# Patient Record
Sex: FEMALE | Race: BLACK OR AFRICAN AMERICAN | NOT HISPANIC OR LATINO | Employment: OTHER | URBAN - METROPOLITAN AREA
[De-identification: names, ages, dates, MRNs, and addresses within clinical notes are randomized per-mention and may not be internally consistent; named-entity substitution may affect disease eponyms.]

---

## 2017-01-03 ENCOUNTER — HOSPITAL ENCOUNTER (OUTPATIENT)
Dept: RADIOLOGY | Facility: CLINIC | Age: 67
Discharge: HOME | End: 2017-01-03
Attending: INTERNAL MEDICINE
Payer: MEDICARE

## 2017-01-03 DIAGNOSIS — E21.3 HYPERPARATHYROIDISM: ICD-10-CM

## 2017-01-03 PROCEDURE — 77080 DXA BONE DENSITY AXIAL: CPT | Mod: TC

## 2017-01-03 PROCEDURE — 77080 DXA BONE DENSITY AXIAL: CPT | Mod: 26,,, | Performed by: INTERNAL MEDICINE

## 2017-01-04 ENCOUNTER — OFFICE VISIT (OUTPATIENT)
Dept: ENDOCRINOLOGY | Facility: CLINIC | Age: 67
End: 2017-01-04
Payer: MEDICARE

## 2017-01-04 VITALS
WEIGHT: 230.38 LBS | HEIGHT: 67 IN | BODY MASS INDEX: 36.16 KG/M2 | HEART RATE: 79 BPM | SYSTOLIC BLOOD PRESSURE: 120 MMHG | DIASTOLIC BLOOD PRESSURE: 74 MMHG | RESPIRATION RATE: 16 BRPM

## 2017-01-04 DIAGNOSIS — E78.5 HYPERLIPIDEMIA, UNSPECIFIED HYPERLIPIDEMIA TYPE: ICD-10-CM

## 2017-01-04 DIAGNOSIS — E21.3 HYPERPARATHYROIDISM: ICD-10-CM

## 2017-01-04 DIAGNOSIS — E55.9 VITAMIN D DEFICIENCY: ICD-10-CM

## 2017-01-04 DIAGNOSIS — E66.9 OBESITY (BMI 30-39.9): ICD-10-CM

## 2017-01-04 PROCEDURE — 1159F MED LIST DOCD IN RCRD: CPT | Mod: GC,S$GLB,, | Performed by: INTERNAL MEDICINE

## 2017-01-04 PROCEDURE — 1160F RVW MEDS BY RX/DR IN RCRD: CPT | Mod: GC,S$GLB,, | Performed by: INTERNAL MEDICINE

## 2017-01-04 PROCEDURE — 4010F ACE/ARB THERAPY RXD/TAKEN: CPT | Mod: GC,S$GLB,, | Performed by: INTERNAL MEDICINE

## 2017-01-04 PROCEDURE — 3078F DIAST BP <80 MM HG: CPT | Mod: GC,S$GLB,, | Performed by: INTERNAL MEDICINE

## 2017-01-04 PROCEDURE — 3044F HG A1C LEVEL LT 7.0%: CPT | Mod: GC,S$GLB,, | Performed by: INTERNAL MEDICINE

## 2017-01-04 PROCEDURE — 3074F SYST BP LT 130 MM HG: CPT | Mod: GC,S$GLB,, | Performed by: INTERNAL MEDICINE

## 2017-01-04 PROCEDURE — 1157F ADVNC CARE PLAN IN RCRD: CPT | Mod: GC,S$GLB,, | Performed by: INTERNAL MEDICINE

## 2017-01-04 PROCEDURE — 1126F AMNT PAIN NOTED NONE PRSNT: CPT | Mod: GC,S$GLB,, | Performed by: INTERNAL MEDICINE

## 2017-01-04 PROCEDURE — 99214 OFFICE O/P EST MOD 30 MIN: CPT | Mod: GC,S$GLB,, | Performed by: INTERNAL MEDICINE

## 2017-01-04 PROCEDURE — 99999 PR PBB SHADOW E&M-EST. PATIENT-LVL III: CPT | Mod: PBBFAC,GC,, | Performed by: INTERNAL MEDICINE

## 2017-01-04 RX ORDER — METFORMIN HYDROCHLORIDE 500 MG/1
500 TABLET, EXTENDED RELEASE ORAL
Qty: 30 TABLET | Refills: 11 | Status: SHIPPED | OUTPATIENT
Start: 2017-01-04 | End: 2017-06-16 | Stop reason: SDUPTHER

## 2017-01-04 NOTE — MR AVS SNAPSHOT
[facility] - Endo/Diab/Metab  [address]  [place] [address]  Phone: [phone]  Fax: [phone]                  Maral Linn   2017 11:00 AM   Office Visit    Description:  Female : 1950   Provider:  Velasquez Lopez MD   Department:  [facility] - Endo/Diab/Metab           Reason for Visit     Hyperparathyroidism           Diagnoses this Visit        Comments    Type II or unspecified type diabetes mellitus without mention of complication, uncontrolled    -  Primary     Obesity (BMI 30-39.9)         Hyperlipidemia, unspecified hyperlipidemia type         Vitamin D deficiency         Hyperparathyroidism                To Do List           Future Appointments        Provider Department Dept Phone    2017 2:10 PM LAB, APPOINTMENT [place] [facility] [phone]    2017 10:45 AM Rosemary Pearson MD [facility] - Internal Medicine [phone]      Goals (5 Years of Data)     None      Follow-Up and Disposition     Return in about 1 year (around 2018).       These Medications        Disp Refills Start End    metformin (GLUCOPHAGE-XR) 500 MG 24 hr tablet 30 tablet 11 2017 2/3/2017    Take 1 tablet (500 mg total) by mouth daily with breakfast. - Oral    Pharmacy: [facility] [address] [place] [address] [facility] [address] Ph #: [phone]         [facility]     [facility] -  Assistance  Registered nurses in the [facility] provide clinical advisement, health education, appointment booking, and other advisory services.  Call for this free service at [phone].             Medications           Message regarding Medications     Verify the changes and/or additions to your medication regime listed below are the same as discussed with your clinician today.  If any of these changes or additions are incorrect, please notify your healthcare provider.        START taking  "these NEW medications        Refills    metformin (GLUCOPHAGE-XR) 500 MG 24 hr tablet 11    Sig: Take 1 tablet (500 mg total) by mouth daily with breakfast.    Class: Normal    Route: Oral      STOP taking these medications     metformin (GLUCOPHAGE) 500 MG tablet Take 1 tablet (500 mg total) by mouth 2 (two) times daily with meals.           Verify that the below list of medications is an accurate representation of the medications you are currently taking.  If none reported, the list may be blank. If incorrect, please contact your healthcare provider. Carry this list with you in case of emergency.           Current Medications     atenolol (TENORMIN) 25 MG tablet Take 1 tablet (25 mg total) by mouth once daily.    blood sugar diagnostic Strp 1 each by Misc.(Non-Drug; Combo Route) route once daily.    blood-glucose meter kit Use as instructed    doxazosin (CARDURA) 8 MG Tab TAKE 1 TABLET(8 MG) BY MOUTH EVERY EVENING    felodipine (PLENDIL) 10 MG 24 hr tablet TAKE 1 TABLET(10 MG) BY MOUTH EVERY DAY    fluticasone (FLONASE) 50 mcg/actuation nasal spray 2 Spray, Suspension Nasal Every day    lancets 33 gauge Misc 1 lancet by Misc.(Non-Drug; Combo Route) route once daily.    losartan (COZAAR) 100 MG tablet Take 1 tablet (100 mg total) by mouth once daily.    metformin (GLUCOPHAGE-XR) 500 MG 24 hr tablet Take 1 tablet (500 mg total) by mouth daily with breakfast.           Clinical Reference Information           Vital Signs - Last Recorded  Most recent update: 1/4/2017  1:11 PM by Carli Polo MA    BP Pulse Resp Ht Wt BMI    120/74 (BP Location: Left arm, Patient Position: Sitting, BP Method: Manual) 79 16 5' 7" (1.702 m) 104.5 kg (230 lb 6.1 oz) 36.08 kg/m2      Blood Pressure          Most Recent Value    BP  120/74      Allergies as of 1/4/2017     No Known Allergies      Immunizations Administered on Date of Encounter - 1/4/2017     None      Orders Placed During Today's Visit     Future Labs/Procedures " Expected by Expires    Comprehensive metabolic panel  1/4/2017 4/4/2017    PTH, intact  1/4/2017 4/4/2017    Vitamin D  1/4/2017 4/4/2017

## 2017-01-04 NOTE — PROGRESS NOTES
"Subjective:       Patient ID: Maral Linn is a 66 y.o. female.    Chief Complaint: Hyperparathyroidism    HPI     Patient is here for follow up for hypercalcemia     Initially seen by Endocrinology in 2013 for Ca level of 10.6  PTH of 569  2011 Sestamibi scan "the most likely location of a parathyroid adenoma would be the mid posterior level of the   left thyroid lobe."  2014 Head and neck U/S "Left lobe lesion could be a parathyroid adenoma"  2014 Dexa scan: Lumbar bone mineral density t-score of 1.2, Femoral neck BMD t-score is -0.5.     No history of nephrolithiasis  -Ankle fracture 2006        Also had concurrent Vitamin D deficiency of 15  -Prescribed Ergocalciferol and takes 1000 IU daily            New Diagnosis of Type 2 DM 9/2016  +Fm Hx sister, brother  -BMI: 37   -Not on therapy  -Checks daily at home randomly  -AM blood glucose values 120-178  +polyuria, +polydispia    Urine Ca/Cr ratio:  0.013    Results for MARAL LINN (MRN [id number]) as of 1/9/2017 00:18   Ref. Range 10/14/2016 08:53   Calcium, Urine Latest Ref Range: 0.0 - 15.0 mg/dL 14.5   Calcium, 24H Urine Latest Ref Range: 4 - 12 mg/Hr 10   CA Urine (mg/Spec) Latest Units: mg/Spec 236   Creatinine, Timed Urine Latest Ref Range: 40.0 - 75.0 mg/Hr 56.4   Creatinine, Ur (mg/spec) Latest Units: mg/Spec 1352.9         Review of Systems   Constitutional: Negative for unexpected weight change.   Eyes: Negative for visual disturbance.   Respiratory: Negative for shortness of breath.    Cardiovascular: Negative for chest pain.   Gastrointestinal: Negative for abdominal pain.   Musculoskeletal: Negative for arthralgias.   Skin: Negative for wound.   Neurological: Negative for headaches.   Hematological: Does not bruise/bleed easily.   Psychiatric/Behavioral: Negative for sleep disturbance.       Objective:      Physical Exam   Neck: No thyromegaly present.   Cardiovascular: Normal rate.    Pulmonary/Chest: Effort normal.   Abdominal: Soft. "   Musculoskeletal: She exhibits no edema.        Right foot: There is no deformity.        Left foot: There is no deformity.   Feet:   Right Foot:   Skin Integrity: Negative for ulcer.   Left Foot:   Skin Integrity: Negative for ulcer.   Skin:         Feet   Shoes appropriate  sensation intact to vibration and monofilament     Vitals reviewed.      Assessment:       Maral was seen today for hyperparathyroidism.    Diagnoses and all orders for this visit:    Type II or unspecified type diabetes mellitus without mention of complication, uncontrolled    Obesity (BMI 30-39.9)    Hyperlipidemia, unspecified hyperlipidemia type    Vitamin D deficiency    Hyperparathyroidism  -     Vitamin D; Future  -     Comprehensive metabolic panel; Future  -     PTH, intact; Future    Other orders  -     metformin (GLUCOPHAGE-XR) 500 MG 24 hr tablet; Take 1 tablet (500 mg total) by mouth daily with breakfast.      Plan:       Hyperparathyroidism  -Primary hyperparathyroidism with markedly elevated PTH with elevated Ca and urine calcium/cr ratio of 0.013  -Has concurrent Vitamin D deficiency which maybe contributing to higher value of PTH   -But prior imaging localizes what appears to be parathyroid adenoma left lobe  -Patient currently does not meet criteria for surgical resection for asymptomatic primary hyperparathyroidism  -Will follow patient with serial CMP to monitor for hypercalcemia     Thyroid nodule  -sub cm thyroid nodule not meeting criteria for bx  -multiple small cyst  -monitor clinically for growth and symptoms and if change in characteristics will repeat U/S     Type 2 DM  -a1c 6.8 indicates good control of diabetes  -Change to glucophage xr 500 mg daily for patient tolerance   -needs wt loss of 5%-8%        Vitamin D deficiency  -repeat Vit D level  -may require additional seven weeks of ergocalciferol  -Cont. Taking 2000 IU of otc      A. Ghazala Lopez MD  PGY 5  Endocrinology

## 2017-01-05 ENCOUNTER — TELEPHONE (OUTPATIENT)
Dept: ENDOCRINOLOGY | Facility: CLINIC | Age: 67
End: 2017-01-05

## 2017-01-05 DIAGNOSIS — E55.9 VITAMIN D DEFICIENCY: Primary | ICD-10-CM

## 2017-01-05 RX ORDER — ERGOCALCIFEROL 1.25 MG/1
50000 CAPSULE ORAL
Qty: 7 CAPSULE | Refills: 0 | Status: SHIPPED | OUTPATIENT
Start: 2017-01-05 | End: 2017-02-04

## 2017-01-05 NOTE — TELEPHONE ENCOUNTER
Vitamin D insufficiency.  Will order Ergocalciferol 50k weekly.    NEERAJ Lopez MD  PGY5  Endocrinology

## 2017-01-09 NOTE — PROGRESS NOTES
I  Nemesio March have personally taken the history and examined this patient and agree with the fellow's note.

## 2017-03-10 DIAGNOSIS — I10 ESSENTIAL HYPERTENSION: ICD-10-CM

## 2017-03-10 RX ORDER — ATENOLOL 25 MG/1
TABLET ORAL
Qty: 30 TABLET | Refills: 0 | Status: SHIPPED | OUTPATIENT
Start: 2017-03-10 | End: 2017-03-10 | Stop reason: SDUPTHER

## 2017-03-10 RX ORDER — LOSARTAN POTASSIUM 100 MG/1
TABLET ORAL
Qty: 30 TABLET | Refills: 0 | Status: SHIPPED | OUTPATIENT
Start: 2017-03-10 | End: 2017-03-10 | Stop reason: SDUPTHER

## 2017-03-11 ENCOUNTER — NURSE TRIAGE (OUTPATIENT)
Dept: ADMINISTRATIVE | Facility: CLINIC | Age: 67
End: 2017-03-11

## 2017-03-11 NOTE — TELEPHONE ENCOUNTER
Reason for Disposition   Message left on unidentified answering machine.  Answering service notified.    Protocols used: ST NO CONTACT OR DUPLICATE CONTACT CALL-A-AH

## 2017-03-13 RX ORDER — ATENOLOL 25 MG/1
TABLET ORAL
Qty: 90 TABLET | Refills: 0 | Status: SHIPPED | OUTPATIENT
Start: 2017-03-13 | End: 2017-06-16 | Stop reason: SDUPTHER

## 2017-03-13 RX ORDER — LOSARTAN POTASSIUM 100 MG/1
TABLET ORAL
Qty: 90 TABLET | Refills: 0 | Status: SHIPPED | OUTPATIENT
Start: 2017-03-13 | End: 2017-06-16 | Stop reason: SDUPTHER

## 2017-04-06 ENCOUNTER — TELEPHONE (OUTPATIENT)
Dept: INTERNAL MEDICINE | Facility: CLINIC | Age: 67
End: 2017-04-06

## 2017-05-01 DIAGNOSIS — E11.9 TYPE 2 DIABETES MELLITUS WITHOUT COMPLICATION: ICD-10-CM

## 2017-06-15 RX ORDER — FELODIPINE 10 MG/1
TABLET, EXTENDED RELEASE ORAL
Qty: 30 TABLET | Refills: 0 | Status: SHIPPED | OUTPATIENT
Start: 2017-06-15 | End: 2017-06-15 | Stop reason: SDUPTHER

## 2017-06-15 RX ORDER — FELODIPINE 10 MG/1
TABLET, EXTENDED RELEASE ORAL
Qty: 90 TABLET | Refills: 0 | Status: SHIPPED | OUTPATIENT
Start: 2017-06-15 | End: 2017-06-16 | Stop reason: SDUPTHER

## 2017-06-16 ENCOUNTER — OFFICE VISIT (OUTPATIENT)
Dept: INTERNAL MEDICINE | Facility: CLINIC | Age: 67
End: 2017-06-16
Payer: MEDICARE

## 2017-06-16 VITALS
WEIGHT: 233 LBS | TEMPERATURE: 99 F | DIASTOLIC BLOOD PRESSURE: 80 MMHG | OXYGEN SATURATION: 95 % | HEIGHT: 67 IN | SYSTOLIC BLOOD PRESSURE: 128 MMHG | BODY MASS INDEX: 36.57 KG/M2 | HEART RATE: 94 BPM

## 2017-06-16 DIAGNOSIS — E11.9 TYPE 2 DIABETES MELLITUS WITHOUT COMPLICATION, WITHOUT LONG-TERM CURRENT USE OF INSULIN: ICD-10-CM

## 2017-06-16 DIAGNOSIS — E21.3 HYPERPARATHYROIDISM: ICD-10-CM

## 2017-06-16 DIAGNOSIS — Z12.31 ENCOUNTER FOR SCREENING MAMMOGRAM FOR BREAST CANCER: Primary | ICD-10-CM

## 2017-06-16 DIAGNOSIS — I10 ESSENTIAL HYPERTENSION: ICD-10-CM

## 2017-06-16 PROCEDURE — 4010F ACE/ARB THERAPY RXD/TAKEN: CPT | Mod: S$GLB,,, | Performed by: INTERNAL MEDICINE

## 2017-06-16 PROCEDURE — 99499 UNLISTED E&M SERVICE: CPT | Mod: S$GLB,,, | Performed by: INTERNAL MEDICINE

## 2017-06-16 PROCEDURE — 3044F HG A1C LEVEL LT 7.0%: CPT | Mod: S$GLB,,, | Performed by: INTERNAL MEDICINE

## 2017-06-16 PROCEDURE — 1126F AMNT PAIN NOTED NONE PRSNT: CPT | Mod: S$GLB,,, | Performed by: INTERNAL MEDICINE

## 2017-06-16 PROCEDURE — 99999 PR PBB SHADOW E&M-EST. PATIENT-LVL IV: CPT | Mod: PBBFAC,,, | Performed by: INTERNAL MEDICINE

## 2017-06-16 PROCEDURE — 99214 OFFICE O/P EST MOD 30 MIN: CPT | Mod: S$GLB,,, | Performed by: INTERNAL MEDICINE

## 2017-06-16 PROCEDURE — 1159F MED LIST DOCD IN RCRD: CPT | Mod: S$GLB,,, | Performed by: INTERNAL MEDICINE

## 2017-06-16 RX ORDER — METFORMIN HYDROCHLORIDE 500 MG/1
500 TABLET, EXTENDED RELEASE ORAL
Qty: 30 TABLET | Refills: 3 | Status: SHIPPED | OUTPATIENT
Start: 2017-06-16 | End: 2017-07-16

## 2017-06-16 RX ORDER — LOSARTAN POTASSIUM 100 MG/1
TABLET ORAL
Qty: 90 TABLET | Refills: 1 | Status: SHIPPED | OUTPATIENT
Start: 2017-06-16 | End: 2018-01-22 | Stop reason: SDUPTHER

## 2017-06-16 RX ORDER — DOXAZOSIN 8 MG/1
TABLET ORAL
Qty: 30 TABLET | Refills: 3 | Status: SHIPPED | OUTPATIENT
Start: 2017-06-16 | End: 2018-03-21 | Stop reason: SDUPTHER

## 2017-06-16 RX ORDER — FELODIPINE 10 MG/1
10 TABLET, EXTENDED RELEASE ORAL DAILY
Qty: 90 TABLET | Refills: 1 | Status: SHIPPED | OUTPATIENT
Start: 2017-06-16 | End: 2018-03-21 | Stop reason: SDUPTHER

## 2017-06-16 RX ORDER — ATENOLOL 25 MG/1
TABLET ORAL
Qty: 90 TABLET | Refills: 0 | Status: SHIPPED | OUTPATIENT
Start: 2017-06-16 | End: 2017-10-06 | Stop reason: SDUPTHER

## 2017-06-20 NOTE — PROGRESS NOTES
Subjective:       Patient ID: Maral Linn is a 67 y.o. female.    Chief Complaint: Hypertension    HPI: She returns for management of hypertension.  She has had hypertension for over a year.  Current treatment has included medications outlined in medication list.  She denies chest pain or shortness of breath.  No palpitations.  Denies left arm or neck pain.  She has diabetes.  Denies polyuria, polydipsia    Medications: See med card    Social history: Does not smoke, does not drink alcohol      Review of Systems   Constitutional: Negative for chills, fatigue, fever and unexpected weight change.   Respiratory: Negative for chest tightness and shortness of breath.    Cardiovascular: Negative for chest pain and palpitations.   Gastrointestinal: Negative for abdominal pain and blood in stool.   Neurological: Negative for dizziness, syncope, numbness and headaches.       Objective:      Physical Exam   HENT:   Right Ear: External ear normal.   Left Ear: External ear normal.   Nose: Nose normal.   Mouth/Throat: Oropharynx is clear and moist.   Eyes: Pupils are equal, round, and reactive to light.   Neck: Normal range of motion.   Cardiovascular: Normal rate and regular rhythm.    No murmur heard.  Pulmonary/Chest: Breath sounds normal.   Abdominal: She exhibits no distension. There is no hepatosplenomegaly. There is no tenderness.   Lymphadenopathy:     She has no cervical adenopathy.     She has no axillary adenopathy.   Neurological: She has normal strength and normal reflexes. No cranial nerve deficit or sensory deficit.     breast exam: No mass palpated, no nipple discharge expressed  Assessment:     assessment and plan: 1.  Hypertension: Check CMP and lipid panel  2.  Diabetes: Check glycosylated hemoglobin and urine microalbumin  3.  Schedule mammogram.  Check B12 and PTH      Plan:     as above

## 2017-06-23 ENCOUNTER — HOSPITAL ENCOUNTER (OUTPATIENT)
Dept: RADIOLOGY | Facility: HOSPITAL | Age: 67
Discharge: HOME | End: 2017-06-23
Attending: INTERNAL MEDICINE
Payer: MEDICARE

## 2017-06-23 VITALS — HEIGHT: 67 IN | WEIGHT: 233 LBS | BODY MASS INDEX: 36.57 KG/M2

## 2017-06-23 DIAGNOSIS — Z12.31 ENCOUNTER FOR SCREENING MAMMOGRAM FOR BREAST CANCER: ICD-10-CM

## 2017-06-23 PROCEDURE — 77067 SCR MAMMO BI INCL CAD: CPT | Mod: TC

## 2017-06-23 PROCEDURE — 77067 SCR MAMMO BI INCL CAD: CPT | Mod: 26,,, | Performed by: RADIOLOGY

## 2017-07-01 ENCOUNTER — TELEPHONE (OUTPATIENT)
Dept: INTERNAL MEDICINE | Facility: CLINIC | Age: 67
End: 2017-07-01

## 2017-07-01 NOTE — TELEPHONE ENCOUNTER
Have attempted to contact patient by phone numerous times about her lab result- no answer.Will mail letter asking her to contact our ofice

## 2017-08-12 ENCOUNTER — OFFICE VISIT (OUTPATIENT)
Dept: URGENT CARE | Facility: CLINIC | Age: 67
End: 2017-08-12
Payer: MEDICARE

## 2017-08-12 VITALS
OXYGEN SATURATION: 98 % | WEIGHT: 237.19 LBS | HEIGHT: 67 IN | HEART RATE: 85 BPM | SYSTOLIC BLOOD PRESSURE: 166 MMHG | BODY MASS INDEX: 37.23 KG/M2 | TEMPERATURE: 98 F | DIASTOLIC BLOOD PRESSURE: 98 MMHG

## 2017-08-12 DIAGNOSIS — S65.212A LACERATION OF SUPERFICIAL PALMAR ARCH OF LEFT HAND, INITIAL ENCOUNTER: Primary | ICD-10-CM

## 2017-08-12 DIAGNOSIS — T36.95XA ANTIBIOTIC-INDUCED YEAST INFECTION: ICD-10-CM

## 2017-08-12 DIAGNOSIS — B37.9 ANTIBIOTIC-INDUCED YEAST INFECTION: ICD-10-CM

## 2017-08-12 PROCEDURE — 90471 IMMUNIZATION ADMIN: CPT | Mod: S$GLB,,, | Performed by: NURSE PRACTITIONER

## 2017-08-12 PROCEDURE — 3080F DIAST BP >= 90 MM HG: CPT | Mod: S$GLB,,, | Performed by: NURSE PRACTITIONER

## 2017-08-12 PROCEDURE — 90715 TDAP VACCINE 7 YRS/> IM: CPT | Mod: S$GLB,,, | Performed by: NURSE PRACTITIONER

## 2017-08-12 PROCEDURE — 1125F AMNT PAIN NOTED PAIN PRSNT: CPT | Mod: S$GLB,,, | Performed by: NURSE PRACTITIONER

## 2017-08-12 PROCEDURE — 99214 OFFICE O/P EST MOD 30 MIN: CPT | Mod: 25,S$GLB,, | Performed by: NURSE PRACTITIONER

## 2017-08-12 PROCEDURE — 1159F MED LIST DOCD IN RCRD: CPT | Mod: S$GLB,,, | Performed by: NURSE PRACTITIONER

## 2017-08-12 PROCEDURE — 3008F BODY MASS INDEX DOCD: CPT | Mod: S$GLB,,, | Performed by: NURSE PRACTITIONER

## 2017-08-12 PROCEDURE — 3077F SYST BP >= 140 MM HG: CPT | Mod: S$GLB,,, | Performed by: NURSE PRACTITIONER

## 2017-08-12 PROCEDURE — 99999 PR PBB SHADOW E&M-EST. PATIENT-LVL IV: CPT | Mod: PBBFAC,,, | Performed by: NURSE PRACTITIONER

## 2017-08-12 RX ORDER — FLUCONAZOLE 150 MG/1
150 TABLET ORAL ONCE
Qty: 2 TABLET | Refills: 0 | Status: SHIPPED | OUTPATIENT
Start: 2017-08-12 | End: 2017-08-12

## 2017-08-12 RX ORDER — CEPHALEXIN 500 MG/1
500 CAPSULE ORAL EVERY 12 HOURS
Qty: 14 CAPSULE | Refills: 0 | Status: SHIPPED | OUTPATIENT
Start: 2017-08-12 | End: 2017-08-17

## 2017-08-12 RX ORDER — MUPIROCIN 20 MG/G
OINTMENT TOPICAL 3 TIMES DAILY
Qty: 1 TUBE | Refills: 0 | Status: SHIPPED | OUTPATIENT
Start: 2017-08-12 | End: 2017-08-17

## 2017-08-12 NOTE — PROGRESS NOTES
Administered Tdap  IM to pt left deltoid. Pt tolerated well. No distress noted. Advised pt to wait 20 minutes in lobby and to inform  if any adverse reaction occurs. Pt stated understanding.

## 2017-08-12 NOTE — PROGRESS NOTES
"Subjective:       Patient ID: Maral Linn is a 67 y.o. female.    Chief Complaint: Laceration    HPI  Ms. Linn presents after a cut to her left hand last night. She cut the hand with a kitchen knife. She is concerned because she is diabetic and when she woke up this AM she noticed that it was swollen.   BP (!) 166/98 (BP Location: Right arm, Patient Position: Sitting, BP Method: Medium (Automatic))   Pulse 85   Temp 97.5 °F (36.4 °C) (Tympanic)   Ht 5' 7" (1.702 m)   Wt 107.6 kg (237 lb 3.4 oz)   SpO2 98%   BMI 37.15 kg/m²     Review of Systems   Constitutional: Negative for activity change, appetite change, chills, diaphoresis and fever.   HENT: Negative for sore throat.    Respiratory: Negative for cough, chest tightness and shortness of breath.    Cardiovascular: Negative for chest pain.   Gastrointestinal: Negative for abdominal distention, abdominal pain, diarrhea, nausea and vomiting.   Musculoskeletal: Negative for myalgias.   Skin: Positive for wound. Negative for rash.   Allergic/Immunologic: Negative for immunocompromised state.   Hematological: Does not bruise/bleed easily.   Psychiatric/Behavioral: Negative for behavioral problems and confusion.       Objective:      Physical Exam   Constitutional: She is oriented to person, place, and time. She appears well-developed and well-nourished. No distress.   Eyes: Conjunctivae and EOM are normal. Pupils are equal, round, and reactive to light.   Cardiovascular: Normal rate.    Pulmonary/Chest: Effort normal. No respiratory distress.   Musculoskeletal: Normal range of motion. She exhibits no edema or deformity.   Lymphadenopathy:     She has no cervical adenopathy.   Neurological: She is alert and oriented to person, place, and time.   Skin: Skin is warm and dry. No rash noted. She is not diaphoretic.   There is a 1 cm laceration to the lateral aspect of the left palm near the fifth finger. The area is swollen. There is no drainage. There is mild " erythema surrounding the wound. The area is well approximated and appears to be healing   Psychiatric: She has a normal mood and affect. Her behavior is normal.   Vitals reviewed.      Assessment:       1. Laceration of superficial palmar arch of left hand, initial encounter    2. Antibiotic-induced yeast infection        Plan:       Maral was seen today for laceration.    Diagnoses and all orders for this visit:    Laceration of superficial palmar arch of left hand, initial encounter  -     Tdap Vaccine  -     cephALEXin (KEFLEX) 500 MG capsule; Take 1 capsule (500 mg total) by mouth every 12 (twelve) hours.  -     mupirocin (BACTROBAN) 2 % ointment; Apply topically 3 (three) times daily.    Antibiotic-induced yeast infection  -     fluconazole (DIFLUCAN) 150 MG Tab; Take 1 tablet (150 mg total) by mouth once. Repeat in 3 days if symptoms still persist.    If symptoms worsen or fail to improve, follow-up with primary care doctor or nearest ER. After visit summary given and discussed. Patient verbalized understanding and agrees with treatment plan. Patient remained stable and was discharged in no acute distress.

## 2017-10-06 RX ORDER — ATENOLOL 25 MG/1
TABLET ORAL
Qty: 90 TABLET | Refills: 0 | Status: SHIPPED | OUTPATIENT
Start: 2017-10-06 | End: 2018-03-21 | Stop reason: SDUPTHER

## 2018-01-05 ENCOUNTER — OFFICE VISIT (OUTPATIENT)
Dept: ENDOCRINOLOGY | Facility: CLINIC | Age: 68
End: 2018-01-05
Payer: MEDICARE

## 2018-01-05 VITALS
HEIGHT: 67 IN | DIASTOLIC BLOOD PRESSURE: 80 MMHG | HEART RATE: 90 BPM | WEIGHT: 245.56 LBS | SYSTOLIC BLOOD PRESSURE: 128 MMHG | BODY MASS INDEX: 38.54 KG/M2

## 2018-01-05 DIAGNOSIS — E21.3 HYPERPARATHYROIDISM: Primary | ICD-10-CM

## 2018-01-05 DIAGNOSIS — E55.9 VITAMIN D DEFICIENCY: ICD-10-CM

## 2018-01-05 DIAGNOSIS — E11.9 CONTROLLED TYPE 2 DIABETES MELLITUS WITHOUT COMPLICATION, WITHOUT LONG-TERM CURRENT USE OF INSULIN: ICD-10-CM

## 2018-01-05 DIAGNOSIS — E04.1 THYROID NODULE: ICD-10-CM

## 2018-01-05 LAB
CREAT UR-MCNC: 73 MG/DL
MICROALBUMIN UR DL<=1MG/L-MCNC: 9 UG/ML
MICROALBUMIN/CREATININE RATIO: 12.3 UG/MG

## 2018-01-05 PROCEDURE — 82043 UR ALBUMIN QUANTITATIVE: CPT

## 2018-01-05 PROCEDURE — 99214 OFFICE O/P EST MOD 30 MIN: CPT | Mod: S$GLB,,, | Performed by: INTERNAL MEDICINE

## 2018-01-05 PROCEDURE — 99499 UNLISTED E&M SERVICE: CPT | Mod: S$GLB,,, | Performed by: INTERNAL MEDICINE

## 2018-01-05 PROCEDURE — 99999 PR PBB SHADOW E&M-EST. PATIENT-LVL III: CPT | Mod: PBBFAC,,, | Performed by: INTERNAL MEDICINE

## 2018-01-05 NOTE — PROGRESS NOTES
"Subjective:      Patient ID: Maral Linn is a 67 y.o. female.    Chief Complaint:  Diabetes and Hyperparathyroidism      History of Present Illness  Patient is here for follow up primary HPT and diabetes. Last seen by Andrea Salvador in 1/2017.     Initially seen by Endocrinology in 2013 for Ca level of 10.6  PTH as high as 569  2011 Sestamibi scan "the most likely location of a parathyroid adenoma would be the mid posterior level of the   left thyroid lobe."  2014 Head and neck U/S "Left lobe lesion could be a parathyroid adenoma"  2016 sestimibi and neck U/S - no parathyroid adenoma identified  Last bone density 1/2017 was normal.      No history of nephrolithiasis  -Ankle fracture 2006, no history of fragility fracture.    No hx of lithium. Not on thiazide diuretic.     Has sister that had hyperparathyroidism and had parathyroidectomy. Sister has CKD, so unsure if sister had primary or tertiary HPT.     No polyuria. Nocturia x 2. No polydipsia.    No CKD. Urine Ca/Cr ratio: 0.013     Also had concurrent Vitamin D deficiency of 15  -Prescribed Ergocalciferol and takes 1000 IU daily      New Diagnosis of Type 2 DM 9/2016  +Fm Hx sister, brother  -BMI: 38  -Not on therapy  Taking metformin 500 mg PO BID  AM: 100-130     Review of Systems   Constitutional: Negative for chills and fever.   Gastrointestinal: Negative for nausea.   No CP   No SOB    Objective:   Physical Exam   Nursing note and vitals reviewed.  No thyromegaly  DTR's 2 +  No tremor  No edema  Feet no cuts or  scratches  Shoes appropriate  sensation intact to vibration and monofilament    Lab Review:   Results for MARAL LINN (MRN [id number]) as of 1/5/2018 07:38   Ref. Range 6/23/2017 08:55   Sodium Latest Ref Range: 136 - 145 mmol/L 139   Potassium Latest Ref Range: 3.5 - 5.1 mmol/L 3.6   Chloride Latest Ref Range: 95 - 110 mmol/L 106   CO2 Latest Ref Range: 23 - 29 mmol/L 27   Anion Gap Latest Ref Range: 8 - 16 mmol/L 6 (L)   BUN, Bld " Latest Ref Range: 8 - 23 mg/dL 16   Creatinine Latest Ref Range: 0.5 - 1.4 mg/dL 1.0   eGFR if non African American Latest Ref Range: >60 mL/min/1.73 m^2 58.0 (A)   eGFR if African American Latest Ref Range: >60 mL/min/1.73 m^2 >60.0   Glucose Latest Ref Range: 70 - 110 mg/dL 120 (H)   Calcium Latest Ref Range: 8.7 - 10.5 mg/dL 11.0 (H)   Alkaline Phosphatase Latest Ref Range: 55 - 135 U/L 101   Total Protein Latest Ref Range: 6.0 - 8.4 g/dL 7.1   Albumin Latest Ref Range: 3.5 - 5.2 g/dL 3.5   Total Bilirubin Latest Ref Range: 0.1 - 1.0 mg/dL 0.4   AST Latest Ref Range: 10 - 40 U/L 15   ALT Latest Ref Range: 10 - 44 U/L 18   Triglycerides Latest Ref Range: 30 - 150 mg/dL 146   Cholesterol Latest Ref Range: 120 - 199 mg/dL 186   HDL Latest Ref Range: 40 - 75 mg/dL 37 (L)   LDL Cholesterol Latest Ref Range: 63.0 - 159.0 mg/dL 119.8   Total Cholesterol/HDL Ratio Latest Ref Range: 2.0 - 5.0  5.0   Vitamin B-12 Latest Ref Range: 210 - 950 pg/mL 918   Hemoglobin A1C Latest Ref Range: 4.0 - 5.6 % 6.6 (H)   Estimated Avg Glucose Latest Ref Range: 68 - 131 mg/dL 143 (H)   PTH Latest Ref Range: 9.0 - 77.0 pg/mL 374.0 (H)     Results for ALAN VARGAS (MRN [id number]) as of 1/5/2018 07:38   Ref. Range 10/14/2016 08:53   Calcium, Urine Latest Ref Range: 0.0 - 15.0 mg/dL 14.5   Calcium, 24H Urine Latest Ref Range: 4 - 12 mg/Hr 10   CA Urine (mg/Spec) Latest Units: mg/Spec 236   Creatinine, Ur (mg/spec) Latest Units: mg/Spec 1352.9       BONE MINERAL DENSITY RESULTS 1/3/2017:  Lumbar Spine: Lumbar bone mineral density L1-L4 is 1.359g/cm2, which is a t-score of 2.8. The z-score is 4.1.    Total Hip: The total hip bone mineral density is 1.031g/cm2.  The t-score is 0.7, and the z-score is 1.1.  Femoral neck BMD is 0.852g/cm2 and the t-score is 0.0.    Lt Forearm: Lt Forearm bone mineral density is 0.628g/cm2, which is a t-score of 0.9 and the z-score is 2.7.      COMPARISONS:  Date Location BMD T-score  06/05/14 L-spine 1.286  2.2  Total Hip 1.020 0.6    Assessment:     1. Hyperparathyroidism    2. Thyroid nodule    3. Controlled type 2 diabetes mellitus without complication, without long-term current use of insulin    4. Vitamin D deficiency        Plan:     Hyperparathyroidism  -Primary hyperparathyroidism with markedly elevated PTH with elevated Ca and urine calcium/cr ratio of 0.013  -Cautiously replacing Vit D given hypercalcemia  -Decision made previously to monitor as not obvious surgical indications (calcium has been near 11-11.5 mg/dL when corrected for albumin which would be a surgical indication)  -Previously localized in 2011, but recent imaging in 2016 did not localize  -Repeat PTH and calcium today  -Encouraged adequate hydration     Thyroid nodule  -sub cm thyroid nodule not meeting criteria for bx  -Will repeat ultrasound in 1 year     Type 2 DM  -A1c at goal  -Continue metformin 500 mg PO BID  -Due for eye exam and will make appt  -Urine micro today      Vitamin D deficiency  -Will check Vit D today and cautiously replace so as to not worsen hypercalcemia    Lon Reyes M.D. Staff Endocrinology

## 2018-01-05 NOTE — PROGRESS NOTES
Patient, Maral Linn (MRN #[id number]), presented with a recorded BMI of 38.47 kg/m^2 and a documented comorbidity(s):  - Diabetes Mellitus Type 2  to which the severe obesity is a contributing factor. This is consistent with the definition of severe obesity (BMI 35.0-35.9) with comorbidity (ICD-10 E66.01, Z68.35). The patient's severe obesity was monitored, evaluated, addressed and/or treated. This addendum to the medical record is made on 01/05/2018.

## 2018-01-08 ENCOUNTER — PATIENT MESSAGE (OUTPATIENT)
Dept: ENDOCRINOLOGY | Facility: CLINIC | Age: 68
End: 2018-01-08

## 2018-01-08 DIAGNOSIS — E21.3 HYPERPARATHYROIDISM: Primary | ICD-10-CM

## 2018-01-08 DIAGNOSIS — E83.39 HYPOPHOSPHATEMIA: ICD-10-CM

## 2018-01-08 RX ORDER — VIT C/E/ZN/COPPR/LUTEIN/ZEAXAN 250MG-90MG
1000 CAPSULE ORAL DAILY
Refills: 0 | COMMUNITY
Start: 2018-01-08

## 2018-01-19 ENCOUNTER — OFFICE VISIT (OUTPATIENT)
Dept: PODIATRY | Facility: CLINIC | Age: 68
End: 2018-01-19
Payer: MEDICARE

## 2018-01-19 VITALS
DIASTOLIC BLOOD PRESSURE: 97 MMHG | SYSTOLIC BLOOD PRESSURE: 189 MMHG | HEIGHT: 67 IN | BODY MASS INDEX: 36.88 KG/M2 | WEIGHT: 235 LBS | HEART RATE: 73 BPM | RESPIRATION RATE: 18 BRPM

## 2018-01-19 DIAGNOSIS — R20.2 NUMBNESS AND TINGLING OF FOOT: ICD-10-CM

## 2018-01-19 DIAGNOSIS — E11.9 COMPREHENSIVE DIABETIC FOOT EXAMINATION, TYPE 2 DM, ENCOUNTER FOR: Primary | ICD-10-CM

## 2018-01-19 DIAGNOSIS — R20.0 NUMBNESS AND TINGLING OF FOOT: ICD-10-CM

## 2018-01-19 PROCEDURE — 99999 PR PBB SHADOW E&M-EST. PATIENT-LVL III: CPT | Mod: PBBFAC,,, | Performed by: PODIATRIST

## 2018-01-19 PROCEDURE — 99213 OFFICE O/P EST LOW 20 MIN: CPT | Mod: S$GLB,,, | Performed by: PODIATRIST

## 2018-01-19 RX ORDER — PROMETHAZINE HYDROCHLORIDE AND DEXTROMETHORPHAN HYDROBROMIDE 6.25; 15 MG/5ML; MG/5ML
SYRUP ORAL
Refills: 0 | COMMUNITY
Start: 2018-01-12 | End: 2018-06-20 | Stop reason: ALTCHOICE

## 2018-01-19 RX ORDER — AZITHROMYCIN 250 MG/1
TABLET, FILM COATED ORAL
COMMUNITY
Start: 2018-01-12 | End: 2018-01-19 | Stop reason: ALTCHOICE

## 2018-01-19 RX ORDER — METFORMIN HYDROCHLORIDE 500 MG/1
TABLET, EXTENDED RELEASE ORAL
Refills: 2 | COMMUNITY
Start: 2017-10-17 | End: 2018-01-23 | Stop reason: SDUPTHER

## 2018-01-19 NOTE — PROGRESS NOTES
Subjective:      Patient ID: Maral Linn is a 67 y.o. female.    Chief Complaint: PCP (Rosemary Pearson MD 6/16/17 F/U 3/18); Diabetic Foot Exam; Foot Problem (numbness ); Nail Care; and Callouses    Maral is a 67 y.o. female who presents to the clinic upon referral from Dr. Hetal horta. provider found  for evaluation and treatment of diabetic feet. Maral has a past medical history of Cataract; Hyperlipidemia (11/20/2014); Hyperparathyroidism (11/20/2014); Hypertension; Hypertension (11/20/2014); Obesity (BMI 30-39.9) (11/20/2014); Type II or unspecified type diabetes mellitus without mention of complication, uncontrolled (11/20/2014); and Vitamin D deficiency (11/20/2014). Patient relates no major problem with feet. Only complaints today consist of yearly DM foot examination  .    PCP: Rosemary Pearson MD    Date Last Seen by PCP:   Chief Complaint   Patient presents with    PCP     Rosemary Pearson MD 6/16/17 F/U 3/18    Diabetic Foot Exam    Foot Problem     numbness     Nail Care    Callouses         Current shoe gear: Casual shoes    Hemoglobin A1C   Date Value Ref Range Status   01/05/2018 6.3 (H) 4.0 - 5.6 % Final     Comment:     According to ADA guidelines, hemoglobin A1c <7.0% represents  optimal control in non-pregnant diabetic patients. Different  metrics may apply to specific patient populations.   Standards of Medical Care in Diabetes-2016.  For the purpose of screening for the presence of diabetes:  <5.7%     Consistent with the absence of diabetes  5.7-6.4%  Consistent with increasing risk for diabetes   (prediabetes)  >or=6.5%  Consistent with diabetes  Currently, no consensus exists for use of hemoglobin A1c  for diagnosis of diabetes for children.  This Hemoglobin A1c assay has significant interference with fetal   hemoglobin   (HbF). The results are invalid for patients with abnormal amounts of   HbF,   including those with known Hereditary Persistence   of Fetal Hemoglobin. Heterozygous  hemoglobin variants (HbAS, HbAC,   HbAD, HbAE, HbA2) do not significantly interfere with this assay;   however, presence of multiple variants in a sample may impact the %   interference.     06/23/2017 6.6 (H) 4.0 - 5.6 % Final     Comment:     According to ADA guidelines, hemoglobin A1c <7.0% represents  optimal control in non-pregnant diabetic patients. Different  metrics may apply to specific patient populations.   Standards of Medical Care in Diabetes-2016.  For the purpose of screening for the presence of diabetes:  <5.7%     Consistent with the absence of diabetes  5.7-6.4%  Consistent with increasing risk for diabetes   (prediabetes)  >or=6.5%  Consistent with diabetes  Currently, no consensus exists for use of hemoglobin A1c  for diagnosis of diabetes for children.  This Hemoglobin A1c assay has significant interference with fetal   hemoglobin   (HbF). The results are invalid for patients with abnormal amounts of   HbF,   including those with known Hereditary Persistence   of Fetal Hemoglobin. Heterozygous hemoglobin variants (HbAS, HbAC,   HbAD, HbAE, HbA2) do not significantly interfere with this assay;   however, presence of multiple variants in a sample may impact the %   interference.     10/19/2016 6.8 (H) 4.5 - 6.2 % Final     Comment:     According to ADA guidelines, hemoglobin A1C <7.0% represents  optimal control in non-pregnant diabetic patients.  Different  metrics may apply to specific populations.   Standards of Medical Care in Diabetes - 2016.  For the purpose of screening for the presence of diabetes:  <5.7%     Consistent with the absence of diabetes  5.7-6.4%  Consistent with increasing risk for diabetes   (prediabetes)  >or=6.5%  Consistent with diabetes  Currently no consensus exists for use of hemoglobin A1C  for diagnosis of diabetes for children.             Review of Systems   Constitution: Negative for chills, decreased appetite and fever.   Cardiovascular: Negative for leg swelling.  "  Skin: Positive for dry skin.   Musculoskeletal: Negative for arthritis, joint pain, joint swelling and myalgias.   Gastrointestinal: Negative for nausea and vomiting.   Neurological: Negative for loss of balance, numbness and paresthesias.           Objective:       Vitals:    01/19/18 1059   BP: (!) 189/97   Pulse: 73   Resp: 18   Weight: 106.6 kg (235 lb)   Height: 5' 7" (1.702 m)   PainSc: 0-No pain        Physical Exam   Constitutional: She is oriented to person, place, and time. She appears well-developed and well-nourished.   Cardiovascular:   Pulses:       Dorsalis pedis pulses are 2+ on the right side, and 2+ on the left side.        Posterior tibial pulses are 2+ on the right side, and 2+ on the left side.   Musculoskeletal: She exhibits no edema or tenderness.        Right ankle: Normal.        Left ankle: Normal.        Right foot: There is no swelling, no crepitus and no deformity.        Left foot: There is no swelling, no crepitus and no deformity.   Adequate joint range of motion without pain, limitation, nor crepitation Bilateral feet and ankle joints. Muscle strength is 5/5 in all groups bilaterally.         Feet:   Right Foot:   Protective Sensation: 5 sites tested. 5 sites sensed.   Left Foot:   Protective Sensation: 5 sites tested. 5 sites sensed.   Lymphadenopathy:   No palpable lymph nodes   Neurological: She is alert and oriented to person, place, and time. She has normal strength.   Skin: Skin is warm, dry and intact. No rash noted. No erythema. Nails show no clubbing.   Nails 1-5 bilaterally  are normal in length, thickness, coloration and are non dystrophic.      Psychiatric: She has a normal mood and affect. Her behavior is normal.             Assessment:       Encounter Diagnoses   Name Primary?    Comprehensive diabetic foot examination, type 2 DM, encounter for Yes    Numbness and tingling of foot          Plan:       Maral was seen today for pcp, diabetic foot exam, foot problem, " nail care and callouses.    Diagnoses and all orders for this visit:    Comprehensive diabetic foot examination, type 2 DM, encounter for    Numbness and tingling of foot      I counseled the patient on her conditions, their implications and medical management.      - Shoe inspection. Diabetic Foot Education. Patient reminded of the importance of good nutrition and blood sugar control to help prevent podiatric complications of diabetes. Patient instructed on proper foot hygeine. We discussed wearing proper shoe gear, daily foot inspections, never walking without protective shoe gear, caution putting sharp instruments to feet     - Discussed DM foot care:  Wear comfortable, proper fitting shoes. Wash feet daily. Dry well. After drying, apply moisturizer to feet (no lotion to webspaces). Inspect feet daily for skin breaks, blisters, swelling, or redness. Wear cotton socks (preferably white)  Change socks every day. Do NOT walk barefoot. Do NOT use heating pads or warm/hot water soaks     - Discussed importance of daily moisturizer to the feet such as Gold bonds diabetic foot cream    - Patient is low risk for developing lower extremity issues secondary to diabetes    - Advised patient to monitor blood sugar levels. Steroid injections may cause temporary elevations in BS. Patient verbally reports understanding and will closely monitor BS and follow up as needed if any elevated or  uncontrollable BS should occur     - RTC in  1 year for a diabetic foot exam  or sooner if problems

## 2018-01-22 DIAGNOSIS — I10 ESSENTIAL HYPERTENSION: ICD-10-CM

## 2018-01-22 RX ORDER — LOSARTAN POTASSIUM 100 MG/1
TABLET ORAL
Qty: 90 TABLET | Refills: 0 | Status: SHIPPED | OUTPATIENT
Start: 2018-01-22 | End: 2018-03-21 | Stop reason: SDUPTHER

## 2018-01-23 ENCOUNTER — PATIENT MESSAGE (OUTPATIENT)
Dept: ENDOCRINOLOGY | Facility: CLINIC | Age: 68
End: 2018-01-23

## 2018-01-23 RX ORDER — METFORMIN HYDROCHLORIDE 500 MG/1
TABLET, EXTENDED RELEASE ORAL
Qty: 60 TABLET | Refills: 11 | Status: SHIPPED | OUTPATIENT
Start: 2018-01-23 | End: 2019-02-14 | Stop reason: SDUPTHER

## 2018-02-19 ENCOUNTER — OFFICE VISIT (OUTPATIENT)
Dept: OPTOMETRY | Facility: CLINIC | Age: 68
End: 2018-02-19
Payer: MEDICARE

## 2018-02-19 DIAGNOSIS — Z01.00 DIABETIC EYE EXAM: Primary | ICD-10-CM

## 2018-02-19 DIAGNOSIS — H52.12 MYOPIA OF LEFT EYE WITH ASTIGMATISM: ICD-10-CM

## 2018-02-19 DIAGNOSIS — H25.13 NUCLEAR SCLEROSIS OF BOTH EYES: ICD-10-CM

## 2018-02-19 DIAGNOSIS — H52.4 PRESBYOPIA OF BOTH EYES: ICD-10-CM

## 2018-02-19 DIAGNOSIS — H52.202 MYOPIA OF LEFT EYE WITH ASTIGMATISM: ICD-10-CM

## 2018-02-19 DIAGNOSIS — H26.9 CORTICAL CATARACT OF RIGHT EYE: ICD-10-CM

## 2018-02-19 DIAGNOSIS — E11.9 TYPE 2 DIABETES MELLITUS WITHOUT RETINOPATHY: ICD-10-CM

## 2018-02-19 DIAGNOSIS — H52.11 MYOPIA OF RIGHT EYE: ICD-10-CM

## 2018-02-19 DIAGNOSIS — E11.9 DIABETIC EYE EXAM: Primary | ICD-10-CM

## 2018-02-19 PROCEDURE — 92014 COMPRE OPH EXAM EST PT 1/>: CPT | Mod: S$GLB,,, | Performed by: OPTOMETRIST

## 2018-02-19 PROCEDURE — 92015 DETERMINE REFRACTIVE STATE: CPT | Mod: S$GLB,,, | Performed by: OPTOMETRIST

## 2018-02-19 PROCEDURE — 99999 PR PBB SHADOW E&M-EST. PATIENT-LVL II: CPT | Mod: PBBFAC,,, | Performed by: OPTOMETRIST

## 2018-02-19 PROCEDURE — 99499 UNLISTED E&M SERVICE: CPT | Mod: S$GLB,,, | Performed by: OPTOMETRIST

## 2018-02-19 NOTE — PROGRESS NOTES
HPI     Concerns About Ocular Health    Additional comments: Eye exam and refraction.  No longer wearing CLs.   Notes problems with night vision when it is raining.  Occasional eye pain/discomfort - sometimes OD and sometimes OS - never OU.              Comments     Patient's age: 68 y.o. AA female  Occupation: Retired  Approximate date of last eye examination:  11/18/2016  Name of last eye doctor seen: Dr. Valdes   Wears glasses? yes     If yes, wears  Full time  Present glasses are bifocal / S V Distance / S V Reading:  bifocals  Approximate age of present glasses:  1 yr   Got new glasses following last exam, or subsequently?:  yes   Any problem with VA with glasses?  no  Wears CLs?:  Use to wear them  Headaches? no  Eye pain/discomfort?  One eye or the other aches off and on -also, gets   shooting pains, off and on                                                                                        Flashes?  no  Floaters?  Off and on  Diplopia/Double vision?  no  Patient's Ocular History:         Any eye surgeries? no         Any eye injury?  no         Any treatment for eye disease?  H/o iritis  Family history of eye disease?  Late dad-glaucoma  Significant patient medical history:         1. Diabetes?  Yes       If yes, IDDM or NIDDM? NIDDM x 1 wk   2. HBP?  yes              3. Other (describe):  High cholesterol, arthritis   ! OTC eyedrops currently using:  Visine, prn-OU-mostly in AM   ! Prescription eye meds currently using:  no   ! Any history of allergy/adverse reaction to any eye meds used   previously?  no    ! Any history of allergy/adverse reaction to eyedrops used during prior   eye exam(s)? no    ! Any history of allergy/adverse reaction to Novacaine or similar meds?   no   ! Any history of allergy/adverse reaction to Epinephrine or similar meds?   no    ! Patient okay with use of anesthetic eyedrops to check eye pressure?    yes        ! Patient okay with use of eyedrops to dilate pupils today?  " Yes                                                     !  Allergies/Medications/Medical History/Family History reviewed today?    yes        PD =   68/64  Desired reading distance =  16"                                                                      Last edited by Kolton Valdes, OD on 2/19/2018  3:04 PM. (History)            Assessment /Plan     For exam results, see Encounter Report.    1. Diabetic eye exam     2. Type 2 diabetes mellitus without retinopathy     3. Nuclear sclerosis of both eyes     4. Cortical cataract of right eye     5. Myopia of right eye     6. Myopia of left eye with astigmatism     7. Presbyopia of both eyes                Early nuclear sclerosis of lens of both eyes, consistent with age.  Early peripheral cortical cataract in the right eye.'  No need for cataract surgery in either eye.  Otherwise, good ocular health in both eyes.  Myopia in the right eye, and myopia with astigmatism in the left eye.  Good best-corrected VA in each eye.  Presbyopia.  New spectacle lens Rx issued for use as desired.  Recommend full-time wear.  Ms. Linn no longer wearing CLs.    Recheck in one year - or prior if any problems noted in the interim.         "

## 2018-02-19 NOTE — PATIENT INSTRUCTIONS
Early nuclear sclerosis of lens of both eyes, consistent with age.  Early peripheral cortical cataract in the right eye.'  No need for cataract surgery in either eye.  Otherwise, good ocular health in both eyes.  Myopia in the right eye, and myopia with astigmatism in the left eye.  Good best-corrected VA in each eye.  Presbyopia.  New spectacle lens Rx issued for use as desired.  Recommend full-time wear.  Ms. Linn no longer wearing CLs.    Recheck in one year - or prior if any problems noted in the interim.

## 2018-03-21 ENCOUNTER — OFFICE VISIT (OUTPATIENT)
Dept: INTERNAL MEDICINE | Facility: CLINIC | Age: 68
End: 2018-03-21
Payer: MEDICARE

## 2018-03-21 ENCOUNTER — LAB VISIT (OUTPATIENT)
Dept: LAB | Facility: HOSPITAL | Age: 68
End: 2018-03-21
Attending: INTERNAL MEDICINE
Payer: MEDICARE

## 2018-03-21 VITALS
SYSTOLIC BLOOD PRESSURE: 152 MMHG | HEART RATE: 60 BPM | WEIGHT: 235.88 LBS | DIASTOLIC BLOOD PRESSURE: 82 MMHG | HEIGHT: 67 IN | BODY MASS INDEX: 37.02 KG/M2

## 2018-03-21 DIAGNOSIS — I10 ESSENTIAL HYPERTENSION: ICD-10-CM

## 2018-03-21 DIAGNOSIS — L98.9 SKIN LESION: Primary | ICD-10-CM

## 2018-03-21 DIAGNOSIS — E03.9 HYPOTHYROIDISM, UNSPECIFIED TYPE: ICD-10-CM

## 2018-03-21 LAB
ALBUMIN SERPL BCP-MCNC: 3.7 G/DL
ALP SERPL-CCNC: 104 U/L
ALT SERPL W/O P-5'-P-CCNC: 15 U/L
ANION GAP SERPL CALC-SCNC: 9 MMOL/L
AST SERPL-CCNC: 13 U/L
BILIRUB SERPL-MCNC: 0.4 MG/DL
BUN SERPL-MCNC: 18 MG/DL
CALCIUM SERPL-MCNC: 11 MG/DL
CHLORIDE SERPL-SCNC: 105 MMOL/L
CO2 SERPL-SCNC: 27 MMOL/L
CREAT SERPL-MCNC: 0.9 MG/DL
EST. GFR  (AFRICAN AMERICAN): >60 ML/MIN/1.73 M^2
EST. GFR  (NON AFRICAN AMERICAN): >60 ML/MIN/1.73 M^2
GLUCOSE SERPL-MCNC: 131 MG/DL
POTASSIUM SERPL-SCNC: 3.2 MMOL/L
PROT SERPL-MCNC: 7 G/DL
SODIUM SERPL-SCNC: 141 MMOL/L
TSH SERPL DL<=0.005 MIU/L-ACNC: 2.02 UIU/ML

## 2018-03-21 PROCEDURE — 36415 COLL VENOUS BLD VENIPUNCTURE: CPT

## 2018-03-21 PROCEDURE — 84443 ASSAY THYROID STIM HORMONE: CPT

## 2018-03-21 PROCEDURE — 3077F SYST BP >= 140 MM HG: CPT | Mod: CPTII,S$GLB,, | Performed by: INTERNAL MEDICINE

## 2018-03-21 PROCEDURE — 80053 COMPREHEN METABOLIC PANEL: CPT

## 2018-03-21 PROCEDURE — 99214 OFFICE O/P EST MOD 30 MIN: CPT | Mod: S$GLB,,, | Performed by: INTERNAL MEDICINE

## 2018-03-21 PROCEDURE — 99999 PR PBB SHADOW E&M-EST. PATIENT-LVL IV: CPT | Mod: PBBFAC,,, | Performed by: INTERNAL MEDICINE

## 2018-03-21 PROCEDURE — 3079F DIAST BP 80-89 MM HG: CPT | Mod: CPTII,S$GLB,, | Performed by: INTERNAL MEDICINE

## 2018-03-21 RX ORDER — TRIAMTERENE AND HYDROCHLOROTHIAZIDE 37.5; 25 MG/1; MG/1
1 CAPSULE ORAL EVERY MORNING
Qty: 30 CAPSULE | Refills: 1 | Status: SHIPPED | OUTPATIENT
Start: 2018-03-21 | End: 2018-03-24 | Stop reason: ALTCHOICE

## 2018-03-21 RX ORDER — TRIAMTERENE AND HYDROCHLOROTHIAZIDE 37.5; 25 MG/1; MG/1
1 CAPSULE ORAL EVERY MORNING
Qty: 30 CAPSULE | Refills: 1 | Status: SHIPPED | OUTPATIENT
Start: 2018-03-21 | End: 2018-03-21 | Stop reason: SDUPTHER

## 2018-03-21 RX ORDER — DOXAZOSIN 8 MG/1
TABLET ORAL
Qty: 30 TABLET | Refills: 3 | Status: SHIPPED | OUTPATIENT
Start: 2018-03-21 | End: 2018-07-26 | Stop reason: SDUPTHER

## 2018-03-21 RX ORDER — ATENOLOL 25 MG/1
25 TABLET ORAL DAILY
Qty: 90 TABLET | Refills: 0 | Status: SHIPPED | OUTPATIENT
Start: 2018-03-21 | End: 2018-03-24 | Stop reason: ALTCHOICE

## 2018-03-21 RX ORDER — FELODIPINE 10 MG/1
10 TABLET, EXTENDED RELEASE ORAL DAILY
Qty: 90 TABLET | Refills: 1 | Status: SHIPPED | OUTPATIENT
Start: 2018-03-21 | End: 2018-12-21 | Stop reason: SDUPTHER

## 2018-03-21 RX ORDER — LOSARTAN POTASSIUM 100 MG/1
100 TABLET ORAL DAILY
Qty: 90 TABLET | Refills: 0 | Status: SHIPPED | OUTPATIENT
Start: 2018-03-21 | End: 2018-12-21 | Stop reason: SDUPTHER

## 2018-03-23 ENCOUNTER — PATIENT MESSAGE (OUTPATIENT)
Dept: ENDOCRINOLOGY | Facility: CLINIC | Age: 68
End: 2018-03-23

## 2018-03-24 ENCOUNTER — TELEPHONE (OUTPATIENT)
Dept: INTERNAL MEDICINE | Facility: CLINIC | Age: 68
End: 2018-03-24

## 2018-03-24 RX ORDER — METOPROLOL SUCCINATE 50 MG/1
TABLET, EXTENDED RELEASE ORAL
Qty: 30 TABLET | Refills: 1 | Status: SHIPPED | OUTPATIENT
Start: 2018-03-24 | End: 2018-04-27 | Stop reason: SDUPTHER

## 2018-03-24 RX ORDER — METOPROLOL SUCCINATE 50 MG/1
50 TABLET, EXTENDED RELEASE ORAL DAILY
Qty: 30 TABLET | Refills: 1 | Status: SHIPPED | OUTPATIENT
Start: 2018-03-24 | End: 2018-03-24 | Stop reason: SDUPTHER

## 2018-03-24 NOTE — TELEPHONE ENCOUNTER
Contacted patient about her lab result. Will discontinue dyazide and atenolol. Start toprol XL 50mg daily. RTC in 1 month

## 2018-03-25 NOTE — PROGRESS NOTES
Subjective:       Patient ID: Maral Linn is a 68 y.o. female.    Chief Complaint: Hypertension    HPI  She returns for management of hypertension.  She has had hypertension for over a year.  Current treatment has included medications outlined in medication list.  She denies chest pain or shortness of breath.  No palpitations.  Denies left arm or neck pain.    PAST MEDICAL HISTORY: Hypertension, carpal tunnel syndrome, diabetes, thyroid nodule,   hypothyroidism, hyperparathyroidism, status post hysterectomy, bladder repair,   cholecystectomy, metatarsal fracture, and liver hemangioma.     She had a colonoscopy July 2012     MEDICATIONS: Plendil 10 mg daily, Cardura 8 mg at bedtime , Cozaar 100 mg daily, metformin 500 mg twice a day, atenolol 25 mg daily     ALLERGIES: No known drug allergies.         Review of Systems   Constitutional: Negative for chills, fatigue, fever and unexpected weight change.   Respiratory: Negative for chest tightness and shortness of breath.    Cardiovascular: Negative for chest pain and palpitations.   Gastrointestinal: Negative for abdominal pain and blood in stool.   Neurological: Negative for dizziness, syncope, numbness and headaches.       Objective:      Physical Exam   HENT:   Right Ear: External ear normal.   Left Ear: External ear normal.   Nose: Nose normal.   Mouth/Throat: Oropharynx is clear and moist.   Eyes: Pupils are equal, round, and reactive to light.   Neck: Normal range of motion.   Cardiovascular: Normal rate and regular rhythm.    No murmur heard.  Pulmonary/Chest: Breath sounds normal.   Abdominal: She exhibits no distension. There is no hepatosplenomegaly. There is no tenderness.   Lymphadenopathy:     She has no cervical adenopathy.     She has no axillary adenopathy.   Neurological: She has normal strength and normal reflexes. No cranial nerve deficit or sensory deficit.       Assessment/Plan       Assessment and plan: Hypertension: Add Dyazide 1 by mouth  daily.  Return to clinic in 1 month blood pressure check.  Check CMP and TSH.  Discussed Pap smear, pelvic exam.  She declined all

## 2018-03-29 ENCOUNTER — PATIENT MESSAGE (OUTPATIENT)
Dept: ENDOCRINOLOGY | Facility: CLINIC | Age: 68
End: 2018-03-29

## 2018-03-29 RX ORDER — POTASSIUM CHLORIDE 1500 MG/1
20 TABLET, EXTENDED RELEASE ORAL DAILY
Qty: 30 TABLET | Refills: 3 | Status: SHIPPED | OUTPATIENT
Start: 2018-03-29 | End: 2018-06-20 | Stop reason: ALTCHOICE

## 2018-04-27 ENCOUNTER — OFFICE VISIT (OUTPATIENT)
Dept: INTERNAL MEDICINE | Facility: CLINIC | Age: 68
End: 2018-04-27
Payer: MEDICARE

## 2018-04-27 ENCOUNTER — LAB VISIT (OUTPATIENT)
Dept: LAB | Facility: HOSPITAL | Age: 68
End: 2018-04-27
Attending: INTERNAL MEDICINE
Payer: MEDICARE

## 2018-04-27 DIAGNOSIS — E11.9 DIABETES MELLITUS WITHOUT COMPLICATION: Primary | ICD-10-CM

## 2018-04-27 DIAGNOSIS — I10 HYPERTENSION, UNSPECIFIED TYPE: ICD-10-CM

## 2018-04-27 DIAGNOSIS — E11.9 DIABETES MELLITUS WITHOUT COMPLICATION: ICD-10-CM

## 2018-04-27 LAB
ANION GAP SERPL CALC-SCNC: 5 MMOL/L
BUN SERPL-MCNC: 14 MG/DL
CALCIUM SERPL-MCNC: 11.3 MG/DL
CHLORIDE SERPL-SCNC: 108 MMOL/L
CO2 SERPL-SCNC: 30 MMOL/L
CREAT SERPL-MCNC: 1 MG/DL
EST. GFR  (AFRICAN AMERICAN): >60 ML/MIN/1.73 M^2
EST. GFR  (NON AFRICAN AMERICAN): 58 ML/MIN/1.73 M^2
ESTIMATED AVG GLUCOSE: 143 MG/DL
GLUCOSE SERPL-MCNC: 109 MG/DL
HBA1C MFR BLD HPLC: 6.6 %
POTASSIUM SERPL-SCNC: 3.7 MMOL/L
SODIUM SERPL-SCNC: 143 MMOL/L

## 2018-04-27 PROCEDURE — 3075F SYST BP GE 130 - 139MM HG: CPT | Mod: CPTII,S$GLB,, | Performed by: INTERNAL MEDICINE

## 2018-04-27 PROCEDURE — 83036 HEMOGLOBIN GLYCOSYLATED A1C: CPT

## 2018-04-27 PROCEDURE — 36415 COLL VENOUS BLD VENIPUNCTURE: CPT

## 2018-04-27 PROCEDURE — 99999 PR PBB SHADOW E&M-EST. PATIENT-LVL IV: CPT | Mod: PBBFAC,,, | Performed by: INTERNAL MEDICINE

## 2018-04-27 PROCEDURE — 99214 OFFICE O/P EST MOD 30 MIN: CPT | Mod: S$GLB,,, | Performed by: INTERNAL MEDICINE

## 2018-04-27 PROCEDURE — 80048 BASIC METABOLIC PNL TOTAL CA: CPT

## 2018-04-27 PROCEDURE — 3079F DIAST BP 80-89 MM HG: CPT | Mod: CPTII,S$GLB,, | Performed by: INTERNAL MEDICINE

## 2018-04-27 PROCEDURE — 3044F HG A1C LEVEL LT 7.0%: CPT | Mod: CPTII,S$GLB,, | Performed by: INTERNAL MEDICINE

## 2018-04-27 RX ORDER — METOPROLOL SUCCINATE 50 MG/1
TABLET, EXTENDED RELEASE ORAL
Qty: 30 TABLET | Refills: 3 | Status: SHIPPED | OUTPATIENT
Start: 2018-04-27 | End: 2019-02-14 | Stop reason: SDUPTHER

## 2018-04-28 ENCOUNTER — TELEPHONE (OUTPATIENT)
Dept: INTERNAL MEDICINE | Facility: CLINIC | Age: 68
End: 2018-04-28

## 2018-04-28 DIAGNOSIS — E21.3 HYPERPARATHYROIDISM: Primary | ICD-10-CM

## 2018-04-28 NOTE — TELEPHONE ENCOUNTER
Please contact patient and inform her that her calcium level is continuing to increase, Likely due to her hyperparathyroidism. She will need to follow up with endocrinology. Order in,please schedule

## 2018-04-29 VITALS
OXYGEN SATURATION: 97 % | BODY MASS INDEX: 37.35 KG/M2 | HEART RATE: 64 BPM | TEMPERATURE: 98 F | WEIGHT: 238 LBS | DIASTOLIC BLOOD PRESSURE: 80 MMHG | SYSTOLIC BLOOD PRESSURE: 132 MMHG | HEIGHT: 67 IN

## 2018-04-30 NOTE — TELEPHONE ENCOUNTER
Message left for pt to call office to inform her that her calcium level is continuing to increase, Likely due to her hyperparathyroidism. She will need to follow up with endocrinology. Order in,please schedule

## 2018-04-30 NOTE — TELEPHONE ENCOUNTER
Spoke with pt and she stated verbalized understanding  Pt stated she wasn't around her calender to schedule but will call dr shea office to do so

## 2018-04-30 NOTE — PROGRESS NOTES
Subjective:       Patient ID: Maral Linn is a 68 y.o. female.    Chief Complaint: Hypertension    HPI  She returns for management of hypertension.  She has had hypertension for over a year.  Current treatment has included medications outlined in medication list.  She denies chest pain or shortness of breath.  No palpitations.  Denies left arm or neck pain.  She has diabetes.  Denies polyuria, polydipsia    Medications: See med card    Social history: Does not smoke, does not drink alcohol      Review of Systems   Constitutional: Negative for chills, fatigue, fever and unexpected weight change.   Respiratory: Negative for chest tightness and shortness of breath.    Cardiovascular: Negative for chest pain and palpitations.   Gastrointestinal: Negative for abdominal pain and blood in stool.   Neurological: Negative for dizziness, syncope, numbness and headaches.       Objective:      Physical Exam   HENT:   Right Ear: External ear normal.   Left Ear: External ear normal.   Nose: Nose normal.   Mouth/Throat: Oropharynx is clear and moist.   Eyes: Pupils are equal, round, and reactive to light.   Neck: Normal range of motion.   Cardiovascular: Normal rate and regular rhythm.    No murmur heard.  Pulmonary/Chest: Breath sounds normal.   Abdominal: She exhibits no distension. There is no hepatosplenomegaly. There is no tenderness.   Lymphadenopathy:     She has no cervical adenopathy.     She has no axillary adenopathy.   Neurological: She has normal strength and normal reflexes. No cranial nerve deficit or sensory deficit.       Assessment/Plan       Assessment and plan: 1.  Hypertension: Check BMP.  Schedule stress test  2.  Diabetes: Check A1c

## 2018-05-01 ENCOUNTER — HOSPITAL ENCOUNTER (OUTPATIENT)
Dept: CARDIOLOGY | Facility: CLINIC | Age: 68
Discharge: HOME | End: 2018-05-01
Attending: INTERNAL MEDICINE
Payer: MEDICARE

## 2018-05-01 ENCOUNTER — HOSPITAL ENCOUNTER (OUTPATIENT)
Dept: CARDIOLOGY | Facility: CLINIC | Age: 68
Discharge: HOME | End: 2018-05-01
Payer: MEDICARE

## 2018-05-01 DIAGNOSIS — I10 HYPERTENSION, UNSPECIFIED TYPE: ICD-10-CM

## 2018-05-01 LAB
DIASTOLIC DYSFUNCTION: NO
RETIRED EF AND QEF - SEE NOTES: 60 (ref 55–65)

## 2018-05-01 PROCEDURE — 93351 STRESS TTE COMPLETE: CPT | Mod: S$GLB,,, | Performed by: INTERNAL MEDICINE

## 2018-05-01 PROCEDURE — 93321 DOPPLER ECHO F-UP/LMTD STD: CPT | Mod: S$GLB,,, | Performed by: INTERNAL MEDICINE

## 2018-05-01 PROCEDURE — 93000 ELECTROCARDIOGRAM COMPLETE: CPT | Mod: S$GLB,,, | Performed by: INTERNAL MEDICINE

## 2018-05-09 ENCOUNTER — PATIENT MESSAGE (OUTPATIENT)
Dept: ADMINISTRATIVE | Facility: OTHER | Age: 68
End: 2018-05-09

## 2018-05-24 ENCOUNTER — PATIENT MESSAGE (OUTPATIENT)
Dept: ADMINISTRATIVE | Facility: OTHER | Age: 68
End: 2018-05-24

## 2018-05-30 ENCOUNTER — INITIAL CONSULT (OUTPATIENT)
Dept: DERMATOLOGY | Facility: CLINIC | Age: 68
End: 2018-05-30
Payer: MEDICARE

## 2018-05-30 DIAGNOSIS — L57.8 DIFFUSE PHOTODAMAGE OF SKIN: ICD-10-CM

## 2018-05-30 DIAGNOSIS — L81.4 LENTIGO: ICD-10-CM

## 2018-05-30 DIAGNOSIS — L72.0 EPIDERMOID CYST: Primary | ICD-10-CM

## 2018-05-30 PROCEDURE — 99202 OFFICE O/P NEW SF 15 MIN: CPT | Mod: S$GLB,,, | Performed by: DERMATOLOGY

## 2018-05-30 PROCEDURE — 99999 PR PBB SHADOW E&M-EST. PATIENT-LVL II: CPT | Mod: PBBFAC,,, | Performed by: DERMATOLOGY

## 2018-05-30 RX ORDER — PNEUMOCOCCAL VACCINE POLYVALENT 25; 25; 25; 25; 25; 25; 25; 25; 25; 25; 25; 25; 25; 25; 25; 25; 25; 25; 25; 25; 25; 25; 25 UG/.5ML; UG/.5ML; UG/.5ML; UG/.5ML; UG/.5ML; UG/.5ML; UG/.5ML; UG/.5ML; UG/.5ML; UG/.5ML; UG/.5ML; UG/.5ML; UG/.5ML; UG/.5ML; UG/.5ML; UG/.5ML; UG/.5ML; UG/.5ML; UG/.5ML; UG/.5ML; UG/.5ML; UG/.5ML; UG/.5ML
INJECTION, SOLUTION INTRAMUSCULAR; SUBCUTANEOUS
COMMUNITY
Start: 2018-03-21

## 2018-05-30 NOTE — PROGRESS NOTES
Subjective:       Patient ID:  Maral Linn is a 68 y.o. female who presents for   Chief Complaint   Patient presents with    Cyst     back and neck     HPI  69 yo female here to discuss removal of two growths. One on left neck that has been drained in past but did not go away; the other on right upper back.  Not hurting, pain, or drainage. Not itchy but source of irritation given locations.    Hx of keloids in past after surgeries.    Review of Systems   Skin: Positive for tendency to form keloidal scars. Negative for itching.   Hematologic/Lymphatic: Does not bruise/bleed easily.        Objective:    Physical Exam   Constitutional: She appears well-developed and well-nourished. No distress.   Neurological: She is alert and oriented to person, place, and time. She is not disoriented.   Psychiatric: She has a normal mood and affect.   Skin:   Areas Examined (abnormalities noted in diagram):   Head / Face Inspection Performed  Neck Inspection Performed  Chest / Axilla Inspection Performed  Back Inspection Performed  RUE Inspected  LUE Inspection Performed              Diagram Legend     Erythematous scaling macule/papule c/w actinic keratosis       Vascular papule c/w angioma      Pigmented verrucoid papule/plaque c/w seborrheic keratosis      Yellow umbilicated papule c/w sebaceous hyperplasia      Irregularly shaped tan macule c/w lentigo     1-2 mm smooth white papules consistent with Milia      Movable subcutaneous cyst with punctum c/w epidermal inclusion cyst      Subcutaneous movable cyst c/w pilar cyst      Firm pink to brown papule c/w dermatofibroma      Pedunculated fleshy papule(s) c/w skin tag(s)      Evenly pigmented macule c/w junctional nevus     Mildly variegated pigmented, slightly irregular-bordered macule c/w mildly atypical nevus      Flesh colored to evenly pigmented papule c/w intradermal nevus       Pink pearly papule/plaque c/w basal cell carcinoma      Erythematous hyperkeratotic  cursted plaque c/w SCC      Surgical scar with no sign of skin cancer recurrence      Open and closed comedones      Inflammatory papules and pustules      Verrucoid papule consistent consistent with wart     Erythematous eczematous patches and plaques     Dystrophic onycholytic nail with subungual debris c/w onychomycosis     Umbilicated papule    Erythematous-base heme-crusted tan verrucoid plaque consistent with inflamed seborrheic keratosis     Erythematous Silvery Scaling Plaque c/w Psoriasis     See annotation      Assessment / Plan:        Epidermoid cyst  -RV for excision  -given hx of keloids, advise close monitoring post operatively and use of Scar Away gel    Diffuse photodamage of skin    Lentigo  This is a benign hyperpigmented sun induced lesion. Daily sun protection will reduce the number of new lesions. Treatment of these benign lesions are considered cosmetic.             Follow-up in about 4 weeks (around 6/27/2018).

## 2018-05-30 NOTE — LETTER
May 30, 2018      Rosemary Pearson MD  [address]  [place] [address]           [facility]  [address]  [facility] [address]  Phone: [phone]  Fax: [phone]          Patient: Maral Linn   MR Number: [id number]   YOB: 1950   Date of Visit: 5/30/2018       Dear Dr. Rosemary Pearson:    Thank you for referring Maral Linn to me for evaluation. Attached you will find relevant portions of my assessment and plan of care.    If you have questions, please do not hesitate to call me. I look forward to following Maral Linn along with you.    Sincerely,    Swetha Luz MD    Enclosure  CC:  No Recipients    If you would like to receive this communication electronically, please contact [email or web address] or [phone] to request more information on [facility] access.    For providers and/or their staff who would like to refer a patient to [facility], please contact us through our one-stop-shop provider referral line, [facility], at [phone].    If you feel you have received this communication in error or would no longer like to receive these types of communications, please e-mail [email or web address]

## 2018-06-20 ENCOUNTER — OFFICE VISIT (OUTPATIENT)
Dept: URGENT CARE | Facility: CLINIC | Age: 68
End: 2018-06-20
Payer: MEDICARE

## 2018-06-20 VITALS
TEMPERATURE: 98 F | RESPIRATION RATE: 17 BRPM | WEIGHT: 235 LBS | HEART RATE: 66 BPM | OXYGEN SATURATION: 96 % | SYSTOLIC BLOOD PRESSURE: 173 MMHG | BODY MASS INDEX: 36.88 KG/M2 | HEIGHT: 67 IN | DIASTOLIC BLOOD PRESSURE: 86 MMHG

## 2018-06-20 DIAGNOSIS — S59.901A ELBOW INJURY, RIGHT, INITIAL ENCOUNTER: Primary | ICD-10-CM

## 2018-06-20 DIAGNOSIS — S51.011A ELBOW LACERATION, RIGHT, INITIAL ENCOUNTER: ICD-10-CM

## 2018-06-20 PROCEDURE — 99214 OFFICE O/P EST MOD 30 MIN: CPT | Mod: S$GLB,,, | Performed by: NURSE PRACTITIONER

## 2018-06-20 RX ORDER — MUPIROCIN 20 MG/G
OINTMENT TOPICAL
Qty: 22 G | Refills: 0 | Status: SHIPPED | OUTPATIENT
Start: 2018-06-20

## 2018-06-20 RX ORDER — NAPROXEN 500 MG/1
500 TABLET ORAL 2 TIMES DAILY WITH MEALS
Qty: 20 TABLET | Refills: 0 | Status: SHIPPED | OUTPATIENT
Start: 2018-06-20 | End: 2018-06-30

## 2018-06-20 RX ORDER — CLINDAMYCIN HYDROCHLORIDE 300 MG/1
300 CAPSULE ORAL 4 TIMES DAILY
Qty: 28 CAPSULE | Refills: 0 | Status: SHIPPED | OUTPATIENT
Start: 2018-06-20 | End: 2018-06-27

## 2018-06-20 NOTE — PROGRESS NOTES
"Subjective:       Patient ID: Maral Linn is a 68 y.o. female.    Vitals:  height is 5' 7" (1.702 m) and weight is 106.6 kg (235 lb). Her tympanic temperature is 98 °F (36.7 °C). Her blood pressure is 173/86 (abnormal) and her pulse is 66. Her respiration is 17 and oxygen saturation is 96%.     Chief Complaint: Laceration (Pt fell in bathroom on the carpet, 06/19/2018)    The patient was sent here from the [facility] for evaluation of a right elbow laceration and pain. She denies numbness and tingling to her left arm. She denies shoulder or wrist pain. She has full mobility in there right elbow with minimal pain. The injury occurred last night. She has history of GERD and admits to eating a late dinner and vomiting. She slip and fell while trying to go to the restroom. She has a small laceration to the right elbow approx 2 cm. Her main concern an xray to ensure her right elbow is not broken. NVM intact. She denies LOC.       Laceration    The incident occurred 12 to 24 hours ago. The laceration is located on the right arm. The laceration is 1 cm in size. Injury mechanism: Carpet. The pain is at a severity of 10/10. The pain is severe. The pain has been constant since onset. She reports no foreign bodies present. Her tetanus status is unknown.     Review of Systems   Constitution: Negative for weakness and malaise/fatigue.   HENT: Negative for nosebleeds.    Cardiovascular: Negative for chest pain and syncope.   Respiratory: Negative for shortness of breath.    Skin: Positive for color change.   Musculoskeletal: Positive for joint pain (right elbow) and joint swelling. Negative for back pain and neck pain.   Gastrointestinal: Negative for abdominal pain.   Genitourinary: Negative for hematuria.   Neurological: Negative for dizziness and numbness.       Objective:      Physical Exam   Constitutional: She is oriented to person, place, and time. She appears well-developed and well-nourished.   HENT:   Head: " Normocephalic and atraumatic. Head is without abrasion, without contusion and without laceration.   Right Ear: External ear normal.   Left Ear: External ear normal.   Nose: Nose normal.   Mouth/Throat: Oropharynx is clear and moist.   Eyes: Conjunctivae, EOM and lids are normal. Pupils are equal, round, and reactive to light.   Neck: Trachea normal, full passive range of motion without pain and phonation normal. Neck supple.   Cardiovascular: Normal rate, regular rhythm and normal heart sounds.    Pulses:       Radial pulses are 2+ on the right side, and 2+ on the left side.   Pulmonary/Chest: Effort normal and breath sounds normal. No stridor. No respiratory distress.   Musculoskeletal: Normal range of motion.        Right elbow: She exhibits swelling and laceration. She exhibits normal range of motion, no effusion and no deformity.   Neurological: She is alert and oriented to person, place, and time.   Skin: Skin is warm and dry. Capillary refill takes less than 2 seconds. Laceration (small superficial laceration to right elbow, 2 cm. ) noted. No abrasion, no bruising, no burn, no ecchymosis, no lesion and no rash noted. There is erythema (minimal redness to right elbow ).   Small superficial laceration to the right elbow. Minimal redness present. Wound edges approximated approx 1 cm deep. Wound cleaned with betadine and steri-strips applied. Bactroban applied. NVM intact.    Psychiatric: She has a normal mood and affect. Her speech is normal and behavior is normal. Judgment and thought content normal. Cognition and memory are normal.   Nursing note and vitals reviewed.        Reading Physician Reading Date Result Priority   Keegan Santos MD 6/20/2018    Narrative     EXAMINATION:  XR ELBOW COMPLETE 3 VIEW RIGHT    CLINICAL HISTORY:  . Unspecified injury of right elbow, initial encounter    TECHNIQUE:  AP, lateral, and oblique views of the right elbow were performed.    COMPARISON:  None    FINDINGS:  Overall  alignment is within normal limits.  No displaced fracture, dislocation or destructive osseous process.  Joint spaces appear maintained.  No large elbow joint effusion seen.  Skin irregularity noted along the dorsal aspect of the proximal forearm suggesting laceration.  No subcutaneous emphysema or radiodense retained foreign body.      Impression       Dorsal proximal right forearm suspected soft tissue laceration, without acute displaced fracture-dislocation or radiodense retained foreign body identified.     Laceration Repair  Date/Time: 6/20/2018 1:12 PM  Performed by: SKYLAR CHAPARRO  Authorized by: SKYLAR CHAPARRO   Consent Done: Yes  Consent: Verbal consent obtained.  Risks and benefits: risks, benefits and alternatives were discussed  Consent given by: patient  Body area: upper extremity  Location details: right elbow  Laceration length: 2 cm  Foreign bodies: no foreign bodies  Tendon involvement: none  Nerve involvement: none  Vascular damage: no  Irrigation solution: saline  Irrigation method: syringe  Amount of cleaning: standard  Debridement: none  Degree of undermining: none  Skin closure: Steri-Strips (2 steri-strips)  Approximation: loose  Dressing: antibiotic ointment  Patient tolerance: Patient tolerated the procedure well with no immediate complications  Comments: Small laceration to right elbow, wound cleaned and steri-strips applied. Xray negative. Bactroban applied with non-stick dressing and coban.         Assessment:       1. Elbow injury, right, initial encounter    2. Elbow laceration, right, initial encounter        Plan:         Elbow injury, right, initial encounter  -     X-Ray Elbow Complete Right; Future; Expected date: 06/20/2018  -     naproxen (NAPROSYN) 500 MG tablet; Take 1 tablet (500 mg total) by mouth 2 (two) times daily with meals.  Dispense: 20 tablet; Refill: 0    Elbow laceration, right, initial encounter  -     diphth, pertus (ACEL), tet vac (PF) (ADULT); Inject  0.5 mLs into the muscle once.  -     mupirocin (BACTROBAN) 2 % ointment; Apply to affected area 3 times daily  Dispense: 22 g; Refill: 0  -     X-Ray Elbow Complete Right; Future; Expected date: 06/20/2018  -     clindamycin (CLEOCIN) 300 MG capsule; Take 1 capsule (300 mg total) by mouth 4 (four) times daily.  Dispense: 28 capsule; Refill: 0    Other orders  -     LACERATION REPAIR      Patient Instructions     Extremity Laceration: Sutures, Staples, or Tape  A laceration is a cut through the skin. If it is deep, it may require stitches (sutures) or staples to close so it can heal. Minor cuts may be treated with surgical tape closures.   X-rays may be done if something may have entered the skin through the cut. You may also need a tetanus shot if you are not up to date on this vaccination.  Home care  · Follow the health care providers instructions on how to care for the cut.  · Wash your hands with soap and warm water before and after caring for your wound. This is to help prevent infection.  · Keep the wound clean and dry. If a bandage was applied and it becomes wet or dirty, replace it. Otherwise, leave it in place for the first 24 hours, then change it once a day or as directed.  · If sutures or staples were used, clean the wound daily:  · After removing the bandage, wash the area with soap and water. Use a wet cotton swab to loosen and remove any blood or crust that forms.  · After cleaning, keep the wound clean and dry. Talk with your doctor before applying any antibiotic ointment to the wound. Reapply the bandage.  · You may remove the bandage to shower as usual after the first 24 hours, but do not soak the area in water (no swimming) until the stitches or staples are removed.  · If surgical tape closures were used, keep the area clean and dry. If it becomes wet, blot it dry with a towel.  · The doctor may prescribe an antibiotic cream or ointment to prevent infection. Do not stop taking this medication  until you have finished the prescribed course or the doctor tells you to stop. The doctor may also prescribe medications for pain. Follow the doctors instructions for taking these medications.  · Avoid activities that may reopen your wound.  Follow-up care  Follow up with your health care provider. Most skin wounds heal within ten days. However, an infection may sometimes occur despite proper treatment. Therefore, check the wound daily for the signs of infection listed below. Stitches and staples should be removed within 7-14 days. If surgical tape closures were used, you may remove them after 10 days if they have not fallen off by then.   When to seek medical advice  Call your health care provider right away if any of these occur:  · Wound bleeding not controlled by direct pressure  · Signs of infection, including increasing pain in the wound, increasing wound redness or swelling, or pus or bad odor coming from the wound  · Fever of 100.4°F (38ºC) or higher or as directed by your healthcare provider  · Stitches or staples come apart or fall out or surgical tape falls off before 7 days  · Wound edges re-open  · Wound changes colors  · Numbness around the wound   · Decreased movement around the injured area  Date Last Reviewed: 6/14/2015  © 1442-9521 [employer]. [address] [place] [address], [place] [address]. All rights reserved. This information is not intended as a substitute for professional medical care. Always follow your healthcare professional's instructions.        Laceration: All Closures  A laceration is a cut through the skin. This will usually require stitches (sutures) or staples if it is deep. Minor cuts may be treated with a surgical tape closure or skin glue.    Home care  · Your healthcare provider may prescribe an antibiotic. This is to help prevent infection. Follow all instructions for taking this medicine. Take the medicine every day until it is gone or you are told to stop. You should  not have any left over.  · The healthcare provider may prescribe medicines for pain. Follow instructions for taking them.  · Follow the healthcare providers instructions on how to care for the cut.  · Keep the wound clean and dry. Do not get the wound wet until you are told it is okay to do so. If the area gets wet, gently pat it dry with a clean cloth. Replace the wet bandage with a dry one.  · If a bandage was applied and it becomes wet or dirty, replace it. Otherwise, leave it in place for the first 24 hours.  · Caring for sutures or staples: Once you no longer need to keep them dry, clean the wound daily. First, remove the bandage. Then wash the area gently with soap and warm water, or as directed by the health care provider. Use a wet cotton swab to loosen and remove any blood or crust that forms. After cleaning, apply a thin layer of antibiotic ointment if advised. Then put on a new bandage unless you are told not to.  · Caring for skin glue: Dont put apply liquid, ointment, or cream on the wound while the glue is in place. Avoid activities that cause heavy sweating. Protect the wound from sunlight. Do not scratch, rub, or pick at the adhesive film. Do not place tape directly over the film. The glue should peel off within 5 to 10 days.   · Caring for surgical tape: Keep the area dry. If it gets wet, blot it dry with a clean towel. Surgical tape usually falls off within 7 to 10 days. If it has not fallen off after 10 days, you can take it off yourself. Put mineral oil or petroleum jelly on a cotton ball and gently rub the tape until it is removed.  · Once you can get the wound wet, you may shower as usual but do not soak the wound in water (no tub baths or swimming)  · Even with proper treatment, a wound infection may sometimes occur. Check the wound daily for signs of infection listed below.  Scalp wounds  During the first two days, you may carefully rinse your hair in the shower to remove blood, glass or  dirt particles. After two days, you may shower and shampoo your hair normally. Do not soak your scalp in the tub or go swimming until the stitches or staples have been removed. Talk with your healthcare provider before applying any antibiotic ointment to the wound.  Mouth wounds  Eat soft foods to reduce pain. If the cut is inside of your mouth, clean by rinsing after each meal and at bedtime with a mixture of equal parts water and hydrogen peroxide (do not swallow!). Or, you can use a cotton swab to directly apply hydrogen peroxide onto the cut. Mouth wounds can be painful when eating. You may use an over-the-counter local numbing solution for pain relief. If this is not available, you may use any numbing solution intended for teething babies. You may apply this directly to the sores with a cotton-tip swab or with your finger.  Follow-up care  Follow up with your healthcare provider as advised. Ask your healthcare provider how long sutures should be left in place. Be sure to return for suture removal as directed. If dissolving stitches were used in the mouth, these should fall out or dissolve without the need for removal. If tape closures were used, remove them yourself when your provider recommends if they have not fallen off on their own. If skin glue was used, the film will wear off by itself.  When to seek medical advice  Call your healthcare provider right away if any of these occur:  · Wound bleeding not controlled by direct pressure  · Signs of infection, including increasing pain in the wound, increasing wound redness or swelling, or pus or bad odor coming from the wound  · Fever of 100.4°F (38.ºC) or higher or as directed by your healthcare provider  · Stitches or staples come apart or fall out or surgical tape falls off before 7 days  · Wound edges re-open  · Wound changes colors  · Numbness around the wound   · Decreased movement around the injured area  Date Last Reviewed: 6/14/2015  © 1803-6615 [employer]. [address] [place] [address], [place] [address]. All rights reserved. This information is not intended as a substitute for professional medical care. Always follow your healthcare professional's instructions.        Old Laceration: Not Sutured  A laceration is a cut through the skin. This will usually require stitches if it is deep. However, if a laceration remains open for too long, the risk of infection increases. In your case, too much time has passed before coming for treatment. The danger of infection from suturing at this time is too high. That is why your wound was not sutured.  If the wound is spread open, it will heal by filling in from the bottom and sides. A wound that is not sutured may take 1 to 4 weeks to heal, depending on the size of the opening. A visible scar will probably occur. You can discuss revision of the scar with your healthcare provider at a later time.  Home care  The following guidelines will help you care for your laceration at home:  · Keep the wound clean and dry. If a bandage was applied and it becomes wet or dirty, replace it. Otherwise, leave it in place for the first 24 hours, then change it once a day or as directed.  · Clean the wound daily:  ¨ After removing any bandage, wash the area with soap and water. Use a wet cotton swab to loosen and remove any blood or crust that forms.  ¨ Talk with your doctor before applying any antibiotic ointment to the wound. Reapply a fresh bandage.  ¨ You may remove the bandage to shower as usual after the first 24 hours, but do not soak the area in water (no tub baths or swimming) for the next five days. Avoid activities that may reinjure your wound.  · The healthcare provider may prescribe an antibiotic cream or ointment to prevent infection. Do not stop using this medication until you have finished the prescribed course or the provider tells you to stop.  · The healthcare provider may also prescribe medications for pain.  Follow instructions for taking these medications.  · Check the wound daily for signs of infection listed below.  Follow-up care  Follow up with your healthcare provider as advised.  When to seek medical advice  Call your healthcare provider right away if any of these occur:  · Wound bleeding not controlled by direct pressure  · Signs of infection, including increasing pain in the wound, increasing wound redness or swelling, or pus or bad odor coming from the wound  · Fever of 100.4°F (38.ºC) or higher or as directed by your healthcare provider  · Wound edges re-open  · Wound changes colors  · Numbness around the wound   · Decreased movement around the injured area  Date Last Reviewed: 6/10/2015  © 5889-7004 [employer]. [address] [place] [address], [place] [address]. All rights reserved. This information is not intended as a substitute for professional medical care. Always follow your healthcare professional's instructions.      -as we discussed today, your right elbow xray was negative for a fracture.  -You would was cleaned and steri-strips were applied. Allow the steri-strips to fall off in the next 48 hours.  -You have been given a prescription for bactroban (antibiotic ointment) to apply to the site 2 times a day for 3-5 days.  -You were given an updated Tetanus shot today.  -You have been placed on Clindamycin which is an antibiotic for the next 7 days.  -Keep the elbow clean and covered. You may clean with water and regular soap.   -Follow up with your primary care doctor.  -If symptoms worsen go to the ER.

## 2018-06-20 NOTE — PATIENT INSTRUCTIONS
Extremity Laceration: Sutures, Staples, or Tape  A laceration is a cut through the skin. If it is deep, it may require stitches (sutures) or staples to close so it can heal. Minor cuts may be treated with surgical tape closures.   X-rays may be done if something may have entered the skin through the cut. You may also need a tetanus shot if you are not up to date on this vaccination.  Home care  · Follow the health care providers instructions on how to care for the cut.  · Wash your hands with soap and warm water before and after caring for your wound. This is to help prevent infection.  · Keep the wound clean and dry. If a bandage was applied and it becomes wet or dirty, replace it. Otherwise, leave it in place for the first 24 hours, then change it once a day or as directed.  · If sutures or staples were used, clean the wound daily:  · After removing the bandage, wash the area with soap and water. Use a wet cotton swab to loosen and remove any blood or crust that forms.  · After cleaning, keep the wound clean and dry. Talk with your doctor before applying any antibiotic ointment to the wound. Reapply the bandage.  · You may remove the bandage to shower as usual after the first 24 hours, but do not soak the area in water (no swimming) until the stitches or staples are removed.  · If surgical tape closures were used, keep the area clean and dry. If it becomes wet, blot it dry with a towel.  · The doctor may prescribe an antibiotic cream or ointment to prevent infection. Do not stop taking this medication until you have finished the prescribed course or the doctor tells you to stop. The doctor may also prescribe medications for pain. Follow the doctors instructions for taking these medications.  · Avoid activities that may reopen your wound.  Follow-up care  Follow up with your health care provider. Most skin wounds heal within ten days. However, an infection may sometimes occur despite proper treatment.  Therefore, check the wound daily for the signs of infection listed below. Stitches and staples should be removed within 7-14 days. If surgical tape closures were used, you may remove them after 10 days if they have not fallen off by then.   When to seek medical advice  Call your health care provider right away if any of these occur:  · Wound bleeding not controlled by direct pressure  · Signs of infection, including increasing pain in the wound, increasing wound redness or swelling, or pus or bad odor coming from the wound  · Fever of 100.4°F (38ºC) or higher or as directed by your healthcare provider  · Stitches or staples come apart or fall out or surgical tape falls off before 7 days  · Wound edges re-open  · Wound changes colors  · Numbness around the wound   · Decreased movement around the injured area  Date Last Reviewed: 6/14/2015  © 8376-2911 [employer]. [address] [place] [address]. All rights reserved. This information is not intended as a substitute for professional medical care. Always follow your healthcare professional's instructions.        Laceration: All Closures  A laceration is a cut through the skin. This will usually require stitches (sutures) or staples if it is deep. Minor cuts may be treated with a surgical tape closure or skin glue.    Home care  · Your healthcare provider may prescribe an antibiotic. This is to help prevent infection. Follow all instructions for taking this medicine. Take the medicine every day until it is gone or you are told to stop. You should not have any left over.  · The healthcare provider may prescribe medicines for pain. Follow instructions for taking them.  · Follow the healthcare providers instructions on how to care for the cut.  · Keep the wound clean and dry. Do not get the wound wet until you are told it is okay to do so. If the area gets wet, gently pat it dry with a clean cloth. Replace the wet bandage with a dry one.  · If a  bandage was applied and it becomes wet or dirty, replace it. Otherwise, leave it in place for the first 24 hours.  · Caring for sutures or staples: Once you no longer need to keep them dry, clean the wound daily. First, remove the bandage. Then wash the area gently with soap and warm water, or as directed by the health care provider. Use a wet cotton swab to loosen and remove any blood or crust that forms. After cleaning, apply a thin layer of antibiotic ointment if advised. Then put on a new bandage unless you are told not to.  · Caring for skin glue: Dont put apply liquid, ointment, or cream on the wound while the glue is in place. Avoid activities that cause heavy sweating. Protect the wound from sunlight. Do not scratch, rub, or pick at the adhesive film. Do not place tape directly over the film. The glue should peel off within 5 to 10 days.   · Caring for surgical tape: Keep the area dry. If it gets wet, blot it dry with a clean towel. Surgical tape usually falls off within 7 to 10 days. If it has not fallen off after 10 days, you can take it off yourself. Put mineral oil or petroleum jelly on a cotton ball and gently rub the tape until it is removed.  · Once you can get the wound wet, you may shower as usual but do not soak the wound in water (no tub baths or swimming)  · Even with proper treatment, a wound infection may sometimes occur. Check the wound daily for signs of infection listed below.  Scalp wounds  During the first two days, you may carefully rinse your hair in the shower to remove blood, glass or dirt particles. After two days, you may shower and shampoo your hair normally. Do not soak your scalp in the tub or go swimming until the stitches or staples have been removed. Talk with your healthcare provider before applying any antibiotic ointment to the wound.  Mouth wounds  Eat soft foods to reduce pain. If the cut is inside of your mouth, clean by rinsing after each meal and at bedtime with a  mixture of equal parts water and hydrogen peroxide (do not swallow!). Or, you can use a cotton swab to directly apply hydrogen peroxide onto the cut. Mouth wounds can be painful when eating. You may use an over-the-counter local numbing solution for pain relief. If this is not available, you may use any numbing solution intended for teething babies. You may apply this directly to the sores with a cotton-tip swab or with your finger.  Follow-up care  Follow up with your healthcare provider as advised. Ask your healthcare provider how long sutures should be left in place. Be sure to return for suture removal as directed. If dissolving stitches were used in the mouth, these should fall out or dissolve without the need for removal. If tape closures were used, remove them yourself when your provider recommends if they have not fallen off on their own. If skin glue was used, the film will wear off by itself.  When to seek medical advice  Call your healthcare provider right away if any of these occur:  · Wound bleeding not controlled by direct pressure  · Signs of infection, including increasing pain in the wound, increasing wound redness or swelling, or pus or bad odor coming from the wound  · Fever of 100.4°F (38.ºC) or higher or as directed by your healthcare provider  · Stitches or staples come apart or fall out or surgical tape falls off before 7 days  · Wound edges re-open  · Wound changes colors  · Numbness around the wound   · Decreased movement around the injured area  Date Last Reviewed: 6/14/2015  © 3854-0487 [employer]. [address] [place] [address], [place] [address]. All rights reserved. This information is not intended as a substitute for professional medical care. Always follow your healthcare professional's instructions.        Old Laceration: Not Sutured  A laceration is a cut through the skin. This will usually require stitches if it is deep. However, if a laceration remains open for too long,  the risk of infection increases. In your case, too much time has passed before coming for treatment. The danger of infection from suturing at this time is too high. That is why your wound was not sutured.  If the wound is spread open, it will heal by filling in from the bottom and sides. A wound that is not sutured may take 1 to 4 weeks to heal, depending on the size of the opening. A visible scar will probably occur. You can discuss revision of the scar with your healthcare provider at a later time.  Home care  The following guidelines will help you care for your laceration at home:  · Keep the wound clean and dry. If a bandage was applied and it becomes wet or dirty, replace it. Otherwise, leave it in place for the first 24 hours, then change it once a day or as directed.  · Clean the wound daily:  ¨ After removing any bandage, wash the area with soap and water. Use a wet cotton swab to loosen and remove any blood or crust that forms.  ¨ Talk with your doctor before applying any antibiotic ointment to the wound. Reapply a fresh bandage.  ¨ You may remove the bandage to shower as usual after the first 24 hours, but do not soak the area in water (no tub baths or swimming) for the next five days. Avoid activities that may reinjure your wound.  · The healthcare provider may prescribe an antibiotic cream or ointment to prevent infection. Do not stop using this medication until you have finished the prescribed course or the provider tells you to stop.  · The healthcare provider may also prescribe medications for pain. Follow instructions for taking these medications.  · Check the wound daily for signs of infection listed below.  Follow-up care  Follow up with your healthcare provider as advised.  When to seek medical advice  Call your healthcare provider right away if any of these occur:  · Wound bleeding not controlled by direct pressure  · Signs of infection, including increasing pain in the wound, increasing wound  redness or swelling, or pus or bad odor coming from the wound  · Fever of 100.4°F (38.ºC) or higher or as directed by your healthcare provider  · Wound edges re-open  · Wound changes colors  · Numbness around the wound   · Decreased movement around the injured area  Date Last Reviewed: 6/10/2015  © 7778-4030 [employer]. [address] [place] [address]. All rights reserved. This information is not intended as a substitute for professional medical care. Always follow your healthcare professional's instructions.      -as we discussed today, your right elbow xray was negative for a fracture.  -You would was cleaned and steri-strips were applied. Allow the steri-strips to fall off in the next 48 hours.  -You have been given a prescription for bactroban (antibiotic ointment) to apply to the site 2 times a day for 3-5 days.  -You were given an updated Tetanus shot today.  -You have been placed on Clindamycin which is an antibiotic for the next 7 days.  -Keep the elbow clean and covered. You may clean with water and regular soap.   -Follow up with your primary care doctor.  -If symptoms worsen go to the ER.

## 2018-06-22 ENCOUNTER — OFFICE VISIT (OUTPATIENT)
Dept: PODIATRY | Facility: CLINIC | Age: 68
End: 2018-06-22
Payer: MEDICARE

## 2018-06-22 VITALS
SYSTOLIC BLOOD PRESSURE: 168 MMHG | WEIGHT: 244 LBS | DIASTOLIC BLOOD PRESSURE: 80 MMHG | HEIGHT: 68 IN | HEART RATE: 64 BPM | BODY MASS INDEX: 36.98 KG/M2

## 2018-06-22 DIAGNOSIS — E11.9 CONTROLLED TYPE 2 DIABETES MELLITUS WITHOUT COMPLICATION, WITHOUT LONG-TERM CURRENT USE OF INSULIN: Primary | ICD-10-CM

## 2018-06-22 PROCEDURE — 3044F HG A1C LEVEL LT 7.0%: CPT | Mod: CPTII,S$GLB,, | Performed by: PODIATRIST

## 2018-06-22 PROCEDURE — 3079F DIAST BP 80-89 MM HG: CPT | Mod: CPTII,S$GLB,, | Performed by: PODIATRIST

## 2018-06-22 PROCEDURE — 3077F SYST BP >= 140 MM HG: CPT | Mod: CPTII,S$GLB,, | Performed by: PODIATRIST

## 2018-06-22 PROCEDURE — 99999 PR PBB SHADOW E&M-EST. PATIENT-LVL III: CPT | Mod: PBBFAC,,, | Performed by: PODIATRIST

## 2018-06-22 PROCEDURE — 99213 OFFICE O/P EST LOW 20 MIN: CPT | Mod: S$GLB,,, | Performed by: PODIATRIST

## 2018-06-26 NOTE — PROGRESS NOTES
Subjective:      Patient ID: Maral Linn is a 68 y.o. female.    Chief Complaint: Diabetes Mellitus (dr alvarado 04/27/2018) and Diabetic Foot Exam    Maral is a 68 y.o. female who presents to the clinic upon referral from Dr. Hetal horta. provider found  for evaluation and treatment of diabetic feet. Maral has a past medical history of Allergy; Arthritis; Cataract; Hyperlipidemia (11/20/2014); Hyperparathyroidism (11/20/2014); Hypertension; Hypertension (11/20/2014); Joint pain; Keloid cicatrix; Obesity (BMI 30-39.9) (11/20/2014); Thyroid disease; Type II or unspecified type diabetes mellitus without mention of complication, uncontrolled (11/20/2014); and Vitamin D deficiency (11/20/2014). Patient relates no major problem with feet. Only complaints today consist of yearly DM foot examination  .    PCP: Rosemary Alvarado MD    Date Last Seen by PCP:   Chief Complaint   Patient presents with    Diabetes Mellitus     dr alvarado 04/27/2018    Diabetic Foot Exam         Current shoe gear: Casual shoes    Hemoglobin A1C   Date Value Ref Range Status   04/27/2018 6.6 (H) 4.0 - 5.6 % Final     Comment:     According to ADA guidelines, hemoglobin A1c <7.0% represents  optimal control in non-pregnant diabetic patients. Different  metrics may apply to specific patient populations.   Standards of Medical Care in Diabetes-2016.  For the purpose of screening for the presence of diabetes:  <5.7%     Consistent with the absence of diabetes  5.7-6.4%  Consistent with increasing risk for diabetes   (prediabetes)  >or=6.5%  Consistent with diabetes  Currently, no consensus exists for use of hemoglobin A1c  for diagnosis of diabetes for children.  This Hemoglobin A1c assay has significant interference with fetal   hemoglobin   (HbF). The results are invalid for patients with abnormal amounts of   HbF,   including those with known Hereditary Persistence   of Fetal Hemoglobin. Heterozygous hemoglobin variants (HbAS, HbAC,   HbAD,  HbAE, HbA2) do not significantly interfere with this assay;   however, presence of multiple variants in a sample may impact the %   interference.     01/05/2018 6.3 (H) 4.0 - 5.6 % Final     Comment:     According to ADA guidelines, hemoglobin A1c <7.0% represents  optimal control in non-pregnant diabetic patients. Different  metrics may apply to specific patient populations.   Standards of Medical Care in Diabetes-2016.  For the purpose of screening for the presence of diabetes:  <5.7%     Consistent with the absence of diabetes  5.7-6.4%  Consistent with increasing risk for diabetes   (prediabetes)  >or=6.5%  Consistent with diabetes  Currently, no consensus exists for use of hemoglobin A1c  for diagnosis of diabetes for children.  This Hemoglobin A1c assay has significant interference with fetal   hemoglobin   (HbF). The results are invalid for patients with abnormal amounts of   HbF,   including those with known Hereditary Persistence   of Fetal Hemoglobin. Heterozygous hemoglobin variants (HbAS, HbAC,   HbAD, HbAE, HbA2) do not significantly interfere with this assay;   however, presence of multiple variants in a sample may impact the %   interference.     06/23/2017 6.6 (H) 4.0 - 5.6 % Final     Comment:     According to ADA guidelines, hemoglobin A1c <7.0% represents  optimal control in non-pregnant diabetic patients. Different  metrics may apply to specific patient populations.   Standards of Medical Care in Diabetes-2016.  For the purpose of screening for the presence of diabetes:  <5.7%     Consistent with the absence of diabetes  5.7-6.4%  Consistent with increasing risk for diabetes   (prediabetes)  >or=6.5%  Consistent with diabetes  Currently, no consensus exists for use of hemoglobin A1c  for diagnosis of diabetes for children.  This Hemoglobin A1c assay has significant interference with fetal   hemoglobin   (HbF). The results are invalid for patients with abnormal amounts of   HbF,   including those  "with known Hereditary Persistence   of Fetal Hemoglobin. Heterozygous hemoglobin variants (HbAS, HbAC,   HbAD, HbAE, HbA2) do not significantly interfere with this assay;   however, presence of multiple variants in a sample may impact the %   interference.             Review of Systems   Constitution: Negative for chills, decreased appetite and fever.   Cardiovascular: Negative for leg swelling.   Skin: Positive for dry skin.   Musculoskeletal: Negative for arthritis, joint pain, joint swelling and myalgias.   Gastrointestinal: Negative for nausea and vomiting.   Neurological: Negative for loss of balance, numbness and paresthesias.           Objective:       Vitals:    06/22/18 1430   BP: (!) 168/80   Pulse: 64   Weight: 110.7 kg (244 lb)   Height: 5' 8.4" (1.737 m)   PainSc:   5        Physical Exam   Constitutional: She is oriented to person, place, and time. She appears well-developed and well-nourished.   Cardiovascular:   Pulses:       Dorsalis pedis pulses are 2+ on the right side, and 2+ on the left side.        Posterior tibial pulses are 2+ on the right side, and 2+ on the left side.   Musculoskeletal: She exhibits no edema or tenderness.        Right ankle: Normal.        Left ankle: Normal.        Right foot: There is no swelling, no crepitus and no deformity.        Left foot: There is no swelling, no crepitus and no deformity.   Adequate joint range of motion without pain, limitation, nor crepitation Bilateral feet and ankle joints. Muscle strength is 5/5 in all groups bilaterally.         Feet:   Right Foot:   Protective Sensation: 5 sites tested. 5 sites sensed.   Left Foot:   Protective Sensation: 5 sites tested. 5 sites sensed.   Lymphadenopathy:   No palpable lymph nodes   Neurological: She is alert and oriented to person, place, and time. She has normal strength.   Skin: Skin is warm, dry and intact. No rash noted. No erythema. Nails show no clubbing.   Nails 1-5 bilaterally  are normal in " length, thickness, coloration and are non dystrophic.   No open lesions noted to b/L lower extremities.   Webspaces 1-4 b/L clean, dry, and intact.       Psychiatric: She has a normal mood and affect. Her behavior is normal.             Assessment:       Encounter Diagnosis   Name Primary?    Controlled type 2 diabetes mellitus without complication, without long-term current use of insulin Yes         Plan:       Maral was seen today for diabetes mellitus and diabetic foot exam.    Diagnoses and all orders for this visit:    Controlled type 2 diabetes mellitus without complication, without long-term current use of insulin      I counseled the patient on her conditions, their implications and medical management.      - Shoe inspection. Diabetic Foot Education. Patient reminded of the importance of good nutrition and blood sugar control to help prevent podiatric complications of diabetes. Patient instructed on proper foot hygeine. We discussed wearing proper shoe gear, daily foot inspections, never walking without protective shoe gear, caution putting sharp instruments to feet     - Discussed DM foot care:  Wear comfortable, proper fitting shoes. Wash feet daily. Dry well. After drying, apply moisturizer to feet (no lotion to webspaces). Inspect feet daily for skin breaks, blisters, swelling, or redness. Wear cotton socks (preferably white)  Change socks every day. Do NOT walk barefoot. Do NOT use heating pads or warm/hot water soaks     - Discussed importance of daily moisturizer to the feet such as Gold bonds diabetic foot cream    - Patient is low risk for developing lower extremity issues secondary to diabetes    - Advised patient to monitor blood sugar levels. Steroid injections may cause temporary elevations in BS. Patient verbally reports understanding and will closely monitor BS and follow up as needed if any elevated or  uncontrollable BS should occur     - RTC in  1 year for a diabetic foot exam  or sooner  if problems

## 2018-07-10 ENCOUNTER — LAB VISIT (OUTPATIENT)
Dept: LAB | Facility: HOSPITAL | Age: 68
End: 2018-07-10
Attending: INTERNAL MEDICINE
Payer: MEDICARE

## 2018-07-10 DIAGNOSIS — E83.39 HYPOPHOSPHATEMIA: ICD-10-CM

## 2018-07-10 DIAGNOSIS — E21.3 HYPERPARATHYROIDISM: ICD-10-CM

## 2018-07-10 LAB
25(OH)D3+25(OH)D2 SERPL-MCNC: 16 NG/ML
ALBUMIN SERPL BCP-MCNC: 3.5 G/DL
ANION GAP SERPL CALC-SCNC: 7 MMOL/L
BUN SERPL-MCNC: 17 MG/DL
CALCIUM SERPL-MCNC: 10.8 MG/DL
CHLORIDE SERPL-SCNC: 108 MMOL/L
CO2 SERPL-SCNC: 25 MMOL/L
CREAT SERPL-MCNC: 0.9 MG/DL
EST. GFR  (AFRICAN AMERICAN): >60 ML/MIN/1.73 M^2
EST. GFR  (NON AFRICAN AMERICAN): >60 ML/MIN/1.73 M^2
GLUCOSE SERPL-MCNC: 109 MG/DL
PHOSPHATE SERPL-MCNC: 1.5 MG/DL
POTASSIUM SERPL-SCNC: 4 MMOL/L
PTH-INTACT SERPL-MCNC: 692 PG/ML
SODIUM SERPL-SCNC: 140 MMOL/L

## 2018-07-10 PROCEDURE — 36415 COLL VENOUS BLD VENIPUNCTURE: CPT

## 2018-07-10 PROCEDURE — 82306 VITAMIN D 25 HYDROXY: CPT

## 2018-07-10 PROCEDURE — 80069 RENAL FUNCTION PANEL: CPT

## 2018-07-10 PROCEDURE — 83970 ASSAY OF PARATHORMONE: CPT

## 2018-07-16 ENCOUNTER — PATIENT MESSAGE (OUTPATIENT)
Dept: ENDOCRINOLOGY | Facility: CLINIC | Age: 68
End: 2018-07-16

## 2018-07-20 ENCOUNTER — PROCEDURE VISIT (OUTPATIENT)
Dept: DERMATOLOGY | Facility: CLINIC | Age: 68
End: 2018-07-20
Payer: MEDICARE

## 2018-07-20 DIAGNOSIS — L72.0 EPIDERMOID CYST: Primary | ICD-10-CM

## 2018-07-20 PROCEDURE — 11422 EXC H-F-NK-SP B9+MARG 1.1-2: CPT | Mod: S$GLB,,, | Performed by: DERMATOLOGY

## 2018-07-20 PROCEDURE — 11402 EXC TR-EXT B9+MARG 1.1-2 CM: CPT | Mod: 51,S$GLB,, | Performed by: DERMATOLOGY

## 2018-07-20 PROCEDURE — 88304 TISSUE EXAM BY PATHOLOGIST: CPT | Mod: 59 | Performed by: PATHOLOGY

## 2018-07-20 PROCEDURE — 12031 INTMD RPR S/A/T/EXT 2.5 CM/<: CPT | Mod: S$GLB,,, | Performed by: DERMATOLOGY

## 2018-07-20 PROCEDURE — 12041 INTMD RPR N-HF/GENIT 2.5CM/<: CPT | Mod: 59,S$GLB,, | Performed by: DERMATOLOGY

## 2018-07-20 PROCEDURE — 99499 UNLISTED E&M SERVICE: CPT | Mod: S$GLB,,, | Performed by: DERMATOLOGY

## 2018-07-20 NOTE — PROGRESS NOTES
Patient presents for removal of 2 cysts.    Procedure 1    PROCEDURE: Elliptical excision with intermediate layered repair in order to decrease dead space.    ANESTHETIC: 3 cc 1% Xylocaine with Epinephrine 1:100,000, buffered    SURGEON: Swetha Luz M.D.    ASSISTANTS: Acacia Butcher MA    PREOPERATIVE DIAGNOSIS:  cyst    POSTOPERATIVE DIAGNOSIS:  Same as preoperative diagnosis    PATHOLOGIC DIAGNOSIS: Pending    LOCATION: mid upper back    INITIAL LESION SIZE: 0.8 cm    EXCISED DIAMETER: 1.2 cm    PREPARATION: The diagnosis, procedure, alternatives, benefits and risks, including but not limited to: infection, bleeding/bruising, drug reactions, pain, scar or cosmetic defect, local sensation disturbances, wound dehiscence (separation of wound edges after sutures removed) and/or recurrence of present condition were explained to the patient. The patient elected to proceed.  Patient's identity was verified using 2 patient identifiers and the side and site was verified.  Time out period with surgeon, assistant and patient in surgical suite was taken.    PROCEDURE: The location noted above was prepped, draped, and anesthetized in the usual sterile fashion per Edith Loya LPN. Lesional tissue was carefully marked with at least 0 mm margins of clinically normal skin in all directions. A fusiform elliptical excision was done with #15 blade carried down completely through the dermis into the deep subcutaneous tissues to the level of the non-muscle fascia, and dissection was carried out in that plane.  Blood loss was minimal. The wound was then approximated in a layered fashion with subcutaneous and intradermal sutures of 4.0 Vicryl, approximately 2 in number, and the wound was then superficially closed with running sutures of 4.0 Prolene.    The patient tolerated the procedure well.    The area was cleaned and dressed appropriately and the patient was given wound care instructions, as well as an appointment for follow-up  evaluation.    LENGTH OF REPAIR: 1.5 cm      Procedure 2    PROCEDURE: Elliptical excision with intermediate layered repair in order to decrease tension.    ANESTHETIC: 3 cc 1% Xylocaine with Epinephrine 1:100,000, buffered    SURGEON: Swetha Luz M.D.    ASSISTANTS: Acacia Butcher MA    PREOPERATIVE DIAGNOSIS:  cyst    POSTOPERATIVE DIAGNOSIS:  Same as preoperative diagnosis    PATHOLOGIC DIAGNOSIS: Pending    LOCATION: left neck    INITIAL LESION SIZE: 1.3 cm    EXCISED DIAMETER: 1.3 cm    PREPARATION: The diagnosis, procedure, alternatives, benefits and risks, including but not limited to: infection, bleeding/bruising, drug reactions, pain, scar or cosmetic defect, local sensation disturbances, wound dehiscence (separation of wound edges after sutures removed) and/or recurrence of present condition were explained to the patient. The patient elected to proceed.  Patient's identity was verified using 2 patient identifiers and the side and site was verified.  Time out period with surgeon, assistant and patient in surgical suite was taken.    PROCEDURE: The location noted above was prepped, draped, and anesthetized in the usual sterile fashion per Edith Loya LPN. Lesional tissue was carefully marked with at least 0 mm margins of clinically normal skin in all directions. A fusiform elliptical excision was done with #15 blade carried down completely through the dermis into the deep subcutaneous tissues to the level of the non-muscle fascia, and dissection was carried out in that plane. Blood loss was minimal. The wound was then approximated in a layered fashion with subcutaneous and intradermal sutures of 4.0 Vicryl, approximately 2 in number, and the wound was then superficially closed with running sutures of 4.0 Prolene.    The patient tolerated the procedure well.    The area was cleaned and dressed appropriately and the patient was given wound care instructions, as well as an appointment for follow-up  evaluation.    LENGTH OF REPAIR: 1.8 cm      Given history of keloids, advised Scar Away gel to surgical sites after sutures removed.

## 2018-07-26 DIAGNOSIS — I10 ESSENTIAL HYPERTENSION: ICD-10-CM

## 2018-07-26 RX ORDER — POTASSIUM CHLORIDE 1500 MG/1
TABLET, EXTENDED RELEASE ORAL
Qty: 30 TABLET | Refills: 2 | Status: SHIPPED | OUTPATIENT
Start: 2018-07-26 | End: 2018-12-21 | Stop reason: SDUPTHER

## 2018-07-27 RX ORDER — DOXAZOSIN 8 MG/1
TABLET ORAL
Qty: 30 TABLET | Refills: 2 | Status: SHIPPED | OUTPATIENT
Start: 2018-07-27 | End: 2018-12-21 | Stop reason: SDUPTHER

## 2018-08-02 ENCOUNTER — CLINICAL SUPPORT (OUTPATIENT)
Dept: DERMATOLOGY | Facility: CLINIC | Age: 68
End: 2018-08-02
Payer: MEDICARE

## 2018-08-02 DIAGNOSIS — Z48.02 VISIT FOR SUTURE REMOVAL: Primary | ICD-10-CM

## 2018-08-02 PROCEDURE — 99024 POSTOP FOLLOW-UP VISIT: CPT | Mod: S$GLB,,, | Performed by: DERMATOLOGY

## 2018-08-02 PROCEDURE — 99999 PR PBB SHADOW E&M-EST. PATIENT-LVL III: CPT | Mod: PBBFAC,,,

## 2018-08-02 NOTE — PROGRESS NOTES
Suture Removal note:  CC: 68 y.o. female patient is here for suture removal.         HPI: Patient is s/p excision of Rule out cyst x 2 from the left neck and mid upper back on 7/20/2018.  Patient reports no problems.    WOUND PE:  Sutures intact.  Wound healing well.  Good approximation of skin edges.  No signs or symptoms of infection.    IMPRESSION:  FOLLICULAR CYST, INFUNDIBULAR TYPE.    PLAN:  Sutures removed today.  Continue wound care.    RTC: In 1 month if keloid for injections.

## 2018-12-21 DIAGNOSIS — E83.39 HYPOPHOSPHATEMIA: Primary | ICD-10-CM

## 2018-12-21 DIAGNOSIS — I10 ESSENTIAL HYPERTENSION: ICD-10-CM

## 2018-12-21 RX ORDER — POTASSIUM CHLORIDE 1500 MG/1
20 TABLET, EXTENDED RELEASE ORAL DAILY
Qty: 30 TABLET | Refills: 2 | Status: SHIPPED | OUTPATIENT
Start: 2018-12-21 | End: 2019-03-11 | Stop reason: SDUPTHER

## 2018-12-26 DIAGNOSIS — I10 ESSENTIAL HYPERTENSION: ICD-10-CM

## 2018-12-26 RX ORDER — DOXAZOSIN 8 MG/1
TABLET ORAL
Qty: 90 TABLET | Refills: 0 | Status: SHIPPED | OUTPATIENT
Start: 2018-12-26 | End: 2019-01-07 | Stop reason: SDUPTHER

## 2018-12-26 RX ORDER — FELODIPINE 10 MG/1
10 TABLET, EXTENDED RELEASE ORAL DAILY
Qty: 90 TABLET | Refills: 0 | Status: SHIPPED | OUTPATIENT
Start: 2018-12-26 | End: 2019-01-07 | Stop reason: SDUPTHER

## 2018-12-26 RX ORDER — DOXAZOSIN 8 MG/1
8 TABLET ORAL NIGHTLY
Qty: 30 TABLET | Refills: 0 | Status: SHIPPED | OUTPATIENT
Start: 2018-12-26 | End: 2018-12-26 | Stop reason: SDUPTHER

## 2018-12-26 RX ORDER — LOSARTAN POTASSIUM 100 MG/1
100 TABLET ORAL DAILY
Qty: 90 TABLET | Refills: 0 | Status: SHIPPED | OUTPATIENT
Start: 2018-12-26 | End: 2019-01-07 | Stop reason: SDUPTHER

## 2019-01-07 DIAGNOSIS — I10 ESSENTIAL HYPERTENSION: ICD-10-CM

## 2019-01-07 RX ORDER — FELODIPINE 10 MG/1
10 TABLET, EXTENDED RELEASE ORAL DAILY
Qty: 90 TABLET | Refills: 0 | Status: SHIPPED | OUTPATIENT
Start: 2019-01-07 | End: 2019-02-14 | Stop reason: SDUPTHER

## 2019-01-07 RX ORDER — DOXAZOSIN 8 MG/1
8 TABLET ORAL DAILY
Qty: 90 TABLET | Refills: 0 | Status: SHIPPED | OUTPATIENT
Start: 2019-01-07 | End: 2019-02-14 | Stop reason: SDUPTHER

## 2019-01-07 RX ORDER — LOSARTAN POTASSIUM 100 MG/1
100 TABLET ORAL DAILY
Qty: 90 TABLET | Refills: 0 | Status: SHIPPED | OUTPATIENT
Start: 2019-01-07 | End: 2019-02-14 | Stop reason: SDUPTHER

## 2019-01-15 ENCOUNTER — OFFICE VISIT (OUTPATIENT)
Dept: URGENT CARE | Facility: CLINIC | Age: 69
End: 2019-01-15
Payer: MEDICARE

## 2019-01-15 VITALS
OXYGEN SATURATION: 97 % | HEIGHT: 67 IN | WEIGHT: 235 LBS | BODY MASS INDEX: 36.88 KG/M2 | DIASTOLIC BLOOD PRESSURE: 96 MMHG | HEART RATE: 64 BPM | RESPIRATION RATE: 18 BRPM | TEMPERATURE: 98 F | SYSTOLIC BLOOD PRESSURE: 156 MMHG

## 2019-01-15 DIAGNOSIS — J06.9 URI, ACUTE: ICD-10-CM

## 2019-01-15 DIAGNOSIS — R05.9 COUGH IN ADULT: ICD-10-CM

## 2019-01-15 DIAGNOSIS — J30.1 SEASONAL ALLERGIC RHINITIS DUE TO POLLEN: Primary | ICD-10-CM

## 2019-01-15 PROCEDURE — 3080F DIAST BP >= 90 MM HG: CPT | Mod: CPTII,S$GLB,, | Performed by: FAMILY MEDICINE

## 2019-01-15 PROCEDURE — 3080F PR MOST RECENT DIASTOLIC BLOOD PRESSURE >= 90 MM HG: ICD-10-PCS | Mod: CPTII,S$GLB,, | Performed by: FAMILY MEDICINE

## 2019-01-15 PROCEDURE — 3077F SYST BP >= 140 MM HG: CPT | Mod: CPTII,S$GLB,, | Performed by: FAMILY MEDICINE

## 2019-01-15 PROCEDURE — 3288F PR FALLS RISK ASSESSMENT DOCUMENTED: ICD-10-PCS | Mod: CPTII,S$GLB,, | Performed by: FAMILY MEDICINE

## 2019-01-15 PROCEDURE — 1100F PTFALLS ASSESS-DOCD GE2>/YR: CPT | Mod: CPTII,S$GLB,, | Performed by: FAMILY MEDICINE

## 2019-01-15 PROCEDURE — 99213 OFFICE O/P EST LOW 20 MIN: CPT | Mod: S$GLB,,, | Performed by: FAMILY MEDICINE

## 2019-01-15 PROCEDURE — 3288F FALL RISK ASSESSMENT DOCD: CPT | Mod: CPTII,S$GLB,, | Performed by: FAMILY MEDICINE

## 2019-01-15 PROCEDURE — 99213 PR OFFICE/OUTPT VISIT, EST, LEVL III, 20-29 MIN: ICD-10-PCS | Mod: S$GLB,,, | Performed by: FAMILY MEDICINE

## 2019-01-15 PROCEDURE — 1100F PR PT FALLS ASSESS DOC 2+ FALLS/FALL W/INJURY/YR: ICD-10-PCS | Mod: CPTII,S$GLB,, | Performed by: FAMILY MEDICINE

## 2019-01-15 PROCEDURE — 3077F PR MOST RECENT SYSTOLIC BLOOD PRESSURE >= 140 MM HG: ICD-10-PCS | Mod: CPTII,S$GLB,, | Performed by: FAMILY MEDICINE

## 2019-01-15 RX ORDER — AMOXICILLIN 875 MG/1
875 TABLET, FILM COATED ORAL 2 TIMES DAILY
Qty: 20 TABLET | Refills: 0 | Status: SHIPPED | OUTPATIENT
Start: 2019-01-15 | End: 2019-01-25

## 2019-01-15 RX ORDER — PROMETHAZINE HYDROCHLORIDE AND DEXTROMETHORPHAN HYDROBROMIDE 6.25; 15 MG/5ML; MG/5ML
SYRUP ORAL
Qty: 180 ML | Refills: 0 | Status: SHIPPED | OUTPATIENT
Start: 2019-01-15 | End: 2020-10-01

## 2019-01-15 NOTE — PROGRESS NOTES
"Subjective:       Patient ID: Maral Linn is a 68 y.o. female.    Vitals:  height is 5' 7" (1.702 m) and weight is 106.6 kg (235 lb). Her temperature is 98.4 °F (36.9 °C). Her blood pressure is 156/96 (abnormal) and her pulse is 64. Her respiration is 18 and oxygen saturation is 97%.     Chief Complaint: Sinus Problem    C/o sore throat and cough x one week, now with some colored phlegm, no fever      Sinus Problem   This is a new problem. The current episode started 1 to 4 weeks ago (1 Week Ago ). The problem has been gradually worsening since onset. There has been no fever. Her pain is at a severity of 0/10. She is experiencing no pain. Associated symptoms include coughing, sinus pressure, sneezing and a sore throat. Pertinent negatives include no chills, congestion, diaphoresis, ear pain or shortness of breath. Past treatments include nothing. The treatment provided no relief.       Constitution: Negative for chills, sweating, fatigue and fever.   HENT: Positive for postnasal drip, sinus pain, sinus pressure and sore throat. Negative for ear pain, congestion and voice change.    Neck: Negative for painful lymph nodes.   Eyes: Negative for eye redness.   Respiratory: Positive for cough and sputum production. Negative for chest tightness, bloody sputum, COPD, shortness of breath, stridor, wheezing and asthma.    Gastrointestinal: Positive for nausea. Negative for vomiting.   Musculoskeletal: Negative for muscle ache.   Skin: Negative for rash.   Allergic/Immunologic: Positive for sneezing. Negative for seasonal allergies and asthma.   Hematologic/Lymphatic: Negative for swollen lymph nodes.       Objective:      Physical Exam   Constitutional: She is oriented to person, place, and time. She appears well-developed and well-nourished. She is cooperative.  Non-toxic appearance. She does not appear ill. No distress.   HENT:   Head: Normocephalic and atraumatic.   Right Ear: Hearing, tympanic membrane, external ear " and ear canal normal.   Left Ear: Hearing, tympanic membrane, external ear and ear canal normal.   Nose: Nose normal. No mucosal edema, rhinorrhea or nasal deformity. No epistaxis. Right sinus exhibits no maxillary sinus tenderness and no frontal sinus tenderness. Left sinus exhibits no maxillary sinus tenderness and no frontal sinus tenderness.   Mouth/Throat: Uvula is midline, oropharynx is clear and moist and mucous membranes are normal. No trismus in the jaw. Normal dentition. No uvula swelling. No posterior oropharyngeal erythema.   Eyes: Conjunctivae and lids are normal. Right eye exhibits no discharge. Left eye exhibits no discharge. No scleral icterus.   Sclera clear bilat   Neck: Trachea normal, normal range of motion, full passive range of motion without pain and phonation normal. Neck supple. No JVD present. No tracheal deviation present. No thyromegaly present.   Cardiovascular: Normal rate, regular rhythm, normal heart sounds, intact distal pulses and normal pulses. Exam reveals no friction rub.   No murmur heard.  Pulmonary/Chest: Effort normal and breath sounds normal. No stridor. No respiratory distress. She has no wheezes.   Abdominal: Soft. Normal appearance and bowel sounds are normal. She exhibits no distension, no pulsatile midline mass and no mass. There is no tenderness.   Musculoskeletal: Normal range of motion. She exhibits no edema or deformity.   Neurological: She is alert and oriented to person, place, and time. She exhibits normal muscle tone. Coordination normal.   Skin: Skin is warm, dry and intact. She is not diaphoretic. No pallor.   Psychiatric: She has a normal mood and affect. Her speech is normal and behavior is normal. Judgment and thought content normal. Cognition and memory are normal.   Nursing note and vitals reviewed.      Assessment:       1. Seasonal allergic rhinitis due to pollen    2. URI, acute    3. Cough in adult        Plan:         Seasonal allergic rhinitis due  to pollen  -     promethazine-dextromethorphan (PROMETHAZINE-DM) 6.25-15 mg/5 mL Syrp; Take one tsp po q 6 hrs prn cough  Dispense: 180 mL; Refill: 0    URI, acute  -     amoxicillin (AMOXIL) 875 MG tablet; Take 1 tablet (875 mg total) by mouth 2 (two) times daily. for 10 days  Dispense: 20 tablet; Refill: 0    Cough in adult  -     promethazine-dextromethorphan (PROMETHAZINE-DM) 6.25-15 mg/5 mL Syrp; Take one tsp po q 6 hrs prn cough  Dispense: 180 mL; Refill: 0

## 2019-02-14 ENCOUNTER — OFFICE VISIT (OUTPATIENT)
Dept: INTERNAL MEDICINE | Facility: CLINIC | Age: 69
End: 2019-02-14
Payer: MEDICARE

## 2019-02-14 DIAGNOSIS — M54.2 NECK PAIN: ICD-10-CM

## 2019-02-14 DIAGNOSIS — Z12.31 SCREENING MAMMOGRAM, ENCOUNTER FOR: ICD-10-CM

## 2019-02-14 DIAGNOSIS — E11.9 DIABETES MELLITUS WITHOUT COMPLICATION: ICD-10-CM

## 2019-02-14 DIAGNOSIS — E04.1 THYROID NODULE: ICD-10-CM

## 2019-02-14 DIAGNOSIS — I10 ESSENTIAL HYPERTENSION: ICD-10-CM

## 2019-02-14 DIAGNOSIS — E21.3 HYPERPARATHYROIDISM: Primary | ICD-10-CM

## 2019-02-14 PROCEDURE — 99214 PR OFFICE/OUTPT VISIT, EST, LEVL IV, 30-39 MIN: ICD-10-PCS | Mod: S$GLB,,, | Performed by: INTERNAL MEDICINE

## 2019-02-14 PROCEDURE — 99214 OFFICE O/P EST MOD 30 MIN: CPT | Mod: S$GLB,,, | Performed by: INTERNAL MEDICINE

## 2019-02-14 PROCEDURE — 99499 UNLISTED E&M SERVICE: CPT | Mod: S$GLB,,, | Performed by: INTERNAL MEDICINE

## 2019-02-14 PROCEDURE — 3044F PR MOST RECENT HEMOGLOBIN A1C LEVEL <7.0%: ICD-10-PCS | Mod: CPTII,S$GLB,, | Performed by: INTERNAL MEDICINE

## 2019-02-14 PROCEDURE — 3075F SYST BP GE 130 - 139MM HG: CPT | Mod: CPTII,S$GLB,, | Performed by: INTERNAL MEDICINE

## 2019-02-14 PROCEDURE — 99999 PR PBB SHADOW E&M-EST. PATIENT-LVL V: ICD-10-PCS | Mod: PBBFAC,,, | Performed by: INTERNAL MEDICINE

## 2019-02-14 PROCEDURE — 3079F PR MOST RECENT DIASTOLIC BLOOD PRESSURE 80-89 MM HG: ICD-10-PCS | Mod: CPTII,S$GLB,, | Performed by: INTERNAL MEDICINE

## 2019-02-14 PROCEDURE — 1101F PT FALLS ASSESS-DOCD LE1/YR: CPT | Mod: CPTII,S$GLB,, | Performed by: INTERNAL MEDICINE

## 2019-02-14 PROCEDURE — 3044F HG A1C LEVEL LT 7.0%: CPT | Mod: CPTII,S$GLB,, | Performed by: INTERNAL MEDICINE

## 2019-02-14 PROCEDURE — 1101F PR PT FALLS ASSESS DOC 0-1 FALLS W/OUT INJ PAST YR: ICD-10-PCS | Mod: CPTII,S$GLB,, | Performed by: INTERNAL MEDICINE

## 2019-02-14 PROCEDURE — 99999 PR PBB SHADOW E&M-EST. PATIENT-LVL V: CPT | Mod: PBBFAC,,, | Performed by: INTERNAL MEDICINE

## 2019-02-14 PROCEDURE — 3075F PR MOST RECENT SYSTOLIC BLOOD PRESS GE 130-139MM HG: ICD-10-PCS | Mod: CPTII,S$GLB,, | Performed by: INTERNAL MEDICINE

## 2019-02-14 PROCEDURE — 99499 RISK ADDL DX/OHS AUDIT: ICD-10-PCS | Mod: S$GLB,,, | Performed by: INTERNAL MEDICINE

## 2019-02-14 PROCEDURE — 3079F DIAST BP 80-89 MM HG: CPT | Mod: CPTII,S$GLB,, | Performed by: INTERNAL MEDICINE

## 2019-02-14 RX ORDER — METFORMIN HYDROCHLORIDE 500 MG/1
TABLET, EXTENDED RELEASE ORAL
Qty: 180 TABLET | Refills: 1 | Status: SHIPPED | OUTPATIENT
Start: 2019-02-14 | End: 2019-02-22 | Stop reason: SDUPTHER

## 2019-02-14 RX ORDER — DOXAZOSIN 8 MG/1
8 TABLET ORAL DAILY
Qty: 90 TABLET | Refills: 1 | Status: SHIPPED | OUTPATIENT
Start: 2019-02-14 | End: 2019-04-08 | Stop reason: SDUPTHER

## 2019-02-14 RX ORDER — FELODIPINE 10 MG/1
10 TABLET, EXTENDED RELEASE ORAL DAILY
Qty: 90 TABLET | Refills: 1 | Status: SHIPPED | OUTPATIENT
Start: 2019-02-14 | End: 2019-04-08 | Stop reason: SDUPTHER

## 2019-02-14 RX ORDER — LOSARTAN POTASSIUM 100 MG/1
100 TABLET ORAL DAILY
Qty: 90 TABLET | Refills: 1 | Status: SHIPPED | OUTPATIENT
Start: 2019-02-14 | End: 2019-04-08 | Stop reason: SDUPTHER

## 2019-02-14 RX ORDER — METOPROLOL SUCCINATE 50 MG/1
TABLET, EXTENDED RELEASE ORAL
Qty: 90 TABLET | Refills: 1 | Status: SHIPPED | OUTPATIENT
Start: 2019-02-14 | End: 2019-02-22 | Stop reason: SDUPTHER

## 2019-02-17 VITALS
SYSTOLIC BLOOD PRESSURE: 138 MMHG | HEIGHT: 67 IN | OXYGEN SATURATION: 99 % | DIASTOLIC BLOOD PRESSURE: 88 MMHG | HEART RATE: 60 BPM | WEIGHT: 244.69 LBS | BODY MASS INDEX: 38.4 KG/M2

## 2019-02-18 NOTE — PROGRESS NOTES
Subjective:       Patient ID: Maral Linn is a 69 y.o. female.    Chief Complaint: Hypertension    HPI  She returns for management of hypertension.  She has had hypertension for over a year.  Current treatment has included medications outlined in medication list.  She denies chest pain or shortness of breath.  No palpitations.  Denies left arm or neck pain. She has diabetes.  Denies polyuria, polydipsia       PAST MEDICAL HISTORY: Hypertension, carpal tunnel syndrome, diabetes, thyroid nodule,   hypothyroidism, hyperparathyroidism, status post hysterectomy, bladder repair,   cholecystectomy, metatarsal fracture, and liver hemangioma.     She had a colonoscopy July 2012     MEDICATIONS: Plendil 10 mg daily, Cardura 8 mg at bedtime , Cozaar 100 mg daily, metformin 500 mg twice a day, Toprol XL 50 mg daily    ALLERGIES: No known drug allergies.      Review of Systems   Constitutional: Negative for chills, fatigue, fever and unexpected weight change.   Respiratory: Negative for chest tightness and shortness of breath.    Cardiovascular: Negative for chest pain and palpitations.   Gastrointestinal: Negative for abdominal pain and blood in stool.   Neurological: Negative for dizziness, syncope, numbness and headaches.       Objective:      Physical Exam   HENT:   Right Ear: External ear normal.   Left Ear: External ear normal.   Nose: Nose normal.   Mouth/Throat: Oropharynx is clear and moist.   Eyes: Pupils are equal, round, and reactive to light.   Neck: Normal range of motion.   Cardiovascular: Normal rate and regular rhythm.   No murmur heard.  Pulmonary/Chest: Breath sounds normal.   Abdominal: She exhibits no distension. There is no hepatosplenomegaly. There is no tenderness.   Lymphadenopathy:     She has no cervical adenopathy.     She has no axillary adenopathy.   Neurological: She has normal strength and normal reflexes. No cranial nerve deficit or sensory deficit.     breast exam:  No masses palpated, no  nipple discharge expressed  Assessment/Plan       Assessment and plan:  Hypertension:  Check CMP and lipid panel  2.  Diabetes:  Check A1c, TSH, urine microalbumin.  She reports having her eye exam within the past year

## 2019-02-18 NOTE — PROGRESS NOTES
Patient, Maral Linn (MRN #[id number]), presented with a recorded BMI of 38.33 kg/m^2 and a documented comorbidity(s):  - Hypertension  to which the severe obesity is a contributing factor. This is consistent with the definition of severe obesity (BMI 35.0-39.9) with comorbidity (ICD-10 E66.01, Z68.35). The patient's severe obesity was monitored, evaluated, addressed and/or treated. This addendum to the medical record is made on 02/18/2019.

## 2019-02-21 ENCOUNTER — PATIENT MESSAGE (OUTPATIENT)
Dept: ENDOCRINOLOGY | Facility: CLINIC | Age: 69
End: 2019-02-21

## 2019-02-22 ENCOUNTER — HOSPITAL ENCOUNTER (OUTPATIENT)
Dept: RADIOLOGY | Facility: HOSPITAL | Age: 69
Discharge: HOME | End: 2019-02-22
Attending: INTERNAL MEDICINE
Payer: MEDICARE

## 2019-02-22 ENCOUNTER — TELEPHONE (OUTPATIENT)
Dept: ADMINISTRATIVE | Facility: HOSPITAL | Age: 69
End: 2019-02-22

## 2019-02-22 DIAGNOSIS — Z12.31 SCREENING MAMMOGRAM, ENCOUNTER FOR: ICD-10-CM

## 2019-02-22 PROCEDURE — 77067 SCR MAMMO BI INCL CAD: CPT | Mod: 26,,, | Performed by: RADIOLOGY

## 2019-02-22 PROCEDURE — 77067 MAMMO DIGITAL SCREENING BILAT WITH CAD: ICD-10-PCS | Mod: 26,,, | Performed by: RADIOLOGY

## 2019-02-22 PROCEDURE — 77067 SCR MAMMO BI INCL CAD: CPT | Mod: TC

## 2019-02-22 RX ORDER — METFORMIN HYDROCHLORIDE 500 MG/1
TABLET, EXTENDED RELEASE ORAL
Qty: 180 TABLET | Refills: 1 | Status: SHIPPED | OUTPATIENT
Start: 2019-02-22 | End: 2019-02-25 | Stop reason: ALTCHOICE

## 2019-02-22 RX ORDER — METOPROLOL SUCCINATE 50 MG/1
TABLET, EXTENDED RELEASE ORAL
Qty: 90 TABLET | Refills: 1 | Status: SHIPPED | OUTPATIENT
Start: 2019-02-22 | End: 2019-05-28 | Stop reason: SDUPTHER

## 2019-02-22 NOTE — TELEPHONE ENCOUNTER
PER PHN :    Maral Linn MRN [id number]  Patient Metoprolol dose was changed at last office visit [facility] , she is not able to use this pharmacy with [facility] . Please request that her Rx be sent to [facility] [phone]. [facility] remove [facility] from her file. She also needs Metformin to be sent to her [facility] pharmacy       Thanks ,  Tam Phillips RN   [facility] RN Navigator /   [facility]  Phone : [phone]

## 2019-02-23 ENCOUNTER — TELEPHONE (OUTPATIENT)
Dept: INTERNAL MEDICINE | Facility: CLINIC | Age: 69
End: 2019-02-23

## 2019-02-23 NOTE — TELEPHONE ENCOUNTER
Please contact patient and inform her A1c is elevated.  I will need to increase the dose of her metformin.  Please ask her what pharmacy she would like that sent to

## 2019-02-25 RX ORDER — METFORMIN HYDROCHLORIDE 1000 MG/1
1000 TABLET ORAL 2 TIMES DAILY WITH MEALS
Qty: 180 TABLET | Refills: 1 | Status: SHIPPED | OUTPATIENT
Start: 2019-02-25 | End: 2019-05-28 | Stop reason: SDUPTHER

## 2019-02-25 NOTE — TELEPHONE ENCOUNTER
Pt informed A1c is elevated and Dr. Pearson needs to increase her metformin. Pt verbally understood. Please send rx to [facility] in [place].

## 2019-03-11 DIAGNOSIS — E83.39 HYPOPHOSPHATEMIA: ICD-10-CM

## 2019-03-11 RX ORDER — POTASSIUM CHLORIDE 1500 MG/1
20 TABLET, EXTENDED RELEASE ORAL DAILY
Qty: 30 TABLET | Refills: 5 | Status: SHIPPED | OUTPATIENT
Start: 2019-03-11 | End: 2020-03-23 | Stop reason: SDUPTHER

## 2019-03-13 ENCOUNTER — CLINICAL SUPPORT (OUTPATIENT)
Dept: REHABILITATION | Facility: HOSPITAL | Age: 69
End: 2019-03-13
Attending: INTERNAL MEDICINE
Payer: MEDICARE

## 2019-03-13 DIAGNOSIS — M54.2 CHRONIC NECK PAIN: ICD-10-CM

## 2019-03-13 DIAGNOSIS — G89.29 CHRONIC NECK PAIN: ICD-10-CM

## 2019-03-13 PROCEDURE — 97161 PT EVAL LOW COMPLEX 20 MIN: CPT

## 2019-03-13 PROCEDURE — G8978 MOBILITY CURRENT STATUS: HCPCS | Mod: CK

## 2019-03-13 PROCEDURE — G8979 MOBILITY GOAL STATUS: HCPCS | Mod: CK

## 2019-03-13 PROCEDURE — 97110 THERAPEUTIC EXERCISES: CPT

## 2019-03-13 NOTE — PROGRESS NOTES
Physical Therapy Initial Evaluation     Name: Maral LEOS [facility] Number: [id number]    Diagnosis:   Encounter Diagnosis   Name Primary?    Chronic neck pain      Physician: Rosemary Pearson MD  Treatment Orders: PT Eval and Treat  Past Medical History:   Diagnosis Date    Allergy     seasonal    Arthritis     Cataract     Hyperlipidemia 11/20/2014    Hyperparathyroidism 11/20/2014    Hypertension     Hypertension 11/20/2014    Joint pain     Keloid cicatrix     Obesity (BMI 30-39.9) 11/20/2014    Thyroid disease     Type II or unspecified type diabetes mellitus without mention of complication, uncontrolled 11/20/2014    Vitamin D deficiency 11/20/2014     Current Outpatient Medications   Medication Sig    blood sugar diagnostic Strp 1 each by Misc.(Non-Drug; Combo Route) route once daily.    blood-glucose meter kit Use as instructed    cholecalciferol, vitamin D3, 1,000 unit capsule Take 1 capsule (1,000 Units total) by mouth once daily.    doxazosin (CARDURA) 8 MG Tab Take 1 tablet (8 mg total) by mouth once daily.    felodipine (PLENDIL) 10 MG 24 hr tablet Take 1 tablet (10 mg total) by mouth once daily.    fluticasone (FLONASE) 50 mcg/actuation nasal spray 2 Spray, Suspension Nasal Every day    lancets 33 gauge Misc 1 lancet by Misc.(Non-Drug; Combo Route) route once daily.    losartan (COZAAR) 100 MG tablet Take 1 tablet (100 mg total) by mouth once daily.    metFORMIN (GLUCOPHAGE) 1000 MG tablet Take 1 tablet (1,000 mg total) by mouth 2 (two) times daily with meals.    metoprolol succinate (TOPROL-XL) 50 MG 24 hr tablet TAKE 1 TABLET(50 MG) BY MOUTH EVERY DAY    mupirocin (BACTROBAN) 2 % ointment Apply to affected area 3 times daily    PNEUMOVAX 23 25 mcg/0.5 mL     potassium chloride (K-TAB) 20 mEq Take 1 tablet (20 mEq total) by mouth once daily.    potassium phosphate, monobasic, (K-PHOS) 500 mg TbSO Take 1 tablet (500 mg  "total) by mouth once daily.    promethazine-dextromethorphan (PROMETHAZINE-DM) 6.25-15 mg/5 mL Syrp Take one tsp po q 6 hrs prn cough     No current facility-administered medications for this visit.      Review of patient's allergies indicates:  No Known Allergies    Time In: 9:15  Time Out: 10:00    Evaluation Date: 3/13/19  Visit # authorized: 1/1  Authorization period: 2/14/20  Plan of care Expiration: 4/24/19  MD referral: neck pain    Subjective     Patient reports neck pain that began about a year ago. Condition is worsening. Pain described as dull and achey. Pain worsens as day goes on.  Pt sleeps on side with 2 pillows. R side slightly worse than L. Pain is there during household chores but doesn't stop her. Pt reports she does some stretches with some relief. Neck feels like "wood" and is "ridgid."  Doesn't attribute pain to sitting/standing too long.   Radicular symptoms:  denies  Diagnostic Imaging: denies  Pain Scale: Katelynnflaco rates pain on a scale of 0-10 to be 6 currently ; 10 at worst; 2 at best .  Onset: gradual  Aggravating factors:   How pt sleeps, looking down or up, driving.   Easing factors:  Aleve, william arthritis med, TENS unit, heat  Prior Therapy: PT for hip and back with relief and foot.   Functional Deficits Leading to Referral: pain and limitations with functional mobility   Prior functional status: independent  Social: lives alone   Occupation:  Retired clerical office work                     Pts goals:  Get relief from pain     Vertebral artery symptoms: denies     Red flags:  Pt. denies bowel/bladder symptoms. Recent weight loss? denies Constant/Night pain that is unchanging with change of position? denies PMH of CA? Denies       Objective     Posture Alignment: slouched posture, FHP, scapular protraction B    Palpation: TTP UT, LS, cervical paraspinals     Cervical Spine Special Tests:  Vertebral Artery Test: NT  Compression: +  Distraction: +  Spurling's A: + R  Deep Cervical " "Flexors: weak    CERVICAL SPINE AROM (degrees):   Flexion: 30*   Extension: 40   Left Sidebend: 37*L   Right Sidebend: 35 *L   Left Rotation: 45*   Right Rotation: 45       UPPER EXTREMITY STRENGTH:   Left Right   Shoulder Flexion 4/5 4/5   Shoulder Abduction 4/5 4/5     Shoulder Internal Rotation 4+/5 4+/5   Shoulder External Rotation 4+/5 4+/5   Elbow Flexion 5/5 5/5   Elbow Extension 5/5 5/5   Wrist Flexion 5/5 5/5   Wrist Extension 5/5 5/5     Dermatomes: Sensation: Light Touch: Intact    Myotomes: intact    Reflexes: NT    FLEXIBILITY:decreased L UT, LS    SEGMENTAL MOBILITY: tenderness at L 3,4 with sideglides, decreased cervical mobility throughout     Pt/family was provided educational information, including: role of PT, goals for PT, scheduling - pt verbalized understanding. Discussed insurance limitations with pt.     TREATMENT     PT Evaluation Completed? Yes  Discussed Plan of Care with patient: Yes    Maral received 10 minutes of therapeutic exercise including:  Chin tucks   scap squeeze  cervical isometrics 5"  UT stretch  LS stretch   Thoracic extension over chair  Rot snag    Maral received 3 minutes of manual therapy including:  Multifidus isometric    Written Home Exercises Provided: at eval (see handout)   Maral demo good understanding of the education provided. Patient demo good return demo of skill of exercises.    Assessment     Patient is a 69 y.o. female referred to outpatient physical therapy who presents with a PT diagnosis of chronic neck pain demonstrating joint dysfunction, UE weakness, postural impairment, impaired motor control, and functional limitation as described below. Level of complexity is low;  based on patient's past medical history including the below co-morbidities and personal factors; functional limitations, and clinical presentation directly impacting his/her plan of care. Pt demonstrates good rehab potential. Pt will benefit from physcial therapy services in order to " maximize pain free functional mobility. The following goals were discussed with the patient and patient is in agreement with them as to be addressed in the treatment plan. Pt was given a HEP consisting of UT/LS stretch, rot snag, rot isometric, chin tucks. Pt verbally understood the instructions as they were given and demonstrated proper form and technique during therapy. Pt was advised to perform these exercises free of pain, and to stop performing them if pain occurs.         History  Co-morbidities and personal factors that may impact the plan of care Examination  Body Structures and Functions, activity limitations and participation restrictions that may impact the plan of care Clinical Presentation   Decision Making/ Complexity Score   Co-morbidities:   HTN, high BMI, chronic LBP, hyperparathyroidism, DM type II, thyroid nodule      Personal Factors:   age  lifestyle Body Regions: neck    Body Systems: ROM  strength  transitions  motor control      Activity limitations: lifting and carrying objects  driving (bike, car, motorcycle)      Participation Restrictions: ADLs, driving       stable and uncomplicated   low       Medical necessity is demonstrated by the following IMPAIRMENTS/PROBLEMS:  1) Pain limiting function   2) Postural dysfunction   3) Longus colli/suboccipital tightness   4) Upper trapezius/levator scapula tightness   5) Longus colli/scapula stabilizer weakness    6) Decreased cervical/shoulder ROM    7) Decreased cervical and thoracic joint mobility   8) Decreased UT/levator scapula/scalenes soft tissue extensibility   9) Lack of HEP   10) UE paresthesia     Pt's spiritual, cultural and educational needs considered and pt agreeable to plan of care and goals as stated below:     Anticipated Barriers for physical therapy: none    Short Term GOALS: 3 weeks. Pt agrees with goals set.  1. Patient demonstrates independence with HEP.   2. Patient demonstrates independence with Postural Awareness.   3.  Patient demonstrates independence with body mechanics.     Long Term GOALS: 6 weeks. Pt agrees with goals set.  1. Patient demonstrates increased cervical ROM by 10 deg to improve tolerance to functional activities.   2. Patient demonstrates increased strength BUE's by 1 MMT grade or greater to improve tolerance to functional activities.   3. Patient demonstrates improved overall function per FOTO Neck Survey to 44% or less.   4. Pt will be able to look to turn while driving with <2/10 pain.     Functional Limitations Reports - G Codes  Category: Mobility  Tool: FOTO Neck Survey  Score: 59% Limitation   TEST SCORE  Modifier  Impairment Limitation Restriction   0  CH  0 % impaired, limited or restricted   1-9  CI  @ least 1% but less than 20% impaired, limited or restricted   10- 19  CJ  @ least 20%<40% impaired, limited or restricted   20- 29  CK  @ least 40%<60% impaired, limited or restricted   30- 39  CL  @ least 60% <80% impaired, limited or restricted   40- 49  CM  @ least 80%<100% impaired limited or restricted   50/50  CN  100% impaired, limited or restricted     Current/  CK = 59% Limitation  Goal/ : CK= 44% Limitation    PLAN   Pt will be treated by physical therapy 1-3 times a week for 6 weeks for pt. education, HEP, therapeutic exercises, neuromuscular re-education, joint/soft tissue mobilizations, and modalities prn to achieve established goals. Maral may at times be seen by a PTA as part of the Rehab Team.     Therapist: Betty York, PT, DPT  3/13/2019     I certify the need for these services furnished under this plan of treatment and while under my care.  ______________________________ Physician/Referring Practitioner  Date of Signature

## 2019-03-13 NOTE — PLAN OF CARE
Physical Therapy Initial Evaluation     Name: Maral LEOS [facility] Number: [id number]    Diagnosis:   Encounter Diagnosis   Name Primary?    Chronic neck pain      Physician: Rosemary Pearson MD  Treatment Orders: PT Eval and Treat  Past Medical History:   Diagnosis Date    Allergy     seasonal    Arthritis     Cataract     Hyperlipidemia 11/20/2014    Hyperparathyroidism 11/20/2014    Hypertension     Hypertension 11/20/2014    Joint pain     Keloid cicatrix     Obesity (BMI 30-39.9) 11/20/2014    Thyroid disease     Type II or unspecified type diabetes mellitus without mention of complication, uncontrolled 11/20/2014    Vitamin D deficiency 11/20/2014     Current Outpatient Medications   Medication Sig    blood sugar diagnostic Strp 1 each by Misc.(Non-Drug; Combo Route) route once daily.    blood-glucose meter kit Use as instructed    cholecalciferol, vitamin D3, 1,000 unit capsule Take 1 capsule (1,000 Units total) by mouth once daily.    doxazosin (CARDURA) 8 MG Tab Take 1 tablet (8 mg total) by mouth once daily.    felodipine (PLENDIL) 10 MG 24 hr tablet Take 1 tablet (10 mg total) by mouth once daily.    fluticasone (FLONASE) 50 mcg/actuation nasal spray 2 Spray, Suspension Nasal Every day    lancets 33 gauge Misc 1 lancet by Misc.(Non-Drug; Combo Route) route once daily.    losartan (COZAAR) 100 MG tablet Take 1 tablet (100 mg total) by mouth once daily.    metFORMIN (GLUCOPHAGE) 1000 MG tablet Take 1 tablet (1,000 mg total) by mouth 2 (two) times daily with meals.    metoprolol succinate (TOPROL-XL) 50 MG 24 hr tablet TAKE 1 TABLET(50 MG) BY MOUTH EVERY DAY    mupirocin (BACTROBAN) 2 % ointment Apply to affected area 3 times daily    PNEUMOVAX 23 25 mcg/0.5 mL     potassium chloride (K-TAB) 20 mEq Take 1 tablet (20 mEq total) by mouth once daily.    potassium phosphate, monobasic, (K-PHOS) 500 mg TbSO Take 1 tablet (500 mg  "total) by mouth once daily.    promethazine-dextromethorphan (PROMETHAZINE-DM) 6.25-15 mg/5 mL Syrp Take one tsp po q 6 hrs prn cough     No current facility-administered medications for this visit.      Review of patient's allergies indicates:  No Known Allergies    Time In: 9:15  Time Out: 10:00    Evaluation Date: 3/13/19  Visit # authorized: 1/1  Authorization period: 2/14/20  Plan of care Expiration: 4/24/19  MD referral: neck pain    Subjective     Patient reports neck pain that began about a year ago. Condition is worsening. Pain described as dull and achey. Pain worsens as day goes on.  Pt sleeps on side with 2 pillows. R side slightly worse than L. Pain is there during household chores but doesn't stop her. Pt reports she does some stretches with some relief. Neck feels like "wood" and is "ridgid."  Doesn't attribute pain to sitting/standing too long.   Radicular symptoms:  denies  Diagnostic Imaging: denies  Pain Scale: Katelynnflaco rates pain on a scale of 0-10 to be 6 currently ; 10 at worst; 2 at best .  Onset: gradual  Aggravating factors:   How pt sleeps, looking down or up, driving.   Easing factors:  Aleve, william arthritis med, TENS unit, heat  Prior Therapy: PT for hip and back with relief and foot.   Functional Deficits Leading to Referral: pain and limitations with functional mobility   Prior functional status: independent  Social: lives alone   Occupation:  Retired clerical office work                     Pts goals:  Get relief from pain     Vertebral artery symptoms: denies     Red flags:  Pt. denies bowel/bladder symptoms. Recent weight loss? denies Constant/Night pain that is unchanging with change of position? denies PMH of CA? Denies       Objective     Posture Alignment: slouched posture, FHP, scapular protraction B    Palpation: TTP UT, LS, cervical paraspinals     Cervical Spine Special Tests:  Vertebral Artery Test: NT  Compression: +  Distraction: +  Spurling's A: + R  Deep Cervical " "Flexors: weak    CERVICAL SPINE AROM (degrees):   Flexion: 30*   Extension: 40   Left Sidebend: 37*L   Right Sidebend: 35 *L   Left Rotation: 45*   Right Rotation: 45       UPPER EXTREMITY STRENGTH:   Left Right   Shoulder Flexion 4/5 4/5   Shoulder Abduction 4/5 4/5     Shoulder Internal Rotation 4+/5 4+/5   Shoulder External Rotation 4+/5 4+/5   Elbow Flexion 5/5 5/5   Elbow Extension 5/5 5/5   Wrist Flexion 5/5 5/5   Wrist Extension 5/5 5/5     Dermatomes: Sensation: Light Touch: Intact    Myotomes: intact    Reflexes: NT    FLEXIBILITY:decreased L UT, LS    SEGMENTAL MOBILITY: tenderness at L 3,4 with sideglides, decreased cervical mobility throughout     Pt/family was provided educational information, including: role of PT, goals for PT, scheduling - pt verbalized understanding. Discussed insurance limitations with pt.     TREATMENT     PT Evaluation Completed? Yes  Discussed Plan of Care with patient: Yes    Maral received 10 minutes of therapeutic exercise including:  Chin tucks   scap squeeze  cervical isometrics 5"  UT stretch  LS stretch   Thoracic extension over chair  Rot snag    Maral received 3 minutes of manual therapy including:  Multifidus isometric    Written Home Exercises Provided: at eval (see handout)   Maral demo good understanding of the education provided. Patient demo good return demo of skill of exercises.    Assessment     Patient is a 69 y.o. female referred to outpatient physical therapy who presents with a PT diagnosis of chronic neck pain demonstrating joint dysfunction, UE weakness, postural impairment, impaired motor control, and functional limitation as described below. Level of complexity is low;  based on patient's past medical history including the below co-morbidities and personal factors; functional limitations, and clinical presentation directly impacting his/her plan of care. Pt demonstrates good rehab potential. Pt will benefit from physcial therapy services in order to " maximize pain free functional mobility. The following goals were discussed with the patient and patient is in agreement with them as to be addressed in the treatment plan. Pt was given a HEP consisting of UT/LS stretch, rot snag, rot isometric, chin tucks. Pt verbally understood the instructions as they were given and demonstrated proper form and technique during therapy. Pt was advised to perform these exercises free of pain, and to stop performing them if pain occurs.         History  Co-morbidities and personal factors that may impact the plan of care Examination  Body Structures and Functions, activity limitations and participation restrictions that may impact the plan of care Clinical Presentation   Decision Making/ Complexity Score   Co-morbidities:   HTN, high BMI, chronic LBP, hyperparathyroidism, DM type II, thyroid nodule      Personal Factors:   age  lifestyle Body Regions: neck    Body Systems: ROM  strength  transitions  motor control      Activity limitations: lifting and carrying objects  driving (bike, car, motorcycle)      Participation Restrictions: ADLs, driving       stable and uncomplicated   low       Medical necessity is demonstrated by the following IMPAIRMENTS/PROBLEMS:  1) Pain limiting function   2) Postural dysfunction   3) Longus colli/suboccipital tightness   4) Upper trapezius/levator scapula tightness   5) Longus colli/scapula stabilizer weakness    6) Decreased cervical/shoulder ROM    7) Decreased cervical and thoracic joint mobility   8) Decreased UT/levator scapula/scalenes soft tissue extensibility   9) Lack of HEP   10) UE paresthesia     Pt's spiritual, cultural and educational needs considered and pt agreeable to plan of care and goals as stated below:     Anticipated Barriers for physical therapy: none    Short Term GOALS: 3 weeks. Pt agrees with goals set.  1. Patient demonstrates independence with HEP.   2. Patient demonstrates independence with Postural Awareness.   3.  Patient demonstrates independence with body mechanics.     Long Term GOALS: 6 weeks. Pt agrees with goals set.  1. Patient demonstrates increased cervical ROM by 10 deg to improve tolerance to functional activities.   2. Patient demonstrates increased strength BUE's by 1 MMT grade or greater to improve tolerance to functional activities.   3. Patient demonstrates improved overall function per FOTO Neck Survey to 44% or less.   4. Pt will be able to look to turn while driving with <2/10 pain.     Functional Limitations Reports - G Codes  Category: Mobility  Tool: FOTO Neck Survey  Score: 59% Limitation   TEST SCORE  Modifier  Impairment Limitation Restriction   0  CH  0 % impaired, limited or restricted   1-9  CI  @ least 1% but less than 20% impaired, limited or restricted   10- 19  CJ  @ least 20%<40% impaired, limited or restricted   20- 29  CK  @ least 40%<60% impaired, limited or restricted   30- 39  CL  @ least 60% <80% impaired, limited or restricted   40- 49  CM  @ least 80%<100% impaired limited or restricted   50/50  CN  100% impaired, limited or restricted     Current/  CK = 59% Limitation  Goal/ : CK= 44% Limitation    PLAN   Pt will be treated by physical therapy 1-3 times a week for 6 weeks for pt. education, HEP, therapeutic exercises, neuromuscular re-education, joint/soft tissue mobilizations, and modalities prn to achieve established goals. Maral may at times be seen by a PTA as part of the Rehab Team.     Therapist: Betty York, PT, DPT  3/13/2019     I certify the need for these services furnished under this plan of treatment and while under my care.  ______________________________ Physician/Referring Practitioner  Date of Signature

## 2019-04-08 DIAGNOSIS — I10 ESSENTIAL HYPERTENSION: ICD-10-CM

## 2019-04-09 RX ORDER — FELODIPINE 10 MG/1
10 TABLET, EXTENDED RELEASE ORAL DAILY
Qty: 90 TABLET | Refills: 0 | Status: SHIPPED | OUTPATIENT
Start: 2019-04-09 | End: 2019-05-28 | Stop reason: SDUPTHER

## 2019-04-09 RX ORDER — DOXAZOSIN 8 MG/1
8 TABLET ORAL DAILY
Qty: 90 TABLET | Refills: 0 | Status: SHIPPED | OUTPATIENT
Start: 2019-04-09 | End: 2019-05-28 | Stop reason: SDUPTHER

## 2019-04-09 RX ORDER — LOSARTAN POTASSIUM 100 MG/1
100 TABLET ORAL DAILY
Qty: 90 TABLET | Refills: 0 | Status: SHIPPED | OUTPATIENT
Start: 2019-04-09 | End: 2019-05-15 | Stop reason: SDUPTHER

## 2019-04-10 ENCOUNTER — CLINICAL SUPPORT (OUTPATIENT)
Dept: REHABILITATION | Facility: HOSPITAL | Age: 69
End: 2019-04-10
Attending: INTERNAL MEDICINE
Payer: MEDICARE

## 2019-04-10 DIAGNOSIS — G89.29 CHRONIC NECK PAIN: ICD-10-CM

## 2019-04-10 DIAGNOSIS — M54.2 CHRONIC NECK PAIN: ICD-10-CM

## 2019-04-10 PROCEDURE — 97110 THERAPEUTIC EXERCISES: CPT

## 2019-04-10 NOTE — PROGRESS NOTES
"                                                    Physical Therapy Daily Note     Name: Maral LEOS [facility] Number: [id number]  Diagnosis:   Encounter Diagnosis   Name Primary?    Chronic neck pain      Physician: Rosemary Pearson MD  Precautions: standard  Visit #: 2 of 12  PTA Visit #: 1  Time In: 10:42  Time Out: 11:20  Total Treatment Time 1:1: 38    Evaluation Date: 3/13/19  Authorization period: 2/14/20  Plan of care Expiration: 4/24/19  MD referral: neck pain        Subjective     Pt reports: neck feeling better  Pain Scale: Maral rates pain at neck on a scale of 0-10 to be 0 currently.    Objective     Maral received individual therapeutic exercises to develop strength, endurance, ROM, flexibility, posture and core stabilization for 38 minutes including:  UBE 2/2  Rows OTB 2x10  Shoulder ext OTB 2x10  B shoulder ER OTB 2x10  Snag Rot 20x  Seated UT S 3x30"  Seated LS S 3x30"  Seated T/S rot w/ pillow 20x    Chin tucks 20x5" hold  SUpine serratus punch 2x10  Supine scapiton 2x10  Supine HZ abd OTB 2x10    HEP: as per eval    Education provided re: being compliant w/ therapy. LNANY Alicia verbalized good understanding of education provided.   No spiritual or educational barriers to learning provided    Assessment     Patient tolerated sunil rell well w/o adverse reaction. Pt has not been to therapy in 4 wks 2* being busy. ED pt on importance of making consecutive appts. Will sched for next 2 weeks and then POC will need to be updated.   This is a 69 y.o. female referred to outpatient physical therapy and presents with a medical diagnosis of C/S pain and demonstrates limitations as described in the problem list. Pt prognosis is Good. Pt will continue to benefit from skilled outpatient physical therapy to address the deficits listed in the problem list, provide pt/family education and to maximize pt's level of independence in the home and community environment.     Goals as follows:  Short Term GOALS: 3 " weeks. Pt agrees with goals set.  1. Patient demonstrates independence with HEP.   2. Patient demonstrates independence with Postural Awareness.   3. Patient demonstrates independence with body mechanics.      Long Term GOALS: 6 weeks. Pt agrees with goals set.  1. Patient demonstrates increased cervical ROM by 10 deg to improve tolerance to functional activities.   2. Patient demonstrates increased strength BUE's by 1 MMT grade or greater to improve tolerance to functional activities.   3. Patient demonstrates improved overall function per FOTO Neck Survey to 44% or less.   4. Pt will be able to look to turn while driving with <2/10 pain       Plan     Continue with established Plan of Care towards PT goals.    Therapist: Amara Morales, PTA  4/10/2019

## 2019-05-08 ENCOUNTER — CLINICAL SUPPORT (OUTPATIENT)
Dept: REHABILITATION | Facility: HOSPITAL | Age: 69
End: 2019-05-08
Attending: INTERNAL MEDICINE
Payer: MEDICARE

## 2019-05-08 DIAGNOSIS — G89.29 CHRONIC NECK PAIN: Primary | ICD-10-CM

## 2019-05-08 DIAGNOSIS — M54.2 CHRONIC NECK PAIN: Primary | ICD-10-CM

## 2019-05-08 PROCEDURE — 97110 THERAPEUTIC EXERCISES: CPT

## 2019-05-08 PROCEDURE — 97140 MANUAL THERAPY 1/> REGIONS: CPT

## 2019-05-08 NOTE — PROGRESS NOTES
Physical Therapy Daily Note/prgress note         Name: Maral LEOS [facility] Number: [id number]  Diagnosis:   Encounter Diagnosis   Name Primary?    Chronic neck pain Yes     Physician: Rosemary Pearson MD  Treatment Orders: PT Eval and Treat  Past Medical History:   Diagnosis Date    Allergy     seasonal    Arthritis     Cataract     Hyperlipidemia 11/20/2014    Hyperparathyroidism 11/20/2014    Hypertension     Hypertension 11/20/2014    Joint pain     Keloid cicatrix     Obesity (BMI 30-39.9) 11/20/2014    Thyroid disease     Type II or unspecified type diabetes mellitus without mention of complication, uncontrolled 11/20/2014    Vitamin D deficiency 11/20/2014     Current Outpatient Medications   Medication Sig    blood sugar diagnostic Strp 1 each by Misc.(Non-Drug; Combo Route) route once daily.    blood-glucose meter kit Use as instructed    cholecalciferol, vitamin D3, 1,000 unit capsule Take 1 capsule (1,000 Units total) by mouth once daily.    doxazosin (CARDURA) 8 MG Tab Take 1 tablet (8 mg total) by mouth once daily.    felodipine (PLENDIL) 10 MG 24 hr tablet Take 1 tablet (10 mg total) by mouth once daily.    fluticasone (FLONASE) 50 mcg/actuation nasal spray 2 Spray, Suspension Nasal Every day    lancets 33 gauge Misc 1 lancet by Misc.(Non-Drug; Combo Route) route once daily.    losartan (COZAAR) 100 MG tablet Take 1 tablet (100 mg total) by mouth once daily.    metFORMIN (GLUCOPHAGE) 1000 MG tablet Take 1 tablet (1,000 mg total) by mouth 2 (two) times daily with meals.    metoprolol succinate (TOPROL-XL) 50 MG 24 hr tablet TAKE 1 TABLET(50 MG) BY MOUTH EVERY DAY    mupirocin (BACTROBAN) 2 % ointment Apply to affected area 3 times daily    PNEUMOVAX 23 25 mcg/0.5 mL     potassium chloride (K-TAB) 20 mEq Take 1 tablet (20 mEq total) by mouth once daily.    potassium phosphate, monobasic, (K-PHOS) 500 mg TbSO Take 1 tablet  "(500 mg total) by mouth once daily.    promethazine-dextromethorphan (PROMETHAZINE-DM) 6.25-15 mg/5 mL Syrp Take one tsp po q 6 hrs prn cough     No current facility-administered medications for this visit.      Review of patient's allergies indicates:  No Known Allergies    Precautions: standard  Visit #: 3 of 12  PTA Visit #: 1  Time In: 10:15  Time Out: 11:05  Total Treatment Time 1:1: 50     Evaluation Date: 3/13/19  Authorization period: 2/14/20  Plan of care Expiration: 6/12/19  MD referral: neck pain      Subjective     Pt reports her neck is feeling better. She has some other aches and pains from [employer] [place] this weekend. Pt has been   Pain Scale: Maral rates pain at  neck on a scale of 0-10 to be 2 currently.    Objective      Palpation: TTP L C3-4     Cervical Spine Special Tests:  Vertebral Artery Test: NT  Compression: -  Distraction: +  Spurling's A: -  Deep Cervical Flexors: weak     CERVICAL SPINE AROM (degrees):   Flexion: 45   Extension: 45   Left Sidebend: 37*L   Right Sidebend: 40   Left Rotation: 50   Right Rotation: 50         UPPER EXTREMITY STRENGTH:    Left Right   Shoulder Flexion 4+/5 4+/5   Shoulder Abduction 4+/5 4+/5      Shoulder Internal Rotation 5/5 5/5   Shoulder External Rotation 4+/5 4+/5   Elbow Flexion 5/5 5/5   Elbow Extension 5/5 5/5   Wrist Flexion 5/5 5/5   Wrist Extension 5/5 5/5     FLEXIBILITY:decreased L UT, LS     SEGMENTAL MOBILITY: tenderness at L C 3,4 with sideglides, decreased cervical mobility throughout     Maral received individual therapeutic exercises to develop strength, endurance, ROM, flexibility, posture and core stabilization for 42 minutes including:  UBE 6 min  Rows GTB 3x10  Shoulder ext GTB 3x10  B shoulder ER OTB 2x10  Snag Rot 20x  Seated UT S 3x30"  Seated LS S 3x30"  Seated T/S rot w/ pillow 20x  [place] and arrows     Chin tucks 20x5" hold  SUpine serratus punch 2x10  Supine scapiton 2x10  Supine HZ abd OTB 2x10        HEP: as per osmin Alicia " received the following manual therapy techniques: Joint mobilizations were applied for 8 minutes including:  Cervical upglides     Written Home Exercises Provided: per osmin  Pt demo good understanding of the education provided. Maral demonstrated good return demonstration of activities.     Education provided re: being compliant w/ therapy. LANNY Alicia verbalized good understanding of education provided.   No spiritual or educational barriers to learning provided    Assessment     Patient tolerated treatment well without adverse reaction.  Pt feels she is making good progress in PT, however has only been seen for 3 sessions in 6 week period 2/2 schedule availability. Pt has been compliant with exercises. Pt still has pain sleeping on R side at night and maintaining positions for 30-45 min.Pt may be interested in initiating water aerobics as part of therapy or independently in addition to PT. Pt demos good improvements in strength and ROM, continuing to work on posture and cervical mobility. Pt making good progress towards goals. This is a 69 y.o. female referred to outpatient physical therapy and presents with a medical diagnosis of  C/S pain and demonstrates limitations as described in the problem list. Pt prognosis is Good. Pt will continue to benefit from skilled outpatient physical therapy 1-2x/week for 5 weeks to address the deficits listed in the problem list, provide pt/family education and to maximize pt's level of independence in the home and community environment.     Goals as follows:  Short Term GOALS: 3 weeks. Pt agrees with goals set.  1. Patient demonstrates independence with HEP. -met  2. Patient demonstrates independence with Postural Awareness. - progressing  3. Patient demonstrates independence with body mechanics. progressing     Long Term GOALS: 6 weeks. Pt agrees with goals set.  1. Patient demonstrates increased cervical ROM by 10 deg to improve tolerance to functional activities. met  2.  Patient demonstrates increased strength BUE's by 1 MMT grade or greater to improve tolerance to functional activities. Met all but shoulder ER  3. Patient demonstrates improved overall function per FOTO Neck Survey to 44% or less.   4. Pt will be able to look to turn while driving with <2/10 pain-met     Updated goals:   -Cont with STG 2,3,   -Improve shoulder ER by 1 mmt grade  to improve tolerance to functional activities.    Plan     Continue with established Plan of Care towards PT goals 1-2x/week for 5 weeks.    Therapist: Betty York, PT, DPT  5/8/2019

## 2019-05-14 ENCOUNTER — PATIENT MESSAGE (OUTPATIENT)
Dept: INTERNAL MEDICINE | Facility: CLINIC | Age: 69
End: 2019-05-14

## 2019-05-14 DIAGNOSIS — I10 ESSENTIAL HYPERTENSION: ICD-10-CM

## 2019-05-15 RX ORDER — LOSARTAN POTASSIUM 100 MG/1
100 TABLET ORAL DAILY
Qty: 90 TABLET | Refills: 0 | Status: SHIPPED | OUTPATIENT
Start: 2019-05-15 | End: 2019-05-28 | Stop reason: ALTCHOICE

## 2019-05-24 DIAGNOSIS — E11.9 TYPE 2 DIABETES MELLITUS WITHOUT COMPLICATION, UNSPECIFIED WHETHER LONG TERM INSULIN USE: ICD-10-CM

## 2019-05-27 NOTE — PROGRESS NOTES
Physical Therapy Daily Note/prgress note         Name: Maral LEOS [facility] Number: [id number]  Diagnosis:   Encounter Diagnosis   Name Primary?    Chronic neck pain      Physician: Rosemary Pearson MD  Treatment Orders: PT Eval and Treat  Past Medical History:   Diagnosis Date    Allergy     seasonal    Arthritis     Cataract     Hyperlipidemia 11/20/2014    Hyperparathyroidism 11/20/2014    Hypertension     Hypertension 11/20/2014    Joint pain     Keloid cicatrix     Obesity (BMI 30-39.9) 11/20/2014    Thyroid disease     Type II or unspecified type diabetes mellitus without mention of complication, uncontrolled 11/20/2014    Vitamin D deficiency 11/20/2014     Current Outpatient Medications   Medication Sig    blood sugar diagnostic Strp 1 each by Misc.(Non-Drug; Combo Route) route once daily.    blood-glucose meter kit Use as instructed    cholecalciferol, vitamin D3, 1,000 unit capsule Take 1 capsule (1,000 Units total) by mouth once daily.    doxazosin (CARDURA) 8 MG Tab Take 1 tablet (8 mg total) by mouth once daily.    felodipine (PLENDIL) 10 MG 24 hr tablet Take 1 tablet (10 mg total) by mouth once daily.    fluticasone (FLONASE) 50 mcg/actuation nasal spray 2 Spray, Suspension Nasal Every day    lancets 33 gauge Misc 1 lancet by Misc.(Non-Drug; Combo Route) route once daily.    losartan (COZAAR) 100 MG tablet Take 1 tablet (100 mg total) by mouth once daily.    metFORMIN (GLUCOPHAGE) 1000 MG tablet Take 1 tablet (1,000 mg total) by mouth 2 (two) times daily with meals.    metoprolol succinate (TOPROL-XL) 50 MG 24 hr tablet TAKE 1 TABLET(50 MG) BY MOUTH EVERY DAY    mupirocin (BACTROBAN) 2 % ointment Apply to affected area 3 times daily    PNEUMOVAX 23 25 mcg/0.5 mL     potassium chloride (K-TAB) 20 mEq Take 1 tablet (20 mEq total) by mouth once daily.    potassium phosphate, monobasic, (K-PHOS) 500 mg TbSO Take 1 tablet (500  "mg total) by mouth once daily.    promethazine-dextromethorphan (PROMETHAZINE-DM) 6.25-15 mg/5 mL Syrp Take one tsp po q 6 hrs prn cough     No current facility-administered medications for this visit.      Review of patient's allergies indicates:  No Known Allergies    Precautions: standard  Visit #: 4of 12  PTA Visit #: 1  Time In: 1105  Time Out: 1200  Total Treatment Time 1:1: 55     Evaluation Date: 3/13/19  Authorization period: 2/14/20  Plan of care Expiration: 6/12/19  MD referral: neck pain      Subjective     Pt reports h"my neck still aches at time, but it's doing a whole lot better"   Pain Scale: Maral rates pain at  neck on a scale of 0-10 to be 1 currently.    Objective        Maral received individual therapeutic exercises to develop strength, endurance, ROM, flexibility, posture and core stabilization for 45 minutes including:    UBE 6 min  Rows GTB 3x10 NP  Shoulder ext GTB 3x10 NP  B shoulder ER OTB 2x10   Snag Rot 20x NP  Seated UT S 3x30"  Seated LS S 3x30"  Seated T/S rot w/ pillow 20x NP  [place] and arrows OTB 2x10      Chin tucks 20x5" hold  NP  Supine serratus punch 3x10 2#  Supine scaption 2x10  NP  Supine HZ abd OTB 2x10      IR/ER walkouts OTB 2x10 B    Palpation: increased muscle tone noted R UT        HEP: as per eval      Maral received the following manual therapy techniques: Joint mobilizations were applied for 10 minutes including:  Cervical upglides   Soft tissue mobilization & myofascial release /c hawk  R UT     Written Home Exercises Provided: per eval  Pt demo good understanding of the education provided. Maral demonstrated good return demonstration of activities.     Education provided re: compliance w/ therapy. LANNY Alicia verbalized good understanding of education provided.   No spiritual or educational barriers to learning provided    Assessment      pt required verbal & tactile cues for correct technique with therex due to infrequent PT visits. Pt tolerated " progression of exercises well without increased pain. Pt demos good improvements in strength and ROM, continuing to work on posture and cervical mobility. Pt making good progress towards goals.   This is a 69 y.o. female referred to outpatient physical therapy and presents with a medical diagnosis of  C/S pain and demonstrates limitations as described in the problem list. Pt prognosis is Good. Pt will continue to benefit from skilled outpatient physical therapy 1-2x/week for 5 weeks to address the deficits listed in the problem list, provide pt/family education and to maximize pt's level of independence in the home and community environment.     Goals as follows:  Short Term GOALS: 3 weeks. Pt agrees with goals set.  1. Patient demonstrates independence with HEP. -met  2. Patient demonstrates independence with Postural Awareness. - progressing  3. Patient demonstrates independence with body mechanics. progressing     Long Term GOALS: 6 weeks. Pt agrees with goals set.  1. Patient demonstrates increased cervical ROM by 10 deg to improve tolerance to functional activities. met  2. Patient demonstrates increased strength BUE's by 1 MMT grade or greater to improve tolerance to functional activities. Met all but shoulder ER  3. Patient demonstrates improved overall function per FOTO Neck Survey to 44% or less.   4. Pt will be able to look to turn while driving with <2/10 pain-met     Updated goals:   -Cont with STG 2,3,   -Improve shoulder ER by 1 mmt grade  to improve tolerance to functional activities.    Plan     Continue with established Plan of Care towards PT goals     Therapist: Herminia Byrne, PT, DPT  5/28/2019

## 2019-05-28 ENCOUNTER — TELEPHONE (OUTPATIENT)
Dept: INTERNAL MEDICINE | Facility: CLINIC | Age: 69
End: 2019-05-28

## 2019-05-28 ENCOUNTER — OFFICE VISIT (OUTPATIENT)
Dept: INTERNAL MEDICINE | Facility: CLINIC | Age: 69
End: 2019-05-28
Payer: MEDICARE

## 2019-05-28 ENCOUNTER — CLINICAL SUPPORT (OUTPATIENT)
Dept: REHABILITATION | Facility: HOSPITAL | Age: 69
End: 2019-05-28
Attending: INTERNAL MEDICINE
Payer: MEDICARE

## 2019-05-28 DIAGNOSIS — I10 ESSENTIAL HYPERTENSION: ICD-10-CM

## 2019-05-28 DIAGNOSIS — M54.2 CHRONIC NECK PAIN: ICD-10-CM

## 2019-05-28 DIAGNOSIS — G89.29 CHRONIC NECK PAIN: ICD-10-CM

## 2019-05-28 DIAGNOSIS — E11.9 DIABETES MELLITUS WITHOUT COMPLICATION: Primary | ICD-10-CM

## 2019-05-28 DIAGNOSIS — Z01.419 WELL WOMAN EXAM: ICD-10-CM

## 2019-05-28 PROCEDURE — 99999 PR PBB SHADOW E&M-EST. PATIENT-LVL V: ICD-10-PCS | Mod: PBBFAC,,, | Performed by: INTERNAL MEDICINE

## 2019-05-28 PROCEDURE — 99214 PR OFFICE/OUTPT VISIT, EST, LEVL IV, 30-39 MIN: ICD-10-PCS | Mod: S$GLB,,, | Performed by: INTERNAL MEDICINE

## 2019-05-28 PROCEDURE — 99214 OFFICE O/P EST MOD 30 MIN: CPT | Mod: S$GLB,,, | Performed by: INTERNAL MEDICINE

## 2019-05-28 PROCEDURE — 3045F PR MOST RECENT HEMOGLOBIN A1C LEVEL 7.0-9.0%: ICD-10-PCS | Mod: CPTII,S$GLB,, | Performed by: INTERNAL MEDICINE

## 2019-05-28 PROCEDURE — 99499 UNLISTED E&M SERVICE: CPT | Mod: S$GLB,,, | Performed by: INTERNAL MEDICINE

## 2019-05-28 PROCEDURE — 3079F PR MOST RECENT DIASTOLIC BLOOD PRESSURE 80-89 MM HG: ICD-10-PCS | Mod: CPTII,S$GLB,, | Performed by: INTERNAL MEDICINE

## 2019-05-28 PROCEDURE — 3075F PR MOST RECENT SYSTOLIC BLOOD PRESS GE 130-139MM HG: ICD-10-PCS | Mod: CPTII,S$GLB,, | Performed by: INTERNAL MEDICINE

## 2019-05-28 PROCEDURE — 99499 RISK ADDL DX/OHS AUDIT: ICD-10-PCS | Mod: S$GLB,,, | Performed by: INTERNAL MEDICINE

## 2019-05-28 PROCEDURE — 97140 MANUAL THERAPY 1/> REGIONS: CPT

## 2019-05-28 PROCEDURE — 97110 THERAPEUTIC EXERCISES: CPT

## 2019-05-28 PROCEDURE — 1101F PR PT FALLS ASSESS DOC 0-1 FALLS W/OUT INJ PAST YR: ICD-10-PCS | Mod: CPTII,S$GLB,, | Performed by: INTERNAL MEDICINE

## 2019-05-28 PROCEDURE — 3045F PR MOST RECENT HEMOGLOBIN A1C LEVEL 7.0-9.0%: CPT | Mod: CPTII,S$GLB,, | Performed by: INTERNAL MEDICINE

## 2019-05-28 PROCEDURE — 3075F SYST BP GE 130 - 139MM HG: CPT | Mod: CPTII,S$GLB,, | Performed by: INTERNAL MEDICINE

## 2019-05-28 PROCEDURE — 99999 PR PBB SHADOW E&M-EST. PATIENT-LVL V: CPT | Mod: PBBFAC,,, | Performed by: INTERNAL MEDICINE

## 2019-05-28 PROCEDURE — 3079F DIAST BP 80-89 MM HG: CPT | Mod: CPTII,S$GLB,, | Performed by: INTERNAL MEDICINE

## 2019-05-28 PROCEDURE — 1101F PT FALLS ASSESS-DOCD LE1/YR: CPT | Mod: CPTII,S$GLB,, | Performed by: INTERNAL MEDICINE

## 2019-05-28 RX ORDER — METOPROLOL SUCCINATE 50 MG/1
TABLET, EXTENDED RELEASE ORAL
Qty: 90 TABLET | Refills: 1 | Status: SHIPPED | OUTPATIENT
Start: 2019-05-28 | End: 2019-11-20 | Stop reason: SDUPTHER

## 2019-05-28 RX ORDER — CANDESARTAN 16 MG/1
16 TABLET ORAL DAILY
Qty: 90 TABLET | Refills: 1 | Status: SHIPPED | OUTPATIENT
Start: 2019-05-28 | End: 2019-11-20 | Stop reason: SDUPTHER

## 2019-05-28 RX ORDER — DOXAZOSIN 8 MG/1
8 TABLET ORAL DAILY
Qty: 90 TABLET | Refills: 1 | Status: SHIPPED | OUTPATIENT
Start: 2019-05-28 | End: 2019-11-20 | Stop reason: SDUPTHER

## 2019-05-28 RX ORDER — METFORMIN HYDROCHLORIDE 1000 MG/1
1000 TABLET ORAL 2 TIMES DAILY WITH MEALS
Qty: 180 TABLET | Refills: 1 | Status: SHIPPED | OUTPATIENT
Start: 2019-05-28 | End: 2020-06-02

## 2019-05-28 RX ORDER — FELODIPINE 10 MG/1
10 TABLET, EXTENDED RELEASE ORAL DAILY
Qty: 90 TABLET | Refills: 1 | Status: SHIPPED | OUTPATIENT
Start: 2019-05-28 | End: 2019-11-20 | Stop reason: ALTCHOICE

## 2019-05-28 NOTE — TELEPHONE ENCOUNTER
----- Message from Fabiola Elliott sent at 5/28/2019  2:04 PM CDT -----  Patient stated she would call back for all of her appointments. Numbers provided to her for scheduling

## 2019-05-30 ENCOUNTER — CLINICAL SUPPORT (OUTPATIENT)
Dept: REHABILITATION | Facility: HOSPITAL | Age: 69
End: 2019-05-30
Attending: INTERNAL MEDICINE
Payer: MEDICARE

## 2019-05-30 DIAGNOSIS — M54.2 CHRONIC NECK PAIN: ICD-10-CM

## 2019-05-30 DIAGNOSIS — G89.29 CHRONIC NECK PAIN: ICD-10-CM

## 2019-05-30 PROCEDURE — 97140 MANUAL THERAPY 1/> REGIONS: CPT

## 2019-05-30 PROCEDURE — 97110 THERAPEUTIC EXERCISES: CPT

## 2019-05-30 NOTE — PROGRESS NOTES
Physical Therapy Daily Note/prgress note         Name: Maral LEOS [facility] Number: [id number]  Diagnosis:   Encounter Diagnosis   Name Primary?    Chronic neck pain      Physician: Rosemary Pearson MD  Treatment Orders: PT Eval and Treat  Past Medical History:   Diagnosis Date    Allergy     seasonal    Arthritis     Cataract     Hyperlipidemia 11/20/2014    Hyperparathyroidism 11/20/2014    Hypertension     Hypertension 11/20/2014    Joint pain     Keloid cicatrix     Obesity (BMI 30-39.9) 11/20/2014    Thyroid disease     Type II or unspecified type diabetes mellitus without mention of complication, uncontrolled 11/20/2014    Vitamin D deficiency 11/20/2014     Current Outpatient Medications   Medication Sig    blood sugar diagnostic Strp 1 each by Misc.(Non-Drug; Combo Route) route once daily.    blood-glucose meter kit Use as instructed    candesartan (ATACAND) 16 MG tablet Take 1 tablet (16 mg total) by mouth once daily.    cholecalciferol, vitamin D3, 1,000 unit capsule Take 1 capsule (1,000 Units total) by mouth once daily.    doxazosin (CARDURA) 8 MG Tab Take 1 tablet (8 mg total) by mouth once daily.    felodipine (PLENDIL) 10 MG 24 hr tablet Take 1 tablet (10 mg total) by mouth once daily.    fluticasone (FLONASE) 50 mcg/actuation nasal spray 2 Spray, Suspension Nasal Every day    lancets 33 gauge Misc 1 lancet by Misc.(Non-Drug; Combo Route) route once daily.    metFORMIN (GLUCOPHAGE) 1000 MG tablet Take 1 tablet (1,000 mg total) by mouth 2 (two) times daily with meals.    metoprolol succinate (TOPROL-XL) 50 MG 24 hr tablet TAKE 1 TABLET(50 MG) BY MOUTH EVERY DAY    mupirocin (BACTROBAN) 2 % ointment Apply to affected area 3 times daily    PNEUMOVAX 23 25 mcg/0.5 mL     potassium chloride (K-TAB) 20 mEq Take 1 tablet (20 mEq total) by mouth once daily.    potassium phosphate, monobasic, (K-PHOS) 500 mg TbSO Take 1 tablet  "(500 mg total) by mouth once daily.    promethazine-dextromethorphan (PROMETHAZINE-DM) 6.25-15 mg/5 mL Syrp Take one tsp po q 6 hrs prn cough     No current facility-administered medications for this visit.      Review of patient's allergies indicates:  No Known Allergies    Precautions: standard  Visit #: 4of 12  PTA Visit #: 1  Time In: 1105  Time Out: 1200  Total Treatment Time  55     Evaluation Date: 3/13/19  Authorization period: 2/14/20  Plan of care Expiration: 6/12/19  MD referral: neck pain      Subjective     Pt reports my neck still gets achy at times, but it's doing a whole lot better"   Pain Scale: Maral rates pain at  neck on a scale of 0-10 to be 0 currently.    Objective        Maral received individual therapeutic exercises to develop strength, endurance, ROM, flexibility, posture and core stabilization for 45 minutes including:    UBE 6 min  Rows GTB 3x10 NP  Shoulder ext GTB 3x10 NP  B shoulder ER OTB 2x10   Snag Rot 20x NP  Seated UT S 3x30"  Seated LS S 3x30"  Seated T/S rot w/ pillow 20x NP  [place] and arrows OTB 2x10      Chin tucks 20x5" hold  NP  Supine serratus punch 3x10 2#  Supine scaption 2x10  NP  Supine HZ abd OTB 2x10      IR/ER walkouts OTB 2x10 B    Palpation: increased muscle tone noted R UT        HEP: as per eval      Maral received the following manual therapy techniques: Joint mobilizations were applied for 10 minutes including:  Cervical upglides   Soft tissue mobilization & myofascial release /c hawk  R UT     Written Home Exercises Provided: per eval  Pt demo good understanding of the education provided. Maral demonstrated good return demonstration of activities.     Education provided re: compliance w/ therapy. LANNY  Maral verbalized good understanding of education provided.   No spiritual or educational barriers to learning provided    Assessment      pt required occasional verbal & tactile cues for correct technique with therex due. Pt tolerated exercises well " without c/o pain. Pt demos good improvements in strength and ROM, continuing to work on posture and cervical mobility. Pt making good progress towards goals.   This is a 69 y.o. female referred to outpatient physical therapy and presents with a medical diagnosis of  C/S pain and demonstrates limitations as described in the problem list. Pt prognosis is Good. Pt will continue to benefit from skilled outpatient physical therapy 1-2x/week for 5 weeks to address the deficits listed in the problem list, provide pt/family education and to maximize pt's level of independence in the home and community environment.     Goals as follows:  Short Term GOALS: 3 weeks. Pt agrees with goals set.  1. Patient demonstrates independence with HEP. -met  2. Patient demonstrates independence with Postural Awareness. - progressing  3. Patient demonstrates independence with body mechanics. progressing     Long Term GOALS: 6 weeks. Pt agrees with goals set.  1. Patient demonstrates increased cervical ROM by 10 deg to improve tolerance to functional activities. met  2. Patient demonstrates increased strength BUE's by 1 MMT grade or greater to improve tolerance to functional activities. Met all but shoulder ER  3. Patient demonstrates improved overall function per FOTO Neck Survey to 44% or less.   4. Pt will be able to look to turn while driving with <2/10 pain-met     Updated goals:   -Cont with STG 2,3,   -Improve shoulder ER by 1 mmt grade  to improve tolerance to functional activities.    Plan     Continue with established Plan of Care towards PT goals     Therapist: Flavio Garza PTA,   5/30/2019

## 2019-06-03 VITALS
HEART RATE: 69 BPM | HEIGHT: 67 IN | OXYGEN SATURATION: 96 % | WEIGHT: 241.38 LBS | DIASTOLIC BLOOD PRESSURE: 88 MMHG | BODY MASS INDEX: 37.89 KG/M2 | SYSTOLIC BLOOD PRESSURE: 138 MMHG

## 2019-06-04 NOTE — PROGRESS NOTES
Subjective:       Patient ID: Maral Linn is a 69 y.o. female.    Chief Complaint: Hypertension    HPI  She returns for management of hypertension.  She has had hypertension for over a year.  Current treatment has included medications outlined in medication list.  She denies chest pain or shortness of breath.  No palpitations.  Denies left arm or neck pain. She has diabetes.  Denies polyuria, polydipsia    Medications:  See med list    Social history:  Does not smoke, does not drink alcohol      Review of Systems   Constitutional: Negative for chills, fatigue, fever and unexpected weight change.   Respiratory: Negative for chest tightness and shortness of breath.    Cardiovascular: Negative for chest pain and palpitations.   Gastrointestinal: Negative for abdominal pain and blood in stool.   Neurological: Negative for dizziness, syncope, numbness and headaches.       Objective:      Physical Exam   HENT:   Right Ear: External ear normal.   Left Ear: External ear normal.   Nose: Nose normal.   Mouth/Throat: Oropharynx is clear and moist.   Eyes: Pupils are equal, round, and reactive to light.   Neck: Normal range of motion.   Cardiovascular: Normal rate and regular rhythm.   No murmur heard.  Pulmonary/Chest: Breath sounds normal.   Abdominal: She exhibits no distension. There is no hepatosplenomegaly. There is no tenderness.   Lymphadenopathy:     She has no cervical adenopathy.     She has no axillary adenopathy.   Neurological: She has normal strength and normal reflexes. No cranial nerve deficit or sensory deficit.       Assessment/Plan       Assessment and plan:  1.  Hypertension:  Discontinue Cozaar.  Start atacand 16 mg daily.  Return to clinic in 1 month blood pressure check  2.  Diabetes:  Check CMP and A1c

## 2019-06-04 NOTE — PROGRESS NOTES
Physical Therapy Daily Note/prgress note         Name: Maral LEOS [facility] Number: [id number]  Diagnosis:   Encounter Diagnosis   Name Primary?    Chronic neck pain      Physician: Rosemary Pearson MD  Treatment Orders: PT Eval and Treat  Past Medical History:   Diagnosis Date    Allergy     seasonal    Arthritis     Cataract     Hyperlipidemia 11/20/2014    Hyperparathyroidism 11/20/2014    Hypertension     Hypertension 11/20/2014    Joint pain     Keloid cicatrix     Obesity (BMI 30-39.9) 11/20/2014    Thyroid disease     Type II or unspecified type diabetes mellitus without mention of complication, uncontrolled 11/20/2014    Vitamin D deficiency 11/20/2014     Current Outpatient Medications   Medication Sig    blood sugar diagnostic Strp 1 each by Misc.(Non-Drug; Combo Route) route once daily.    blood-glucose meter kit Use as instructed    candesartan (ATACAND) 16 MG tablet Take 1 tablet (16 mg total) by mouth once daily.    cholecalciferol, vitamin D3, 1,000 unit capsule Take 1 capsule (1,000 Units total) by mouth once daily.    doxazosin (CARDURA) 8 MG Tab Take 1 tablet (8 mg total) by mouth once daily.    felodipine (PLENDIL) 10 MG 24 hr tablet Take 1 tablet (10 mg total) by mouth once daily.    fluticasone (FLONASE) 50 mcg/actuation nasal spray 2 Spray, Suspension Nasal Every day    lancets 33 gauge Misc 1 lancet by Misc.(Non-Drug; Combo Route) route once daily.    metFORMIN (GLUCOPHAGE) 1000 MG tablet Take 1 tablet (1,000 mg total) by mouth 2 (two) times daily with meals.    metoprolol succinate (TOPROL-XL) 50 MG 24 hr tablet TAKE 1 TABLET(50 MG) BY MOUTH EVERY DAY    mupirocin (BACTROBAN) 2 % ointment Apply to affected area 3 times daily    PNEUMOVAX 23 25 mcg/0.5 mL     potassium chloride (K-TAB) 20 mEq Take 1 tablet (20 mEq total) by mouth once daily.    potassium phosphate, monobasic, (K-PHOS) 500 mg TbSO Take 1 tablet  "(500 mg total) by mouth once daily.    promethazine-dextromethorphan (PROMETHAZINE-DM) 6.25-15 mg/5 mL Syrp Take one tsp po q 6 hrs prn cough     No current facility-administered medications for this visit.      Review of patient's allergies indicates:  No Known Allergies    Precautions: standard  Visit #: 5 of 12  PTA Visit #: 1  Time In: 1100  Time Out: 1245  Total Treatment Time  45     Evaluation Date: 3/13/19  Authorization period: 2/14/20  Plan of care Expiration: 6/12/19  MD referral: neck pain      Subjective     Pt reports pt started water aerobics class yesterday, states it is helping her a lot.   Pain Scale: Maral rates pain at  neck on a scale of 0-10 to be 0 currently.    Objective        Maral received individual education & therapeutic exercises to develop strength, endurance, ROM, flexibility, posture and core stabilization for 45 minutes including:    UBE 6 min  Rows GTB 3x10 NP  Shoulder ext GTB 3x10 NP  B shoulder ER OTB 2x10 NP  Snag Rot 20x NP  Seated UT S 3x30" NP  Seated LS S 3x30" NP  Seated T/S rot w/ pillow 20x NP  [place] and arrows OTB 2x10 NP     Chin tucks 20x5" hold  NP  Supine serratus punch 2x10 3#  Supine scaption 2x10  1#  Supine HZ abd GTB 3x10   sidelying shoulder ER 1# 2x10 B    Prone rows 2# 2x10  Prone shoulder Ext 2# 2x10 B     Body blade shoulder in 90 deg flex B 2x30"  Body blade elbow in 90 deg flex (shoulder 0 deg) B 2x30"    IR/ER walkouts OTB 2x10 B NP    Palpation: increased muscle tone noted R UT        HEP: as per eval      Maral received the following manual therapy techniques: Joint mobilizations were applied for 00 minutes including:    Cervical upglides  NP  Soft tissue mobilization & myofascial release /c hawk  R UT     Written Home Exercises Provided: per pt instructions on 6/6/19  Pt demo good understanding of the education provided. Maral demonstrated good return demonstration of activities.     Education provided re: safe exercise progression with HEP, " reviewed entire HEP with updated handout provided   Renaldomegaan verbalized good understanding of education provided.   No spiritual or educational barriers to learning provided    Assessment      pt progressed functional B shoulder strengthening & stabilization without adverse effects. Pt plans to continue PT 1 more week in order to be (I) with HEP. pt required occasional verbal & tactile cues for correct technique with therex.  Pt making good progress towards goals.   This is a 69 y.o. female referred to outpatient physical therapy and presents with a medical diagnosis of  C/S pain and demonstrates limitations as described in the problem list. Pt prognosis is Good. Pt will continue to benefit from skilled outpatient physical therapy 1-2x/week for 5 weeks to address the deficits listed in the problem list, provide pt/family education and to maximize pt's level of independence in the home and community environment.     Goals as follows:  Short Term GOALS: 3 weeks. Pt agrees with goals set.  1. Patient demonstrates independence with HEP. -met  2. Patient demonstrates independence with Postural Awareness. -met 6/6/19  3. Patient demonstrates independence with body mechanics. progressing     Long Term GOALS: 6 weeks. Pt agrees with goals set.  1. Patient demonstrates increased cervical ROM by 10 deg to improve tolerance to functional activities. met  2. Patient demonstrates increased strength BUE's by 1 MMT grade or greater to improve tolerance to functional activities. Met all but shoulder ER  3. Patient demonstrates improved overall function per FOTO Neck Survey to 44% or less.   4. Pt will be able to look to turn while driving with <2/10 pain-met     Updated goals:   -Cont with STG 2,3,   -Improve shoulder ER by 1 mmt grade  to improve tolerance to functional activities.    Plan     Continue with established Plan of Care towards PT goals     Therapist: Herminia Byrne, PT,   6/6/2019

## 2019-06-06 ENCOUNTER — CLINICAL SUPPORT (OUTPATIENT)
Dept: REHABILITATION | Facility: HOSPITAL | Age: 69
End: 2019-06-06
Attending: INTERNAL MEDICINE
Payer: MEDICARE

## 2019-06-06 DIAGNOSIS — G89.29 CHRONIC NECK PAIN: ICD-10-CM

## 2019-06-06 DIAGNOSIS — M54.2 CHRONIC NECK PAIN: ICD-10-CM

## 2019-06-06 PROCEDURE — 97110 THERAPEUTIC EXERCISES: CPT

## 2019-06-13 ENCOUNTER — CLINICAL SUPPORT (OUTPATIENT)
Dept: REHABILITATION | Facility: HOSPITAL | Age: 69
End: 2019-06-13
Attending: INTERNAL MEDICINE
Payer: MEDICARE

## 2019-06-13 DIAGNOSIS — M54.2 CHRONIC NECK PAIN: ICD-10-CM

## 2019-06-13 DIAGNOSIS — G89.29 CHRONIC NECK PAIN: ICD-10-CM

## 2019-06-13 PROCEDURE — 97110 THERAPEUTIC EXERCISES: CPT

## 2019-06-13 PROCEDURE — G8979 MOBILITY GOAL STATUS: HCPCS | Mod: CK

## 2019-06-13 PROCEDURE — G8980 MOBILITY D/C STATUS: HCPCS | Mod: CI

## 2019-06-13 NOTE — PROGRESS NOTES
Physical Therapy Daily/ Discharge Note         Name: Maral ELOS [facility] Number: [id number]  Diagnosis:   Encounter Diagnosis   Name Primary?    Chronic neck pain      Physician: Rosemary Pearson MD  Treatment Orders: PT Eval and Treat  Past Medical History:   Diagnosis Date    Allergy     seasonal    Arthritis     Cataract     Hyperlipidemia 11/20/2014    Hyperparathyroidism 11/20/2014    Hypertension     Hypertension 11/20/2014    Joint pain     Keloid cicatrix     Obesity (BMI 30-39.9) 11/20/2014    Thyroid disease     Type II or unspecified type diabetes mellitus without mention of complication, uncontrolled 11/20/2014    Vitamin D deficiency 11/20/2014     Current Outpatient Medications   Medication Sig    blood sugar diagnostic Strp 1 each by Misc.(Non-Drug; Combo Route) route once daily.    blood-glucose meter kit Use as instructed    candesartan (ATACAND) 16 MG tablet Take 1 tablet (16 mg total) by mouth once daily.    cholecalciferol, vitamin D3, 1,000 unit capsule Take 1 capsule (1,000 Units total) by mouth once daily.    doxazosin (CARDURA) 8 MG Tab Take 1 tablet (8 mg total) by mouth once daily.    felodipine (PLENDIL) 10 MG 24 hr tablet Take 1 tablet (10 mg total) by mouth once daily.    fluticasone (FLONASE) 50 mcg/actuation nasal spray 2 Spray, Suspension Nasal Every day    lancets 33 gauge Misc 1 lancet by Misc.(Non-Drug; Combo Route) route once daily.    metFORMIN (GLUCOPHAGE) 1000 MG tablet Take 1 tablet (1,000 mg total) by mouth 2 (two) times daily with meals.    metoprolol succinate (TOPROL-XL) 50 MG 24 hr tablet TAKE 1 TABLET(50 MG) BY MOUTH EVERY DAY    mupirocin (BACTROBAN) 2 % ointment Apply to affected area 3 times daily    PNEUMOVAX 23 25 mcg/0.5 mL     potassium chloride (K-TAB) 20 mEq Take 1 tablet (20 mEq total) by mouth once daily.    potassium phosphate, monobasic, (K-PHOS) 500 mg TbSO Take 1 tablet (500  "mg total) by mouth once daily.    promethazine-dextromethorphan (PROMETHAZINE-DM) 6.25-15 mg/5 mL Syrp Take one tsp po q 6 hrs prn cough     No current facility-administered medications for this visit.      Review of patient's allergies indicates:  No Known Allergies    Precautions: standard  Visit #: 6 of 12  PTA Visit #: 0  Time In: 11:00  Time Out: 11:30  Total Treatment Time  30     Evaluation Date: 3/13/19  Authorization period: 2/14/20  Plan of care Expiration: 6/12/19  MD referral: neck pain      Subjective     Pt reports that she is feeling much much better compared to initial evaluation.  She is ready to be discharged.  Pain Scale: Maral rates pain at  neck on a scale of 0-10 to be 0 currently.    Objective        Maral received individual education & therapeutic exercises to develop strength, endurance, ROM, flexibility, posture and core stabilization for 30 minutes including:    UBE 6 min  Rows GTB 3x10   Shoulder ext GTB 3x10   Seated UT S 3x30"   Seated LS S 3x30"   Seated T/S rot w/ pillow 20x   [place] and arrows OTB 2x10      Chin tucks 20x5" hold    Supine serratus punch 2x10 3#  Supine scaption 2x10  1#  Supine HZ abd GTB 3x10   sidelying shoulder ER 1# 2x10 B    Prone rows 2# 2x10  Prone shoulder Ext 2# 2x10 B     Body blade shoulder in 90 deg flex B 2x30"  Body blade elbow in 90 deg flex (shoulder 0 deg) B 2x30"      Written Home Exercises Provided: per pt instructions on 6/6/19  Pt demo good understanding of the education provided. Maral demonstrated good return demonstration of activities.     Education provided re: safe exercise progression with HEP, reviewed entire HEP with updated handout provided   Maral verbalized good understanding of education provided.   No spiritual or educational barriers to learning provided    Assessment     Pt has met all goals established by PT and is now discharged from outpatient PT. PT provided pt c/ green theraband to use c/ HEP. PT went over entire HEP and " pt demo'd full understanding of all exercises.  PT encouraged pt to contact PT c/ any questions or concerns.    Goals as follows:  Short Term GOALS: 3 weeks. Pt agrees with goals set.  1. Patient demonstrates independence with HEP. -met  2. Patient demonstrates independence with Postural Awareness. -met 6/6/19  3. Patient demonstrates independence with body mechanics. (MET 6/13/19)       Long Term GOALS: 6 weeks. Pt agrees with goals set.  1. Patient demonstrates increased cervical ROM by 10 deg to improve tolerance to functional activities. met  2. Patient demonstrates increased strength BUE's by 1 MMT grade or greater to improve tolerance to functional activities. (MET 6/13/19)  3. Patient demonstrates improved overall function per FOTO Neck Survey to 44% or less. (MET 6/13/19)  4. Pt will be able to look to turn while driving with <2/10 pain-met     Updated goals:   -Cont with STG 2,3, (MET 6/13/19)    -Improve shoulder ER by 1 mmt grade  to improve tolerance to functional activities.(MET 6/13/19)      FOTO score on 6/13/19: 18% limitation    Plan     Discharged from outpatient PT    Therapist: Kushal Suarez, PT, DPT  6/13/2019

## 2019-07-24 ENCOUNTER — OFFICE VISIT (OUTPATIENT)
Dept: PODIATRY | Facility: CLINIC | Age: 69
End: 2019-07-24
Payer: MEDICARE

## 2019-07-24 VITALS
SYSTOLIC BLOOD PRESSURE: 187 MMHG | HEART RATE: 73 BPM | HEIGHT: 67 IN | BODY MASS INDEX: 37.98 KG/M2 | DIASTOLIC BLOOD PRESSURE: 88 MMHG | WEIGHT: 242 LBS

## 2019-07-24 DIAGNOSIS — E11.9 CONTROLLED TYPE 2 DIABETES MELLITUS WITHOUT COMPLICATION, WITHOUT LONG-TERM CURRENT USE OF INSULIN: Primary | ICD-10-CM

## 2019-07-24 PROCEDURE — 99213 OFFICE O/P EST LOW 20 MIN: CPT | Mod: S$GLB,,, | Performed by: PODIATRIST

## 2019-07-24 PROCEDURE — 3077F SYST BP >= 140 MM HG: CPT | Mod: CPTII,S$GLB,, | Performed by: PODIATRIST

## 2019-07-24 PROCEDURE — 3045F PR MOST RECENT HEMOGLOBIN A1C LEVEL 7.0-9.0%: CPT | Mod: CPTII,S$GLB,, | Performed by: PODIATRIST

## 2019-07-24 PROCEDURE — 99213 PR OFFICE/OUTPT VISIT, EST, LEVL III, 20-29 MIN: ICD-10-PCS | Mod: S$GLB,,, | Performed by: PODIATRIST

## 2019-07-24 PROCEDURE — 99999 PR PBB SHADOW E&M-EST. PATIENT-LVL III: ICD-10-PCS | Mod: PBBFAC,,, | Performed by: PODIATRIST

## 2019-07-24 PROCEDURE — 1101F PR PT FALLS ASSESS DOC 0-1 FALLS W/OUT INJ PAST YR: ICD-10-PCS | Mod: CPTII,S$GLB,, | Performed by: PODIATRIST

## 2019-07-24 PROCEDURE — 99999 PR PBB SHADOW E&M-EST. PATIENT-LVL III: CPT | Mod: PBBFAC,,, | Performed by: PODIATRIST

## 2019-07-24 PROCEDURE — 3079F DIAST BP 80-89 MM HG: CPT | Mod: CPTII,S$GLB,, | Performed by: PODIATRIST

## 2019-07-24 PROCEDURE — 1101F PT FALLS ASSESS-DOCD LE1/YR: CPT | Mod: CPTII,S$GLB,, | Performed by: PODIATRIST

## 2019-07-24 PROCEDURE — 3045F PR MOST RECENT HEMOGLOBIN A1C LEVEL 7.0-9.0%: ICD-10-PCS | Mod: CPTII,S$GLB,, | Performed by: PODIATRIST

## 2019-07-24 PROCEDURE — 3079F PR MOST RECENT DIASTOLIC BLOOD PRESSURE 80-89 MM HG: ICD-10-PCS | Mod: CPTII,S$GLB,, | Performed by: PODIATRIST

## 2019-07-24 PROCEDURE — 3077F PR MOST RECENT SYSTOLIC BLOOD PRESSURE >= 140 MM HG: ICD-10-PCS | Mod: CPTII,S$GLB,, | Performed by: PODIATRIST

## 2019-07-24 NOTE — PROGRESS NOTES
Subjective:      Patient ID: Maral Linn is a 69 y.o. female.    Chief Complaint: Comprehensive diabetic foot examination, type 2 DM, encounte (bilateral pcp Dr Pearson 5/28/19) and Toe Pain (sometimes)    Maral is a 69 y.o. female who presents to the clinic upon referral from Dr. Fong ref. provider found  for evaluation and treatment of diabetic feet. Maral has a past medical history of Allergy, Arthritis, Cataract, Hyperlipidemia (11/20/2014), Hyperparathyroidism (11/20/2014), Hypertension, Hypertension (11/20/2014), Joint pain, Keloid cicatrix, Obesity (BMI 30-39.9) (11/20/2014), Thyroid disease, Type II or unspecified type diabetes mellitus without mention of complication, uncontrolled (11/20/2014), and Vitamin D deficiency (11/20/2014). Patient relates no major problem with feet. Only complaints today consist of yearly comprehensive diabetic  foot examination  .    PCP: Rosemary Pearson MD    Date Last Seen by PCP:   Chief Complaint   Patient presents with    Comprehensive diabetic foot examination, type 2 DM, encounte     bilateral pcp Dr Pearson 5/28/19    Toe Pain     sometimes         Current shoe gear: Casual shoes    Hemoglobin A1C   Date Value Ref Range Status   02/22/2019 8.0 (H) 4.0 - 5.6 % Final     Comment:     ADA Screening Guidelines:  5.7-6.4%  Consistent with prediabetes  >or=6.5%  Consistent with diabetes  High levels of fetal hemoglobin interfere with the HbA1C  assay. Heterozygous hemoglobin variants (HbS, HgC, etc)do  not significantly interfere with this assay.   However, presence of multiple variants may affect accuracy.     04/27/2018 6.6 (H) 4.0 - 5.6 % Final     Comment:     According to ADA guidelines, hemoglobin A1c <7.0% represents  optimal control in non-pregnant diabetic patients. Different  metrics may apply to specific patient populations.   Standards of Medical Care in Diabetes-2016.  For the purpose of screening for the presence of diabetes:  <5.7%     Consistent with  the absence of diabetes  5.7-6.4%  Consistent with increasing risk for diabetes   (prediabetes)  >or=6.5%  Consistent with diabetes  Currently, no consensus exists for use of hemoglobin A1c  for diagnosis of diabetes for children.  This Hemoglobin A1c assay has significant interference with fetal   hemoglobin   (HbF). The results are invalid for patients with abnormal amounts of   HbF,   including those with known Hereditary Persistence   of Fetal Hemoglobin. Heterozygous hemoglobin variants (HbAS, HbAC,   HbAD, HbAE, HbA2) do not significantly interfere with this assay;   however, presence of multiple variants in a sample may impact the %   interference.     01/05/2018 6.3 (H) 4.0 - 5.6 % Final     Comment:     According to ADA guidelines, hemoglobin A1c <7.0% represents  optimal control in non-pregnant diabetic patients. Different  metrics may apply to specific patient populations.   Standards of Medical Care in Diabetes-2016.  For the purpose of screening for the presence of diabetes:  <5.7%     Consistent with the absence of diabetes  5.7-6.4%  Consistent with increasing risk for diabetes   (prediabetes)  >or=6.5%  Consistent with diabetes  Currently, no consensus exists for use of hemoglobin A1c  for diagnosis of diabetes for children.  This Hemoglobin A1c assay has significant interference with fetal   hemoglobin   (HbF). The results are invalid for patients with abnormal amounts of   HbF,   including those with known Hereditary Persistence   of Fetal Hemoglobin. Heterozygous hemoglobin variants (HbAS, HbAC,   HbAD, HbAE, HbA2) do not significantly interfere with this assay;   however, presence of multiple variants in a sample may impact the %   interference.             Review of Systems   Constitution: Negative for chills, decreased appetite and fever.   Cardiovascular: Negative for leg swelling.   Musculoskeletal: Negative for arthritis, joint pain, joint swelling and myalgias.   Gastrointestinal: Negative  for nausea and vomiting.   Neurological: Negative for loss of balance, numbness and paresthesias.           Objective:      Physical Exam   Constitutional: She is oriented to person, place, and time. She appears well-developed and well-nourished.  Non-toxic appearance. She does not have a sickly appearance. No distress.   alert and oriented x 3.    Cardiovascular:   Pulses:       Dorsalis pedis pulses are 2+ on the right side, and 2+ on the left side.        Posterior tibial pulses are 2+ on the right side, and 2+ on the left side.    Capillary refill time is within normal limits. Digital hair present.    Pulmonary/Chest: No respiratory distress.   Musculoskeletal: She exhibits no edema, tenderness or deformity.        Right ankle: Normal. No tenderness. No lateral malleolus, no medial malleolus, no AITFL, no CF ligament and no posterior TFL tenderness found. Achilles tendon exhibits no pain, no defect and normal Hernandez's test results.        Left ankle: Normal. No tenderness. No lateral malleolus, no medial malleolus, no AITFL, no CF ligament and no posterior TFL tenderness found. Achilles tendon exhibits no pain, no defect and normal Hernandez's test results.        Right foot: There is no tenderness, no bony tenderness, no swelling, no crepitus and no deformity.        Left foot: There is no tenderness, no bony tenderness, no swelling, no crepitus and no deformity.   Adequate joint range of motion without pain, limitation, nor crepitation Bilateral feet and ankle joints. Muscle strength is 5/5 in all groups bilaterally.         Feet:   Right Foot:   Protective Sensation: 5 sites tested. 5 sites sensed.   Left Foot:   Protective Sensation: 5 sites tested. 5 sites sensed.   Lymphadenopathy:   No palpable lymph nodes   Neurological: She is alert and oriented to person, place, and time. She has normal strength. She displays no atrophy. No sensory deficit.   Light touch present     Skin: Skin is warm, dry and intact.  No rash noted. She is not diaphoretic. No cyanosis or erythema. No pallor. Nails show no clubbing.   Skin is of normal turgor.   Normal temperature gradient.  Examination of the skin reveals no evidence of significant rashes, open lesions, suspicious appearing nevi or other concerning lesions.          Toenails 1-5 bilaterally are neatly trimmed; of normal color and thickness   Psychiatric: She has a normal mood and affect. Her behavior is normal. Her mood appears not anxious. Her affect is not inappropriate. Her speech is not slurred. She is not combative. She is communicative. She is attentive.   Nursing note and vitals reviewed.            Assessment:       Encounter Diagnosis   Name Primary?    Controlled type 2 diabetes mellitus without complication, without long-term current use of insulin Yes         Plan:       Maral was seen today for comprehensive diabetic foot examination, type 2 dm, encounte and toe pain.    Diagnoses and all orders for this visit:    Controlled type 2 diabetes mellitus without complication, without long-term current use of insulin      I counseled the patient on her conditions, their implications and medical management.      - Shoe inspection. Diabetic Foot Education. Patient reminded of the importance of good nutrition and blood sugar control to help prevent podiatric complications of diabetes. Patient instructed on proper foot hygeine. We discussed wearing proper shoe gear, daily foot inspections, never walking without protective shoe gear, caution putting sharp instruments to feet     - Discussed DM foot care:  Wear comfortable, proper fitting shoes. Wash feet daily. Dry well. After drying, apply moisturizer to feet (no lotion to webspaces). Inspect feet daily for skin breaks, blisters, swelling, or redness. Wear cotton socks (preferably white)  Change socks every day. Do NOT walk barefoot. Do NOT use heating pads or warm/hot water soaks     - Discussed importance of daily  moisturizer to the feet such as Gold bonds diabetic foot cream    - Patient is low risk for developing lower extremity issues secondary to diabetes. I recommend continued yearly diabetic foot examinations.     - Patients PCP can perform yearly foot checks . Currently  patient has no pedal manifestations of DM    - Patients with out pedal manifestations of DM, do not qualify for nail/callus trimming     - RTC in  PRN

## 2019-08-08 DIAGNOSIS — I10 ESSENTIAL HYPERTENSION: ICD-10-CM

## 2019-08-08 RX ORDER — LOSARTAN POTASSIUM 100 MG/1
TABLET ORAL
Qty: 90 TABLET | Refills: 0 | OUTPATIENT
Start: 2019-08-08

## 2019-09-14 DIAGNOSIS — E83.39 HYPOPHOSPHATEMIA: ICD-10-CM

## 2019-09-16 RX ORDER — POTASSIUM CHLORIDE 1500 MG/1
20 TABLET, EXTENDED RELEASE ORAL DAILY
Qty: 30 TABLET | Refills: 5 | OUTPATIENT
Start: 2019-09-16

## 2019-11-15 ENCOUNTER — LAB VISIT (OUTPATIENT)
Dept: LAB | Facility: HOSPITAL | Age: 69
End: 2019-11-15
Attending: INTERNAL MEDICINE
Payer: MEDICARE

## 2019-11-15 DIAGNOSIS — E11.9 DIABETES MELLITUS WITHOUT COMPLICATION: ICD-10-CM

## 2019-11-15 PROCEDURE — 36415 COLL VENOUS BLD VENIPUNCTURE: CPT

## 2019-11-15 PROCEDURE — 83036 HEMOGLOBIN GLYCOSYLATED A1C: CPT

## 2019-11-16 LAB
ESTIMATED AVG GLUCOSE: 146 MG/DL (ref 68–131)
HBA1C MFR BLD HPLC: 6.7 % (ref 4–5.6)

## 2019-11-18 ENCOUNTER — TELEPHONE (OUTPATIENT)
Dept: INTERNAL MEDICINE | Facility: CLINIC | Age: 69
End: 2019-11-18

## 2019-11-20 ENCOUNTER — OFFICE VISIT (OUTPATIENT)
Dept: INTERNAL MEDICINE | Facility: CLINIC | Age: 69
End: 2019-11-20
Payer: MEDICARE

## 2019-11-20 ENCOUNTER — LAB VISIT (OUTPATIENT)
Dept: LAB | Facility: HOSPITAL | Age: 69
End: 2019-11-20
Attending: INTERNAL MEDICINE
Payer: MEDICARE

## 2019-11-20 VITALS
BODY MASS INDEX: 38.4 KG/M2 | DIASTOLIC BLOOD PRESSURE: 76 MMHG | OXYGEN SATURATION: 99 % | HEIGHT: 67 IN | WEIGHT: 244.69 LBS | SYSTOLIC BLOOD PRESSURE: 152 MMHG | HEART RATE: 68 BPM

## 2019-11-20 DIAGNOSIS — E04.1 THYROID NODULE: ICD-10-CM

## 2019-11-20 DIAGNOSIS — I10 ESSENTIAL HYPERTENSION: ICD-10-CM

## 2019-11-20 DIAGNOSIS — E21.3 HYPERPARATHYROIDISM: Primary | ICD-10-CM

## 2019-11-20 LAB
ALBUMIN SERPL BCP-MCNC: 3.8 G/DL (ref 3.5–5.2)
ALP SERPL-CCNC: 89 U/L (ref 55–135)
ALT SERPL W/O P-5'-P-CCNC: 15 U/L (ref 10–44)
ANION GAP SERPL CALC-SCNC: 6 MMOL/L (ref 8–16)
AST SERPL-CCNC: 14 U/L (ref 10–40)
BILIRUB SERPL-MCNC: 0.3 MG/DL (ref 0.1–1)
BUN SERPL-MCNC: 19 MG/DL (ref 8–23)
CALCIUM SERPL-MCNC: 11.3 MG/DL (ref 8.7–10.5)
CHLORIDE SERPL-SCNC: 107 MMOL/L (ref 95–110)
CO2 SERPL-SCNC: 27 MMOL/L (ref 23–29)
CREAT SERPL-MCNC: 1.1 MG/DL (ref 0.5–1.4)
EST. GFR  (AFRICAN AMERICAN): 59 ML/MIN/1.73 M^2
EST. GFR  (NON AFRICAN AMERICAN): 51 ML/MIN/1.73 M^2
GLUCOSE SERPL-MCNC: 96 MG/DL (ref 70–110)
POTASSIUM SERPL-SCNC: 4 MMOL/L (ref 3.5–5.1)
PROT SERPL-MCNC: 7.2 G/DL (ref 6–8.4)
SODIUM SERPL-SCNC: 140 MMOL/L (ref 136–145)

## 2019-11-20 PROCEDURE — 1126F PR PAIN SEVERITY QUANTIFIED, NO PAIN PRESENT: ICD-10-PCS | Mod: S$GLB,,, | Performed by: INTERNAL MEDICINE

## 2019-11-20 PROCEDURE — 3078F DIAST BP <80 MM HG: CPT | Mod: CPTII,S$GLB,, | Performed by: INTERNAL MEDICINE

## 2019-11-20 PROCEDURE — 1159F MED LIST DOCD IN RCRD: CPT | Mod: S$GLB,,, | Performed by: INTERNAL MEDICINE

## 2019-11-20 PROCEDURE — 1159F PR MEDICATION LIST DOCUMENTED IN MEDICAL RECORD: ICD-10-PCS | Mod: S$GLB,,, | Performed by: INTERNAL MEDICINE

## 2019-11-20 PROCEDURE — 3077F PR MOST RECENT SYSTOLIC BLOOD PRESSURE >= 140 MM HG: ICD-10-PCS | Mod: CPTII,S$GLB,, | Performed by: INTERNAL MEDICINE

## 2019-11-20 PROCEDURE — 1126F AMNT PAIN NOTED NONE PRSNT: CPT | Mod: S$GLB,,, | Performed by: INTERNAL MEDICINE

## 2019-11-20 PROCEDURE — 80053 COMPREHEN METABOLIC PANEL: CPT

## 2019-11-20 PROCEDURE — 1101F PT FALLS ASSESS-DOCD LE1/YR: CPT | Mod: CPTII,S$GLB,, | Performed by: INTERNAL MEDICINE

## 2019-11-20 PROCEDURE — 99214 PR OFFICE/OUTPT VISIT, EST, LEVL IV, 30-39 MIN: ICD-10-PCS | Mod: S$GLB,,, | Performed by: INTERNAL MEDICINE

## 2019-11-20 PROCEDURE — 3077F SYST BP >= 140 MM HG: CPT | Mod: CPTII,S$GLB,, | Performed by: INTERNAL MEDICINE

## 2019-11-20 PROCEDURE — 99999 PR PBB SHADOW E&M-EST. PATIENT-LVL V: CPT | Mod: PBBFAC,,, | Performed by: INTERNAL MEDICINE

## 2019-11-20 PROCEDURE — 99999 PR PBB SHADOW E&M-EST. PATIENT-LVL V: ICD-10-PCS | Mod: PBBFAC,,, | Performed by: INTERNAL MEDICINE

## 2019-11-20 PROCEDURE — 3078F PR MOST RECENT DIASTOLIC BLOOD PRESSURE < 80 MM HG: ICD-10-PCS | Mod: CPTII,S$GLB,, | Performed by: INTERNAL MEDICINE

## 2019-11-20 PROCEDURE — 36415 COLL VENOUS BLD VENIPUNCTURE: CPT

## 2019-11-20 PROCEDURE — 99214 OFFICE O/P EST MOD 30 MIN: CPT | Mod: S$GLB,,, | Performed by: INTERNAL MEDICINE

## 2019-11-20 PROCEDURE — 1101F PR PT FALLS ASSESS DOC 0-1 FALLS W/OUT INJ PAST YR: ICD-10-PCS | Mod: CPTII,S$GLB,, | Performed by: INTERNAL MEDICINE

## 2019-11-20 RX ORDER — METOPROLOL SUCCINATE 50 MG/1
TABLET, EXTENDED RELEASE ORAL
Qty: 90 TABLET | Refills: 1 | Status: SHIPPED | OUTPATIENT
Start: 2019-11-20 | End: 2020-08-05 | Stop reason: SDUPTHER

## 2019-11-20 RX ORDER — DOXAZOSIN 8 MG/1
8 TABLET ORAL DAILY
Qty: 90 TABLET | Refills: 1 | Status: SHIPPED | OUTPATIENT
Start: 2019-11-20 | End: 2020-04-06 | Stop reason: SDUPTHER

## 2019-11-20 RX ORDER — TRIAMTERENE AND HYDROCHLOROTHIAZIDE 37.5; 25 MG/1; MG/1
1 CAPSULE ORAL EVERY MORNING
Qty: 30 CAPSULE | Refills: 1 | Status: SHIPPED | OUTPATIENT
Start: 2019-11-20 | End: 2019-11-25 | Stop reason: ALTCHOICE

## 2019-11-20 RX ORDER — CANDESARTAN 16 MG/1
16 TABLET ORAL DAILY
Qty: 90 TABLET | Refills: 1 | Status: SHIPPED | OUTPATIENT
Start: 2019-11-20 | End: 2020-06-10 | Stop reason: SDUPTHER

## 2019-11-20 RX ORDER — GLIPIZIDE 5 MG/1
5 TABLET ORAL
Qty: 30 TABLET | Refills: 4 | Status: SHIPPED | OUTPATIENT
Start: 2019-11-20 | End: 2020-06-02 | Stop reason: SDUPTHER

## 2019-11-21 ENCOUNTER — OFFICE VISIT (OUTPATIENT)
Dept: ENDOCRINOLOGY | Facility: CLINIC | Age: 69
End: 2019-11-21
Payer: MEDICARE

## 2019-11-21 ENCOUNTER — PATIENT MESSAGE (OUTPATIENT)
Dept: ENDOCRINOLOGY | Facility: CLINIC | Age: 69
End: 2019-11-21

## 2019-11-21 ENCOUNTER — LAB VISIT (OUTPATIENT)
Dept: LAB | Facility: HOSPITAL | Age: 69
End: 2019-11-21
Payer: MEDICARE

## 2019-11-21 VITALS
HEIGHT: 67 IN | WEIGHT: 240 LBS | SYSTOLIC BLOOD PRESSURE: 130 MMHG | DIASTOLIC BLOOD PRESSURE: 79 MMHG | BODY MASS INDEX: 37.67 KG/M2

## 2019-11-21 DIAGNOSIS — E55.9 VITAMIN D DEFICIENCY: ICD-10-CM

## 2019-11-21 DIAGNOSIS — E21.3 HYPERPARATHYROIDISM: ICD-10-CM

## 2019-11-21 DIAGNOSIS — E11.9 CONTROLLED TYPE 2 DIABETES MELLITUS WITHOUT COMPLICATION, WITHOUT LONG-TERM CURRENT USE OF INSULIN: Primary | ICD-10-CM

## 2019-11-21 DIAGNOSIS — Z78.0 POSTMENOPAUSAL: ICD-10-CM

## 2019-11-21 DIAGNOSIS — E83.52 HYPERCALCEMIA: Primary | ICD-10-CM

## 2019-11-21 DIAGNOSIS — E78.5 HYPERLIPIDEMIA, UNSPECIFIED HYPERLIPIDEMIA TYPE: ICD-10-CM

## 2019-11-21 DIAGNOSIS — E11.9 CONTROLLED TYPE 2 DIABETES MELLITUS WITHOUT COMPLICATION, WITHOUT LONG-TERM CURRENT USE OF INSULIN: ICD-10-CM

## 2019-11-21 DIAGNOSIS — E04.1 THYROID NODULE: ICD-10-CM

## 2019-11-21 DIAGNOSIS — I10 ESSENTIAL HYPERTENSION: ICD-10-CM

## 2019-11-21 LAB — 25(OH)D3+25(OH)D2 SERPL-MCNC: 17 NG/ML (ref 30–96)

## 2019-11-21 PROCEDURE — 3044F PR MOST RECENT HEMOGLOBIN A1C LEVEL <7.0%: ICD-10-PCS | Mod: CPTII,S$GLB,, | Performed by: NURSE PRACTITIONER

## 2019-11-21 PROCEDURE — 3078F DIAST BP <80 MM HG: CPT | Mod: CPTII,S$GLB,, | Performed by: NURSE PRACTITIONER

## 2019-11-21 PROCEDURE — 99999 PR PBB SHADOW E&M-EST. PATIENT-LVL V: ICD-10-PCS | Mod: PBBFAC,,, | Performed by: NURSE PRACTITIONER

## 2019-11-21 PROCEDURE — 99499 RISK ADDL DX/OHS AUDIT: ICD-10-PCS | Mod: S$GLB,,, | Performed by: NURSE PRACTITIONER

## 2019-11-21 PROCEDURE — 1159F PR MEDICATION LIST DOCUMENTED IN MEDICAL RECORD: ICD-10-PCS | Mod: S$GLB,,, | Performed by: NURSE PRACTITIONER

## 2019-11-21 PROCEDURE — 3044F HG A1C LEVEL LT 7.0%: CPT | Mod: CPTII,S$GLB,, | Performed by: NURSE PRACTITIONER

## 2019-11-21 PROCEDURE — 99214 OFFICE O/P EST MOD 30 MIN: CPT | Mod: S$GLB,,, | Performed by: NURSE PRACTITIONER

## 2019-11-21 PROCEDURE — 82306 VITAMIN D 25 HYDROXY: CPT

## 2019-11-21 PROCEDURE — 99499 UNLISTED E&M SERVICE: CPT | Mod: S$GLB,,, | Performed by: NURSE PRACTITIONER

## 2019-11-21 PROCEDURE — 1126F PR PAIN SEVERITY QUANTIFIED, NO PAIN PRESENT: ICD-10-PCS | Mod: S$GLB,,, | Performed by: NURSE PRACTITIONER

## 2019-11-21 PROCEDURE — 36415 COLL VENOUS BLD VENIPUNCTURE: CPT

## 2019-11-21 PROCEDURE — 99214 PR OFFICE/OUTPT VISIT, EST, LEVL IV, 30-39 MIN: ICD-10-PCS | Mod: S$GLB,,, | Performed by: NURSE PRACTITIONER

## 2019-11-21 PROCEDURE — 99999 PR PBB SHADOW E&M-EST. PATIENT-LVL V: CPT | Mod: PBBFAC,,, | Performed by: NURSE PRACTITIONER

## 2019-11-21 PROCEDURE — 1126F AMNT PAIN NOTED NONE PRSNT: CPT | Mod: S$GLB,,, | Performed by: NURSE PRACTITIONER

## 2019-11-21 PROCEDURE — 1101F PR PT FALLS ASSESS DOC 0-1 FALLS W/OUT INJ PAST YR: ICD-10-PCS | Mod: CPTII,S$GLB,, | Performed by: NURSE PRACTITIONER

## 2019-11-21 PROCEDURE — 3078F PR MOST RECENT DIASTOLIC BLOOD PRESSURE < 80 MM HG: ICD-10-PCS | Mod: CPTII,S$GLB,, | Performed by: NURSE PRACTITIONER

## 2019-11-21 PROCEDURE — 3075F PR MOST RECENT SYSTOLIC BLOOD PRESS GE 130-139MM HG: ICD-10-PCS | Mod: CPTII,S$GLB,, | Performed by: NURSE PRACTITIONER

## 2019-11-21 PROCEDURE — 3075F SYST BP GE 130 - 139MM HG: CPT | Mod: CPTII,S$GLB,, | Performed by: NURSE PRACTITIONER

## 2019-11-21 PROCEDURE — 1101F PT FALLS ASSESS-DOCD LE1/YR: CPT | Mod: CPTII,S$GLB,, | Performed by: NURSE PRACTITIONER

## 2019-11-21 PROCEDURE — 1159F MED LIST DOCD IN RCRD: CPT | Mod: S$GLB,,, | Performed by: NURSE PRACTITIONER

## 2019-11-21 NOTE — PATIENT INSTRUCTIONS
Snacks can be an important part of a balanced, healthy meal plan. They allow you to eat more frequently, feeling full and satisfied throughout the day. Also, they allow you to spread carbohydrates evenly, which may stabilize blood sugars.  Plus, snacks are enjoyable!     The amount of carbohydrate needed at snacks varies. Generally, about 15-30 grams of carbohydrate per snack is recommended.  Below you will find some tasty treats.       0-5 gm carb   Crystal Light   Vitamin Water Zero   Herbal tea, unsweetened   2 tsp peanut butter on celery   1./2 cup sugar-free jell-o   1 sugar-free popsicle   ¼ cup blueberries   8oz Blue Kia unsweetened almond milk   5 baby carrots & celery sticks, cucumbers, bell peppers dipped in ¼ cup salsa, 2Tbsp light ranch dressing or 2Tbsp plain Greek yogurt   10 Goldfish crackers   ½ oz low-fat cheese or string cheese   1 closed handful of nuts, unsalted   1 Tbsp of sunflower seeds, unsalted   1 cup Smart Pop popcorn   1 whole grain brown rice cake        15 gm carb   1 small piece of fruit or ½ banana or 1/2 cup lite canned fruit   3 debbie cracker squares   3 cups Smart Pop popcorn, top spray butter, Marcial lite salt or cinnamon and Truvia   5 Vanilla Wafers   ½ cup low fat, no added sugar ice cream or frozen yogurt (Blue bell, Blue Bunny, Weight Watchers, Skinny Cow)   ½ turkey, ham, or chicken sandwich   ½ c fruit with ½ c Cottage cheese   4-6 unsalted wheat crackers with 1 oz low fat cheese or 1 tbsp peanut butter    30-45 goldfish crackers (depending on flavor)    7-8 Hoahaoism mini brown rice cakes (caramel, apple cinnamon, chocolate)    12 Hoahaoism mini brown rice cakes (cheddar, bbq, ranch)    1/3 cup hummus dip with raw veg   1/2 whole wheat kostas, 1Tbsp hummus   Mini Pizza (1/2 whole wheat English muffin, low-fat  cheese, tomato sauce)   100 calorie snack pack (Oreo, Chips Ahoy, Ritz Mix, Baked Cheetos)   4-6 oz. light or Greek Style yogurt  (Zulma, Divya, Kavita, [facility])   ½ cup sugar-free pudding     6 in. wheat tortilla or kostas oven toasted chips (topped with spray butter flavoring, cinnamon, Truvia OR spray butter, garlic powder, chili powder)    18 BBQ Popchips (available at [employer], [employer], Fresh Market)     Diabetic drinks we recommend:   Water -BEST  Crystal light sugar free packets  Gatorade zero   Powerade zero  Tea without sweetener    Diabetic drinks we do not recommend:  Coke or any sugary regular soft drink  Coffee with sugar  Milk  Juice  Beer  Liquor    Sweeteners we recommend:  Stevia   Trhemant Thomas    Note: Caffeine can increase your blood sugar

## 2019-11-21 NOTE — LETTER
November 21, 2019      Rosemary Pearson MD  [address]  [facility] [address]           [facility] [place]  [address]  [facility] [address]  Phone: [phone]  Fax: [phone]          Patient: Maral Linn   MR Number: [id number]   YOB: 1950   Date of Visit: 11/21/2019       Dear Dr. Rosemary Pearson:    Thank you for referring Maral Linn to me for evaluation. Attached you will find relevant portions of my assessment and plan of care.    If you have questions, please do not hesitate to call me. I look forward to following Maral Linn along with you.    Sincerely,    Magdalena Pineda, NP    Enclosure  CC:  No Recipients    If you would like to receive this communication electronically, please contact [email or web address] or [phone] to request more information on [employer] access.    For providers and/or their staff who would like to refer a patient to [facility], please contact us through our one-stop-shop provider referral line, [facility], at [phone].    If you feel you have received this communication in error or would no longer like to receive these types of communications, please e-mail [email or web address]

## 2019-11-21 NOTE — ASSESSMENT & PLAN NOTE
-Primary hyperparathyroidism with markedly elevated PTH with elevated Ca and urine calcium/cr ratio of 0.013  -Cautiously replacing Vit D given hypercalcemia  -Decision made previously to monitor as not obvious surgical indications (calcium has been near 11-11.5 mg/dL when corrected for albumin which would be a surgical indication)  -Previously localized in 2011, but recent imaging in 2016 did not localize  -Repeat iPTH and CMP on RTC  -Encouraged adequate hydration  -Check Bone Density and consider surgery if needed

## 2019-11-21 NOTE — ASSESSMENT & PLAN NOTE
-A1c at goal  -Continue metformin 500 mg PO BID  -Do not start glipizide  -Continue DM diet and resume exercise.   -Refer to Diabetes Education  -Given information on low carb snacks and DM drinks

## 2019-11-21 NOTE — PROGRESS NOTES
"Subjective:      Patient ID: Maral Linn is a 69 y.o. female.    Chief Complaint:  Follow up for primary HPT, DM, and thyroid nodule.    History of Present Illness  Patient is here for follow up primary HPT, diabetes, and thyroid nodule. Last seen by Dr. Reyes in 1/2018.     With regards to hyperparathyroidism,     Initially seen by Endocrinology in 2013 for Ca level of 10.6; PTH as high as 569    2011 Sestamibi scan "the most likely location of a parathyroid adenoma would be the mid posterior level of the left thyroid lobe."  2014 Head and neck U/S "Left lobe lesion could be a parathyroid adenoma"  2016 sestimibi and neck U/S - no parathyroid adenoma identified  Last bone density 1/2017 was normal.      No history of nephrolithiasis  -Ankle fracture 2006, no history of fragility fracture.    No hx of lithium. Not on thiazide diuretic.     Has sister that had hyperparathyroidism and had parathyroidectomy. Sister has CKD, so unsure if sister had primary or tertiary HPT.      No polyuria. Nocturia x 2. No polydipsia.    No CKD. Urine Ca/Cr ratio: 0.013  Reports she is eating cheese daily.     Also had concurrent Vitamin D deficiency   -Prescribed Ergocalciferol and takes 2000 IU daily  Hysterectomy at age 44; was on estrogen in beginning but none for years  She would not be opposed to surgery if needed.     Lab Results   Component Value Date    .0 (H) 07/10/2018    CALCIUM 11.3 (H) 11/20/2019    CAION 1.30 11/09/2011    PHOS 1.5 (L) 07/10/2018     Lab Results   Component Value Date    ALBUMIN 3.8 11/20/2019 Nov 2019 corrected calcium 11.5     Ref. Range 1/5/2018 11:35 7/10/2018 14:47   Vit D, 25-Hydroxy Latest Ref Range: 30 - 96 ng/mL 18 (L) 16 (L)   Taking Vit D 2000 IU daily    With regards to the diabetes:    Diagnosed: 9/2016  Family History of Type 2 DM in brother and sister  Known complications:   DKA/HHS-none  RN-none  PN-reports tolerable, takes tylenol   Nephropathy-none  CAD-none    Current " regimen: Metformin 1000 mg PO BID  Prescribed glipizide by her PCP yesterday but has not started.     Missed doses none    Other medications tried:  none    1 # times a day testing  Log reviewed: none Oral recall 125-167  Hypoglycemic event? None   Yes, did not need assistance   Knows how to correct with 15 grams of carbs- juice, coke, or a peppermint.     Dietary recall:  Eats 2-3 meals a day.   B: eggs with toast or velazquez  L: chicken salad  Snacks: sometimes on things she shouldn't have; others fruit         Drinks: water, diet cranberry juice    Exercise - tried to stay active; was exercising but slacked off. She was doing water aerobics but needs to  her grandson    Education - last visit: yes; would like to go again     Has a Medic alert tag? No; would like    Diabetes Management Status  Statin: Not taking; controlled  ACE/ARB: Taking    Lab Results   Component Value Date    HGBA1C 6.7 (H) 11/15/2019    HGBA1C 8.0 (H) 02/22/2019    HGBA1C 6.6 (H) 04/27/2018     Screening or Prevention Patient's value Goal Complete/Controlled?   HgA1C Testing and Control   Lab Results   Component Value Date    HGBA1C 6.7 (H) 11/15/2019      Annually/Less than 8% Yes   Lipid profile : 02/22/2019 Annually Yes   LDL control Lab Results   Component Value Date    LDLCALC 99.2 02/22/2019    Annually/Less than 100 mg/dl  Yes   Nephropathy screening Lab Results   Component Value Date    LABMICR 14.0 02/22/2019     Lab Results   Component Value Date    PROTEINUA Negative 10/02/2012    Annually Yes   Blood pressure BP Readings from Last 1 Encounters:   11/21/19 130/79    Less than 140/90 No   Dilated retinal exam : 02/13/2019 Annually Yes   Foot exam   : 07/24/2019 Annually Yes      With regards to thyroid nodule,     Thyroid US Oct 2016  THYROID GLAND has homogenous echotexture.  Vascularity is normal.  RIGHT LOBE of the thyroid measures: 4.66 x 1.30 x 1.52 cm.  0.96 x 0.71 x 0.71 cm heterogeneous, predominately hypoechoic nodule  is seen in the right inferior pole. This nodule has > 50% microcystic change consistent with a spongiform nodule. Margins are well defined. No microcalcifications are seen. Vascularity is grade 1.   ISTHMUS measures  0.28  cm in AP diameter.  LEFT LOBE measures: 4.30 x 1.27 x 1.42 cm  Three subcentimeter cysts are again seen in the left lobe of the thyroid.  No distinct nodules are seen.   LYMPH NODES: No abnormal lymph nodes seen.  COMPARISON: neck ultrasound dated 02/10/2015. The right lobe thyroid nodule has not changed significantly in size or features compared to the prior study.    No difficulty breathing swallowing   No voice changes  No FH of thyroid cancer  No personal history of radiation treatment or exposure     No signs or symptoms of hyper or hypothyroidism    No weight changes  No bowel changes  No heat or cold intolerance   No hair nail or skin changes  No cp, palpations or sob    Lab Results   Component Value Date    TSH 2.463 02/22/2019     Review of Systems   Constitutional: Negative for chills, fatigue and fever.   Eyes: Negative for visual disturbance.   Respiratory: Negative for shortness of breath.    Cardiovascular: Negative for chest pain.   Gastrointestinal: Negative for abdominal pain and nausea.   Musculoskeletal: Negative for arthralgias.   Skin: Negative for wound.   Neurological: Negative for headaches.   Hematological: Does not bruise/bleed easily.   Psychiatric/Behavioral: Negative for sleep disturbance.       Objective:   Physical Exam   Constitutional: She appears well-developed.   Neck: No thyromegaly present.   Cardiovascular: Normal rate.   Pulmonary/Chest: Effort normal.   Abdominal: Soft.   Nursing note and vitals reviewed.  Diabetes Foot Exam:   Feet no cuts or  scratches  Shoes appropriate  Foot exam done in July 2019    Lab Review:   Results for ALAN VARGAS (MRN [id number]) as of 11/21/2019 07:05   Ref. Range 11/20/2019 14:57   Sodium Latest Ref Range: 136 - 145 mmol/L 140    Potassium Latest Ref Range: 3.5 - 5.1 mmol/L 4.0   Chloride Latest Ref Range: 95 - 110 mmol/L 107   CO2 Latest Ref Range: 23 - 29 mmol/L 27   Anion Gap Latest Ref Range: 8 - 16 mmol/L 6 (L)   BUN, Bld Latest Ref Range: 8 - 23 mg/dL 19   Creatinine Latest Ref Range: 0.5 - 1.4 mg/dL 1.1   eGFR if non African American Latest Ref Range: >60 mL/min/1.73 m^2 51 (A)   eGFR if African American Latest Ref Range: >60 mL/min/1.73 m^2 59 (A)   Glucose Latest Ref Range: 70 - 110 mg/dL 96   Calcium Latest Ref Range: 8.7 - 10.5 mg/dL 11.3 (H)   Alkaline Phosphatase Latest Ref Range: 55 - 135 U/L 89   PROTEIN TOTAL Latest Ref Range: 6.0 - 8.4 g/dL 7.2   Albumin Latest Ref Range: 3.5 - 5.2 g/dL 3.8   BILIRUBIN TOTAL Latest Ref Range: 0.1 - 1.0 mg/dL 0.3   AST Latest Ref Range: 10 - 40 U/L 14   ALT Latest Ref Range: 10 - 44 U/L 15     Results for ALAN VARGAS (MRN [id number]) as of 1/5/2018 07:38   Ref. Range 10/14/2016 08:53   Calcium, Urine Latest Ref Range: 0.0 - 15.0 mg/dL 14.5   Calcium, 24H Urine Latest Ref Range: 4 - 12 mg/Hr 10   CA Urine (mg/Spec) Latest Units: mg/Spec 236   Creatinine, Ur (mg/spec) Latest Units: mg/Spec 1352.9       BONE MINERAL DENSITY RESULTS 1/3/2017:  Lumbar Spine: Lumbar bone mineral density L1-L4 is 1.359g/cm2, which is a t-score of 2.8. The z-score is 4.1.    Total Hip: The total hip bone mineral density is 1.031g/cm2.  The t-score is 0.7, and the z-score is 1.1.  Femoral neck BMD is 0.852g/cm2 and the t-score is 0.0.    Lt Forearm: Lt Forearm bone mineral density is 0.628g/cm2, which is a t-score of 0.9 and the z-score is 2.7.    COMPARISONS:  Date Location BMD T-score  06/05/14 L-spine 1.286 2.2  Total Hip 1.020 0.6    Assessment:     1. Hypercalcemia    2. Hyperparathyroidism    3. Thyroid nodule    4. Controlled type 2 diabetes mellitus without complication, without long-term current use of insulin    5. Vitamin D deficiency    6. Essential hypertension    7. Hyperlipidemia, unspecified  hyperlipidemia type    8. Postmenopausal        Plan:     Hyperparathyroidism  -Primary hyperparathyroidism with markedly elevated PTH with elevated Ca and urine calcium/cr ratio of 0.013  -Cautiously replacing Vit D given hypercalcemia  -Decision made previously to monitor as not obvious surgical indications (calcium has been near 11-11.5 mg/dL when corrected for albumin which would be a surgical indication)  -Previously localized in 2011, but recent imaging in 2016 did not localize  -Repeat iPTH and CMP on RTC  -Encouraged adequate hydration  -Check Bone Density and consider surgery if needed    Thyroid nodule  -sub cm thyroid nodule not meeting criteria for bx  -Check thyroid US       Controlled type 2 diabetes mellitus without complication, without long-term current use of insulin  -A1c at goal  -Continue metformin 500 mg PO BID  -Do not start glipizide  -Continue DM diet and resume exercise.   -Refer to Diabetes Education  -Given information on low carb snacks and DM drinks        Vitamin D deficiency  -Will check Vit D today and cautiously replace so as to not worsen hypercalcemia    Hypertension  -- on ARB  -- Controlled  -- Blood pressure goals discussed with patient      Hyperlipidemia  Controlled   Not on statin per ADA recommendations; defer to PCP        Magdalena Pineda NP

## 2019-11-23 ENCOUNTER — TELEPHONE (OUTPATIENT)
Dept: INTERNAL MEDICINE | Facility: CLINIC | Age: 69
End: 2019-11-23

## 2019-11-23 NOTE — TELEPHONE ENCOUNTER
Please contact patient and inform her that her calcium is elevated.  I will need to have her discontinue the triamterene hydrochlorothiazide, because this may aggravate that condition.  Would like to replace triamterene hydrochlorothiazide with furosemide.  Please ask her what pharmacy she would like that sent to

## 2019-11-24 NOTE — PROGRESS NOTES
Subjective:       Patient ID: Maral Linn is a 69 y.o. female.    Chief Complaint: Hypertension    HPI  She returns for management of hypertension.  She has had hypertension for over a year.  Current treatment has included medications outlined in medication list.  She denies chest pain or shortness of breath.  No palpitations.  Denies left arm or neck pain.    Medications:  See med list    Social history:  Does not smoke, does not drink alcohol      Review of Systems   Constitutional: Negative for chills, fatigue, fever and unexpected weight change.   Respiratory: Negative for chest tightness and shortness of breath.    Cardiovascular: Negative for chest pain and palpitations.   Gastrointestinal: Negative for abdominal pain and blood in stool.   Neurological: Negative for dizziness, syncope, numbness and headaches.       Objective:      Physical Exam   HENT:   Right Ear: External ear normal.   Left Ear: External ear normal.   Nose: Nose normal.   Mouth/Throat: Oropharynx is clear and moist.   Eyes: Pupils are equal, round, and reactive to light.   Neck: Normal range of motion.   Cardiovascular: Normal rate and regular rhythm.   No murmur heard.  Pulmonary/Chest: Breath sounds normal.   Abdominal: She exhibits no distension. There is no hepatosplenomegaly. There is no tenderness.   Lymphadenopathy:     She has no cervical adenopathy.     She has no axillary adenopathy.   Neurological: She has normal strength and normal reflexes. No cranial nerve deficit or sensory deficit.       Assessment/Plan     assessment and plan:  1.  Hypertension:  Discontinue Plendil.  Start Dyazide 1 p.o. daily.  Return to clinic in 1 month blood pressure check  2.  Diabetes:  She complains that metformin causes diarrhea.  Discontinue metformin.  Start Glucotrol 5 mg daily.  Check CMP  3.  Discussed Pap smear, pelvic exam.  She declined all

## 2019-11-25 RX ORDER — FELODIPINE 10 MG/1
TABLET, EXTENDED RELEASE ORAL
Qty: 90 TABLET | Refills: 1 | OUTPATIENT
Start: 2019-11-25

## 2019-11-25 RX ORDER — FUROSEMIDE 40 MG/1
40 TABLET ORAL DAILY
Qty: 30 TABLET | Refills: 1 | Status: SHIPPED | OUTPATIENT
Start: 2019-11-25 | End: 2020-01-22

## 2019-11-25 NOTE — TELEPHONE ENCOUNTER
Pt informed that her cholesterol is elevated and to stop the triameterene hydrochlorothiazide. Also informed pt it will be replaced with furosemide. Pt verbally understood. Please send rx to [facility] in [place].

## 2019-12-04 ENCOUNTER — HOSPITAL ENCOUNTER (OUTPATIENT)
Dept: RADIOLOGY | Facility: CLINIC | Age: 69
Discharge: HOME | End: 2019-12-04
Attending: NURSE PRACTITIONER
Payer: MEDICARE

## 2019-12-04 DIAGNOSIS — Z78.0 POSTMENOPAUSAL: ICD-10-CM

## 2019-12-04 DIAGNOSIS — E21.3 HYPERPARATHYROIDISM: ICD-10-CM

## 2019-12-04 PROCEDURE — 77080 DXA BONE DENSITY AXIAL: CPT | Mod: TC

## 2019-12-04 PROCEDURE — 77080 DXA BONE DENSITY AXIAL: CPT | Mod: 26,,, | Performed by: INTERNAL MEDICINE

## 2019-12-04 PROCEDURE — 77080 DEXA BONE DENSITY SPINE HIP: ICD-10-PCS | Mod: 26,,, | Performed by: INTERNAL MEDICINE

## 2019-12-12 ENCOUNTER — PATIENT MESSAGE (OUTPATIENT)
Dept: ENDOCRINOLOGY | Facility: CLINIC | Age: 69
End: 2019-12-12

## 2019-12-13 ENCOUNTER — HOSPITAL ENCOUNTER (OUTPATIENT)
Dept: ENDOCRINOLOGY | Facility: CLINIC | Age: 69
Discharge: HOME | End: 2019-12-13
Attending: NURSE PRACTITIONER
Payer: MEDICARE

## 2019-12-13 DIAGNOSIS — E04.1 THYROID NODULE: ICD-10-CM

## 2019-12-13 PROCEDURE — 76536 US SOFT TISSUE HEAD NECK THYROID: ICD-10-PCS | Mod: S$GLB,,, | Performed by: INTERNAL MEDICINE

## 2019-12-13 PROCEDURE — 76536 US EXAM OF HEAD AND NECK: CPT | Mod: S$GLB,,, | Performed by: INTERNAL MEDICINE

## 2019-12-16 ENCOUNTER — PATIENT MESSAGE (OUTPATIENT)
Dept: ENDOCRINOLOGY | Facility: CLINIC | Age: 69
End: 2019-12-16

## 2019-12-18 ENCOUNTER — CLINICAL SUPPORT (OUTPATIENT)
Dept: DIABETES | Facility: CLINIC | Age: 69
End: 2019-12-18
Payer: MEDICARE

## 2019-12-18 DIAGNOSIS — E11.9 CONTROLLED TYPE 2 DIABETES MELLITUS WITHOUT COMPLICATION, WITHOUT LONG-TERM CURRENT USE OF INSULIN: ICD-10-CM

## 2019-12-18 PROCEDURE — G0108 PR DIAB MANAGE TRN  PER INDIV: ICD-10-PCS | Mod: S$GLB,,, | Performed by: DIETITIAN, REGISTERED

## 2019-12-18 PROCEDURE — 99999 PR PBB SHADOW E&M-EST. PATIENT-LVL I: CPT | Mod: PBBFAC,,, | Performed by: DIETITIAN, REGISTERED

## 2019-12-18 PROCEDURE — 99999 PR PBB SHADOW E&M-EST. PATIENT-LVL I: ICD-10-PCS | Mod: PBBFAC,,, | Performed by: DIETITIAN, REGISTERED

## 2019-12-18 PROCEDURE — G0108 DIAB MANAGE TRN  PER INDIV: HCPCS | Mod: S$GLB,,, | Performed by: DIETITIAN, REGISTERED

## 2019-12-19 NOTE — PROGRESS NOTES
Diabetes Education  Author: Edith Sierra RD, CDE  Date: 12/18/2019    Diabetes Care Management Summary  Diabetes Education Record Assessment: Initial    Current Diabetes Risk Level: Low     Diabetes Type : Type II  Diabetes Diagnosis: 3-5 years (dx 2016)      Current Treatment: Oral Medication  taking 1000 mg metformin nightly, and 5 mg glipizide each morning            Reviewed Problem List with Patient: Yes    Health Maintenance was reviewed today with patient. Discussed with patient importance of routine eye exams, foot exams/foot care, blood work (i.e.: A1c, microalbumin, and lipid), dental visits, yearly flu vaccine, and pneumonia vaccine as indicated by PCP. Patient verbalized understanding.     Health Maintenance Topics with due status: Not Due       Topic Last Completion Date    Colonoscopy 07/10/2012    TETANUS VACCINE 06/20/2018    Eye Exam 02/13/2019    Mammogram 02/22/2019    Lipid Panel 02/22/2019    Foot Exam 07/24/2019    Hemoglobin A1c 11/15/2019    DEXA SCAN 12/04/2019     Health Maintenance Due   Topic Date Due    Low Dose Statin  01/26/1971       Nutrition  Meal Planning: 3 meals per day  What type of beverages do you drink?: diet soda/tea, water    Monitoring   Self Monitoring : (SMBG several times weekly; -146)  Blood Glucose Logs: No  Do you use a personal continuous glucose monitor?: No  In the last month, how often have you had a low blood sugar reaction?: never    Exercise   Exercise Type: none (used to do water aerobics 2-3 times weekly but hasn't been in a while)    Social History  Preferred Learning Method: Face to Face, Demonstration, Hands On, Reading Materials  Primary Support: Self  Smoking Status: Ex Smoker  Barriers to Change: None  Learning Challenges : None  Readiness to Learn : Acceptance  Cultural Influences: No        Diabetes Education Assessment/Progress  -Diabetes Disease Process (diabetes disease process and treatment options): Discussion, Instructed, Individual  Session, Written Materials Provided, Comprehends Key Points  Reviewed disease process and general progression. Discussed pt's most likely cause of recently elevated Hgb A1c. Reviewed current treatment plan and discussed all treatment options available, especially dietary and lifestyle modifications, as well as medication additions and/or changes.    -Nutrition (Incorporating nutritional management into one's lifestyle): Discussion, Instructed, Individual Session, Written Materials Provided, Comprehends Key Points  Reviewed all carb vs non-carb containing foods and discussed the appropriate amounts of carb to have at meals vs snacks. Recommended 30-45 gm carb at meals and <15 gm carb at snacks. Reviewed label reading and how to determine appropriate serving sizes of specific carb containing foods. Reviewed need to limit total/saturated fats despite lesser effect on BG increase. Encouraged carb sources primarily from whole grains, fresh/frozen fruits, and low-fat milk and yogurt. Discussed meal plans and snack ideas amenable to pt.     -Physical Activity (incorporating physical activity into one's lifestyle): Discussion, Instructed, Individual Session, Written Materials Provided, Comprehends Key Points  Discussed goals and benefits of regular PA. Reviewed difference between active lifestyle and structured physical activity. Encouraged PA with increased heart rate for sustained duration as tolerated. Reviewed PA goals of >150 minutes weekly.     -Medications (states correct name, dose, onset, peak, duration, side effects & timing of meds): Discussion, Instructed, Individual Session, Written Materials Provided, Comprehends Key Points  Discussed timing and MOA of metformin and glipizide. Discussed possible side effects and how to avoid these.  Reviewed that pt is not supposed to be taking glipizide (per last endo note). Discussed current meds with [facility] Pineda - per her: instructed pt to take 1000 mg metformin at night  only - stop glipizide.     -Monitoring (monitoring blood glucose/other parameters & using results): Discussion, Instructed, Individual Session, Written Materials Provided, Comprehends Key Points  Discussed goal BGs for different times of day and in relation to meals. Instructed pt to test BG daily: fasting vs 2-hours after any meal. Reviewed need for updated BG logs for all endo, PCP, and education appts.    -Acute Complications (preventing, detecting, and treating acute complications): Discussion, Instructed, Individual Session, Written Materials Provided, Comprehends Key Points  Discussed hypoglycemia vs hyperglycemia symptoms and discussed appropriate treatments for each. Reviewed that pt is at some risk of hypo with current medication regimen - to be changed today. Discussed general vs severe hyperglycemia and risk of DKA.    -Chronic Complications (preventing, detecting, and treating chronic complications): Discussion, Instructed, Individual Session, Written Materials Provided, Comprehends Key Points  Reviewed annual diabetes care schedule and patient priorities.    -Clinical (diabetes, other pertinent medical history, and relevant comorbidities reviewed during visit): Discussion    -Cognitive (knowledge of self-management skills, functional health literacy): Individual Session  Arrives with general health management knowledge - with some specific knowledge of diabetes management. Leaves with increased knowledge base - will benefit from f/u annually.    -Psychosocial (emotional response to diabetes): Individual Session  No negative feelings toward diabetes dx or disease management noted.    -Diabetes Distress and Support Systems: Individual Session  No distress noted.     -Behavioral (readiness for change, lifestyle practices, self-care behaviors): Individual Session  Appears motivated to continue practicing positive self-care behaviors.          Goals  Patient has selected/evaluated goals during today's  session: Yes, selected    Medications: Set (Take meds as instructed!)  Start Date: 12/18/19  Target Date: 12/18/20         Diabetes Care Plan/Intervention  Education Plan/Intervention: Individual Follow-Up DSMT      Diabetes Meal Plan  Restrictions: Restricted Carbohydrate        Today's Self-Management Care Plan was developed with the patient's input and is based on barriers identified during today's assessment.    The long and short-term goals in the care plan were written with the patient/caregiver's input. The patient has agreed to work toward these goals to improve her overall diabetes control.      The patient received a copy of today's self-management plan and verbalized understanding of the care plan, goals, and all of today's instructions.      The patient was encouraged to communicate with her physician and care team regarding her condition(s) and treatment.  I provided the patient with my contact information today and encouraged her to contact me via phone or patient portal as needed.         Education Units of Time   Time Spent: 60 min

## 2020-01-06 ENCOUNTER — OFFICE VISIT (OUTPATIENT)
Dept: URGENT CARE | Facility: CLINIC | Age: 70
End: 2020-01-06
Payer: MEDICARE

## 2020-01-06 ENCOUNTER — HOSPITAL ENCOUNTER (OUTPATIENT)
Dept: RADIOLOGY | Facility: CLINIC | Age: 70
Discharge: HOME | End: 2020-01-06
Attending: PHYSICIAN ASSISTANT
Payer: MEDICARE

## 2020-01-06 VITALS
OXYGEN SATURATION: 98 % | TEMPERATURE: 98 F | BODY MASS INDEX: 37.43 KG/M2 | SYSTOLIC BLOOD PRESSURE: 130 MMHG | HEART RATE: 56 BPM | DIASTOLIC BLOOD PRESSURE: 80 MMHG | WEIGHT: 239 LBS

## 2020-01-06 DIAGNOSIS — M79.675 TOE PAIN, LEFT: Primary | ICD-10-CM

## 2020-01-06 DIAGNOSIS — R52 PAIN: ICD-10-CM

## 2020-01-06 DIAGNOSIS — S90.122A CONTUSION OF LESSER TOE OF LEFT FOOT WITHOUT DAMAGE TO NAIL, INITIAL ENCOUNTER: ICD-10-CM

## 2020-01-06 PROCEDURE — 73630 XR FOOT COMPLETE 3 VIEW LEFT: ICD-10-PCS | Mod: LT,S$GLB,, | Performed by: RADIOLOGY

## 2020-01-06 PROCEDURE — 73630 X-RAY EXAM OF FOOT: CPT | Mod: LT,S$GLB,, | Performed by: RADIOLOGY

## 2020-01-06 PROCEDURE — 99214 PR OFFICE/OUTPT VISIT, EST, LEVL IV, 30-39 MIN: ICD-10-PCS | Mod: S$GLB,,, | Performed by: PHYSICIAN ASSISTANT

## 2020-01-06 PROCEDURE — 99214 OFFICE O/P EST MOD 30 MIN: CPT | Mod: S$GLB,,, | Performed by: PHYSICIAN ASSISTANT

## 2020-01-06 NOTE — PATIENT INSTRUCTIONS
Rest, apply ice intermittently, compress with ace wrap and elevate as much as possible.    You can continue taking Ibuprofen every 6-8 hours as needed for pain control.    You will need to follow-up with orthopedics if your symptoms persist, as we discussed.        R.I.C.E.    R.I.C.E. stands for Rest, Ice, Compression, and Elevation. Doing these things helps limit pain and swelling after an injury. R.I.C.E. also helps injuries heal faster. Use R.I.C.E. for sprains, strains, and severe bruises or bumps. Follow the tips on this handout and begin R.I.C.E. as soon as possible after an injury.  ? Rest  Pain is your bodys way of telling you to rest an injured area. Whether you have hurt an elbow, hand, foot, or knee, limiting its use will prevent further injury and help you heal.  ? Ice  Applying ice right after an injury helps prevent swelling and reduce pain. Dont place ice directly on your skin.  · Wrap a cold pack or bag of ice in a thin cloth. Place it over the injured area.  · Ice for 10 minutes every 3 hours. Dont ice for more than 20 minutes at a time.  ? Compression  Putting pressure (compression) on an injury helps prevent swelling and provides support.  · Wrap the injured area firmly with an elastic bandage. If your hand or foot tingles, becomes discolored, or feels cold to the touch, the bandage may be too tight. Rewrap it more loosely.  · If your bandage becomes too loose, rewrap it.  · Do not wear an elastic bandage overnight.  ? Elevation  Keeping an injury elevated helps reduce swelling, pain, and throbbing. Elevation is most effective when the injury is kept elevated higher than the heart.     Call your healthcare provider if you notice any of the following:  · Fingers or toes feel numb, are cold to the touch, or change color  · Skin looks shiny or tight  · Pain, swelling, or bruising worsens and is not improved with elevation   Date Last Reviewed: 9/3/2015  © 9542-3800 [employer] [address]  [place] [address]. All rights reserved. This information is not intended as a substitute for professional medical care. Always follow your healthcare professional's instructions.        Lower Extremity Contusion  You have a contusion (bruise) of a lower extremity (leg, knee, ankle, foot, or toe). Symptoms include pain, swelling, and skin discoloration. No bones are broken. This injury may take from a few days to a few weeks to heal.  During that time, the bruise may change from reddish in color, to purple-blue, to green-yellow, to yellow-brown.  Home care  · Unless another medicine was prescribed, you can take acetaminophen, ibuprofen, or naproxen to control pain. (If you have chronic liver or kidney disease or ever had a stomach ulcer or gastrointestinal bleeding, talk with your doctor before using these medicines.)  · Elevate the injured area to reduce pain and swelling. As much as possible, sit or lie down with the injured area raised about the level of your heart. This is especially important during the first 48 hours.  · Ice the injured area to help reduce pain and swelling. Wrap a cold source (ice pack or ice cubes in a plastic bag) in a thin towel. Apply to the bruised area for 20 minutes every 1 to 2 hours the first day. Continue this 3 to 4 times a day until the pain and swelling goes away.  · If crutches have been advised, do not bear full weight on the injured leg until you can do so without pain. You may return to sports when you are able to put full weight and impact on the injured leg without pain.  Follow up  Follow up with your healthcare provider or our staff as advised. Call if you are not improving within the next 1 to 2 weeks.  When to seek medical advice   Call your healthcare provider right away if any of these occur:  · Increased pain or swelling  · Foot or toes become cold, blue, numb or tingly  · Signs of infection: Warmth, drainage, or increased redness or pain around  the injury  · Inability to move the injured area   · Frequent bruising for unknown reasons  Date Last Reviewed: 2/1/2017  © 0678-3172 [employer]. [address] [place] [address], [place] [address]. All rights reserved. This information is not intended as a substitute for professional medical care. Always follow your healthcare professional's instructions.

## 2020-01-06 NOTE — PROGRESS NOTES
Subjective:       Patient ID: Maral Linn is a 69 y.o. female.    Vitals:  weight is 108.4 kg (239 lb). Her oral temperature is 98.1 °F (36.7 °C). Her blood pressure is 130/80 and her pulse is 56 (abnormal). Her oxygen saturation is 98%.     Chief Complaint: Toe Pain    Toe Pain    The incident occurred 6 to 12 hours ago. The injury mechanism was a direct blow. The pain is present in the left foot. The quality of the pain is described as burning. The pain is at a severity of 7/10. The pain is moderate. The pain has been constant since onset. She reports no foreign bodies present. The symptoms are aggravated by movement. She has tried acetaminophen for the symptoms. The treatment provided no relief.       Constitution: Negative for fatigue.   HENT: Negative for facial swelling and facial trauma.    Neck: Negative for neck stiffness.   Cardiovascular: Negative for chest trauma.   Eyes: Negative for eye trauma, double vision and blurred vision.   Gastrointestinal: Negative for abdominal trauma, abdominal pain and rectal bleeding.   Genitourinary: Negative for hematuria, missed menses, genital trauma and pelvic pain.   Musculoskeletal: Positive for pain, trauma and joint swelling. Negative for abnormal ROM of joint.   Skin: Negative for color change, wound, abrasion, laceration, erythema and bruising.   Neurological: Negative for dizziness, history of vertigo, light-headedness, coordination disturbances, altered mental status and loss of consciousness.   Hematologic/Lymphatic: Negative for history of bleeding disorder.   Psychiatric/Behavioral: Negative for altered mental status.       Objective:      Physical Exam   Constitutional: She is oriented to person, place, and time. Vital signs are normal. She appears well-developed and well-nourished. She is cooperative.  Non-toxic appearance. She does not have a sickly appearance. She does not appear ill. No distress.   HENT:   Head: Normocephalic and atraumatic.   Right  Ear: Tympanic membrane, external ear and ear canal normal.   Left Ear: Tympanic membrane, external ear and ear canal normal.   Nose: Nose normal.   Mouth/Throat: Uvula is midline, oropharynx is clear and moist and mucous membranes are normal.   Eyes: Pupils are equal, round, and reactive to light. Conjunctivae, EOM and lids are normal.   Neck: Trachea normal, normal range of motion, full passive range of motion without pain and phonation normal. Neck supple.   Cardiovascular: Normal rate, regular rhythm, normal heart sounds and intact distal pulses.   Pulmonary/Chest: Effort normal and breath sounds normal. No stridor. No respiratory distress. She has no decreased breath sounds. She has no wheezes. She has no rhonchi. She has no rales.   Abdominal: Soft. Bowel sounds are normal. She exhibits no distension and no mass. There is no tenderness. There is no guarding.   Musculoskeletal: Normal range of motion.        Feet:    Neurological: She is alert and oriented to person, place, and time.   Skin: Skin is warm, dry, intact and no rash. Capillary refill takes less than 2 seconds. bruising, erythema and ecchymosis  Psychiatric: She has a normal mood and affect. Her behavior is normal. Judgment and thought content normal.   Nursing note and vitals reviewed.  X-ray Foot Complete 3 View Left    Result Date: 1/6/2020  EXAMINATION: XR FOOT COMPLETE 3 VIEW LEFT CLINICAL HISTORY: .  Pain, unspecified TECHNIQUE: AP, lateral and oblique views of the left foot were performed. COMPARISON: 10/19/2006 FINDINGS: Old healed fracture deformity involving the distal 4th left metatarsal.  Scattered multi articular degenerative changes including at the 1st MTP joint.  No acute fracture or dislocation or soft tissue swelling.     As above Electronically signed by: Marcos Kyle MD Date:    01/06/2020 Time:    12:51      Assessment:       1. Toe pain, left    2. Contusion of lesser toe of left foot without damage to nail, initial encounter         Plan:         Toe pain, left  -     X-Ray Foot Complete 3 view Left; Future; Expected date: 01/06/2020  -     Ambulatory referral to Podiatry    Contusion of lesser toe of left foot without damage to nail, initial encounter    Xray left foot unrevealing for acute fracture or dislocation  Discussed RICE therapy and NSAIDs for pain control  Patient has walking boot from prior fracture     I have discussed the diagnosis, treatment plan and recommendations for follow-up with podiatry if no improvement and patient verbalized understanding and is agreeable to the plan. ED precautions given. AVS printed and given to patient upon discharge with information regarding this visit. All questions were addressed prior to discharge.    Yumiko Motley PA-C

## 2020-01-13 ENCOUNTER — OFFICE VISIT (OUTPATIENT)
Dept: PODIATRY | Facility: CLINIC | Age: 70
End: 2020-01-13
Payer: MEDICARE

## 2020-01-13 VITALS — HEIGHT: 67 IN | WEIGHT: 239 LBS | BODY MASS INDEX: 37.51 KG/M2

## 2020-01-13 DIAGNOSIS — M79.675 PAIN OF TOE OF LEFT FOOT: Primary | ICD-10-CM

## 2020-01-13 PROCEDURE — 99999 PR PBB SHADOW E&M-EST. PATIENT-LVL IV: ICD-10-PCS | Mod: PBBFAC,,, | Performed by: PODIATRIST

## 2020-01-13 PROCEDURE — 99999 PR PBB SHADOW E&M-EST. PATIENT-LVL IV: CPT | Mod: PBBFAC,,, | Performed by: PODIATRIST

## 2020-01-13 PROCEDURE — 1159F MED LIST DOCD IN RCRD: CPT | Mod: S$GLB,,, | Performed by: PODIATRIST

## 2020-01-13 PROCEDURE — 1101F PT FALLS ASSESS-DOCD LE1/YR: CPT | Mod: CPTII,S$GLB,, | Performed by: PODIATRIST

## 2020-01-13 PROCEDURE — 1125F PR PAIN SEVERITY QUANTIFIED, PAIN PRESENT: ICD-10-PCS | Mod: S$GLB,,, | Performed by: PODIATRIST

## 2020-01-13 PROCEDURE — 1159F PR MEDICATION LIST DOCUMENTED IN MEDICAL RECORD: ICD-10-PCS | Mod: S$GLB,,, | Performed by: PODIATRIST

## 2020-01-13 PROCEDURE — 1125F AMNT PAIN NOTED PAIN PRSNT: CPT | Mod: S$GLB,,, | Performed by: PODIATRIST

## 2020-01-13 PROCEDURE — 99213 PR OFFICE/OUTPT VISIT, EST, LEVL III, 20-29 MIN: ICD-10-PCS | Mod: S$GLB,,, | Performed by: PODIATRIST

## 2020-01-13 PROCEDURE — 1101F PR PT FALLS ASSESS DOC 0-1 FALLS W/OUT INJ PAST YR: ICD-10-PCS | Mod: CPTII,S$GLB,, | Performed by: PODIATRIST

## 2020-01-13 PROCEDURE — 99213 OFFICE O/P EST LOW 20 MIN: CPT | Mod: S$GLB,,, | Performed by: PODIATRIST

## 2020-01-13 RX ORDER — TRIAMTERENE AND HYDROCHLOROTHIAZIDE 37.5; 25 MG/1; MG/1
CAPSULE ORAL
Qty: 30 CAPSULE | Refills: 1 | OUTPATIENT
Start: 2020-01-13

## 2020-01-13 NOTE — PROGRESS NOTES
Chief Complaint   Patient presents with    Follow-up     Left foot pain           HPI:   Maral Linn is a 69 y.o. female with complaints of  left 3rd toe pain.  Stubbed her toe and went to urgent care on 1/6/2020.  Xrays done and unremarkable.  She was placed in CAM boot.   She is resting and icing her foot.    No adverse interval changes.           Patient Active Problem List   Diagnosis    Unspecified essential hypertension    Hip pain, left    At risk for falling    Lumbar spinal stenosis    Lumbosacral neuritis    LBP (low back pain)    Hyperparathyroidism    Controlled type 2 diabetes mellitus without complication, without long-term current use of insulin    Obesity (BMI 30-39.9)    Hyperlipidemia    Hypertension    Vitamin D deficiency    Thyroid nodule    Hypophosphatemia    Chronic neck pain           Current Outpatient Medications on File Prior to Visit   Medication Sig Dispense Refill    blood sugar diagnostic Strp 1 each by Misc.(Non-Drug; Combo Route) route once daily. 100 strip 3    blood-glucose meter kit Use as instructed 1 each 0    candesartan (ATACAND) 16 MG tablet Take 1 tablet (16 mg total) by mouth once daily. 90 tablet 1    cholecalciferol, vitamin D3, 1,000 unit capsule Take 1 capsule (1,000 Units total) by mouth once daily.  0    doxazosin (CARDURA) 8 MG Tab Take 1 tablet (8 mg total) by mouth once daily. 90 tablet 1    fluticasone (FLONASE) 50 mcg/actuation nasal spray 2 Spray, Suspension Nasal Every day      FLUZONE HIGH-DOSE 2019-20, PF, 180 mcg/0.5 mL Syrg       furosemide (LASIX) 40 MG tablet Take 1 tablet (40 mg total) by mouth once daily. 30 tablet 1    glipiZIDE (GLUCOTROL) 5 MG tablet Take 1 tablet (5 mg total) by mouth daily with breakfast. 30 tablet 4    lancets 33 gauge Misc 1 lancet by Misc.(Non-Drug; Combo Route) route once daily. 100 each 3    metFORMIN (GLUCOPHAGE) 1000 MG tablet Take 1 tablet (1,000 mg total) by mouth 2 (two) times daily with  meals. 180 tablet 1    metoprolol succinate (TOPROL-XL) 50 MG 24 hr tablet TAKE 1 TABLET(50 MG) BY MOUTH EVERY DAY 90 tablet 1    mupirocin (BACTROBAN) 2 % ointment Apply to affected area 3 times daily 22 g 0    PNEUMOVAX 23 25 mcg/0.5 mL       potassium chloride (K-TAB) 20 mEq Take 1 tablet (20 mEq total) by mouth once daily. 30 tablet 5    promethazine-dextromethorphan (PROMETHAZINE-DM) 6.25-15 mg/5 mL Syrp Take one tsp po q 6 hrs prn cough 180 mL 0    potassium phosphate, monobasic, (K-PHOS) 500 mg TbSO Take 1 tablet (500 mg total) by mouth once daily. 30 tablet 2     No current facility-administered medications on file prior to visit.            Review of patient's allergies indicates:  No Known Allergies        Social History     Socioeconomic History    Marital status:      Spouse name: Not on file    Number of children: Not on file    Years of education: Not on file    Highest education level: Not on file   Occupational History    Not on file   Social Needs    Financial resource strain: Not on file    Food insecurity:     Worry: Not on file     Inability: Not on file    Transportation needs:     Medical: Not on file     Non-medical: Not on file   Tobacco Use    Smoking status: Former Smoker     Last attempt to quit: 1983     Years since quittin.0    Smokeless tobacco: Never Used   Substance and Sexual Activity    Alcohol use: Yes     Comment: rarely    Drug use: No    Sexual activity: Never     Birth control/protection: None   Lifestyle    Physical activity:     Days per week: Not on file     Minutes per session: Not on file    Stress: Not on file   Relationships    Social connections:     Talks on phone: Not on file     Gets together: Not on file     Attends [employer] service: Not on file     Active member of club or organization: Not on file     Attends meetings of clubs or organizations: Not on file     Relationship status: Not on file   Other Topics Concern    Are  "you pregnant or think you may be? Not Asked    Breast-feeding Not Asked   Social History Narrative    Not on file             ROS:   General ROS: negative for - chills, fever or night sweats  Respiratory ROS: no cough, shortness of breath, or wheezing  Cardiovascular ROS: no chest pain or dyspnea on exertion  Musculoskeletal ROS: negative  Neurological ROS: no TIA or stroke symptoms  Dermatological ROS: negative      EXAM:     Vitals:    20 1117   Weight: 108.4 kg (239 lb)   Height: 5' 7" (1.702 m)        General:  Alert, oriented, no acute distress      Left  Lower extremity exam:    Vascular:   Dorsalis Pedis:  diminished   Posterior Tibial:  diminished  Capillary refill time:  3 seconds  Temperature of toes warm to touch  Edema:  Trace and non-pitting       Neurological:     Sharp touch:  normal  Light touch: normal  Tinels Sign:  Absent  Mulders Click:   Absent        Dermatological:   Skin: thin and atrophic,  Slightly hyperpigmented.   Wounds/Ulcers:  Absent  Bruising:  Absent  Erythema:  Absent  Toenails normal appearing without paronychia or discoloration      Musculoskeletal:   tenderness to palpation at the proximal phalanx area of the left 3rd toe.  Mild pain with 3rd metatarso-phalangeal joint range of motion   Metatarsophalangeal range of motion:   full range of motion  Subtalar joint range of motion: full range of motion  Ankle joint range of motion:  full range of motion              Imagin20 FINDINGS:  Old healed fracture deformity involving the distal 4th left metatarsal.  Scattered multi articular degenerative changes including at the 1st MTP joint.  No acute fracture or dislocation or soft tissue swelling.          ASSESSMENT/PLAN:          Problem List Items Addressed This Visit     None      Visit Diagnoses     Pain of toe of left foot    -  Primary    Relevant Orders    X-Ray Foot Complete Left            · I counseled the patient on the patient's conditions, their implications " and medical management.    · RICE  · Continue CAM boot  · Xrays and follow up in 3-4 weeks.   · Patient is amenable to plan.

## 2020-01-13 NOTE — LETTER
January 13, 2020      Yumiko Alfonso PA-C  [address] [place] [address]           [facility]  [address] [address]  Phone: [phone]  Fax: [phone]          Patient: Maral Linn   MR Number: [id number]   YOB: 1950   Date of Visit: 1/13/2020       Dear Yumiko Alfonso:    Thank you for referring Maral Linn to me for evaluation. Attached you will find relevant portions of my assessment and plan of care.    If you have questions, please do not hesitate to call me. I look forward to following Maral Linn along with you.    Sincerely,    Dione Traore, ANTONI    Enclosure  CC:  No Recipients    If you would like to receive this communication electronically, please contact [email or web address] or [phone] to request more information on [facility] access.    For providers and/or their staff who would like to refer a patient to [facility], please contact us through our one-stop-shop provider referral line, [facility], at [phone].    If you feel you have received this communication in error or would no longer like to receive these types of communications, please e-mail [email or web address]

## 2020-01-22 ENCOUNTER — TELEPHONE (OUTPATIENT)
Dept: INTERNAL MEDICINE | Facility: CLINIC | Age: 70
End: 2020-01-22

## 2020-01-22 RX ORDER — FUROSEMIDE 40 MG/1
TABLET ORAL
Qty: 30 TABLET | Refills: 1 | Status: SHIPPED | OUTPATIENT
Start: 2020-01-22 | End: 2020-04-06 | Stop reason: SDUPTHER

## 2020-02-06 ENCOUNTER — OFFICE VISIT (OUTPATIENT)
Dept: PODIATRY | Facility: CLINIC | Age: 70
End: 2020-02-06
Payer: MEDICARE

## 2020-02-06 ENCOUNTER — APPOINTMENT (OUTPATIENT)
Dept: RADIOLOGY | Facility: OTHER | Age: 70
End: 2020-02-06
Attending: PODIATRIST
Payer: MEDICARE

## 2020-02-06 VITALS — BODY MASS INDEX: 37.51 KG/M2 | WEIGHT: 239 LBS | HEIGHT: 67 IN

## 2020-02-06 DIAGNOSIS — S92.515D CLOSED NONDISPLACED FRACTURE OF PROXIMAL PHALANX OF LESSER TOE OF LEFT FOOT WITH ROUTINE HEALING, SUBSEQUENT ENCOUNTER: Primary | ICD-10-CM

## 2020-02-06 DIAGNOSIS — M79.675 PAIN OF TOE OF LEFT FOOT: ICD-10-CM

## 2020-02-06 PROCEDURE — 99999 PR PBB SHADOW E&M-EST. PATIENT-LVL III: ICD-10-PCS | Mod: PBBFAC,,, | Performed by: PODIATRIST

## 2020-02-06 PROCEDURE — 99999 PR PBB SHADOW E&M-EST. PATIENT-LVL III: CPT | Mod: PBBFAC,,, | Performed by: PODIATRIST

## 2020-02-06 PROCEDURE — 1126F AMNT PAIN NOTED NONE PRSNT: CPT | Mod: S$GLB,,, | Performed by: PODIATRIST

## 2020-02-06 PROCEDURE — 1101F PR PT FALLS ASSESS DOC 0-1 FALLS W/OUT INJ PAST YR: ICD-10-PCS | Mod: CPTII,S$GLB,, | Performed by: PODIATRIST

## 2020-02-06 PROCEDURE — 1159F MED LIST DOCD IN RCRD: CPT | Mod: S$GLB,,, | Performed by: PODIATRIST

## 2020-02-06 PROCEDURE — 1101F PT FALLS ASSESS-DOCD LE1/YR: CPT | Mod: CPTII,S$GLB,, | Performed by: PODIATRIST

## 2020-02-06 PROCEDURE — 99213 OFFICE O/P EST LOW 20 MIN: CPT | Mod: S$GLB,,, | Performed by: PODIATRIST

## 2020-02-06 PROCEDURE — 99213 PR OFFICE/OUTPT VISIT, EST, LEVL III, 20-29 MIN: ICD-10-PCS | Mod: S$GLB,,, | Performed by: PODIATRIST

## 2020-02-06 PROCEDURE — 73630 X-RAY EXAM OF FOOT: CPT | Mod: 26,LT,, | Performed by: RADIOLOGY

## 2020-02-06 PROCEDURE — 73630 X-RAY EXAM OF FOOT: CPT | Mod: TC,LT

## 2020-02-06 PROCEDURE — 1159F PR MEDICATION LIST DOCUMENTED IN MEDICAL RECORD: ICD-10-PCS | Mod: S$GLB,,, | Performed by: PODIATRIST

## 2020-02-06 PROCEDURE — 73630 XR FOOT COMPLETE 3 VIEW LEFT: ICD-10-PCS | Mod: 26,LT,, | Performed by: RADIOLOGY

## 2020-02-06 PROCEDURE — 1126F PR PAIN SEVERITY QUANTIFIED, NO PAIN PRESENT: ICD-10-PCS | Mod: S$GLB,,, | Performed by: PODIATRIST

## 2020-02-12 ENCOUNTER — PATIENT MESSAGE (OUTPATIENT)
Dept: ADMINISTRATIVE | Facility: HOSPITAL | Age: 70
End: 2020-02-12

## 2020-02-12 ENCOUNTER — PATIENT OUTREACH (OUTPATIENT)
Dept: ADMINISTRATIVE | Facility: HOSPITAL | Age: 70
End: 2020-02-12

## 2020-02-12 DIAGNOSIS — Z12.31 ENCOUNTER FOR SCREENING MAMMOGRAM FOR BREAST CANCER: Primary | ICD-10-CM

## 2020-02-24 ENCOUNTER — PATIENT OUTREACH (OUTPATIENT)
Dept: ADMINISTRATIVE | Facility: OTHER | Age: 70
End: 2020-02-24

## 2020-03-21 ENCOUNTER — PATIENT MESSAGE (OUTPATIENT)
Dept: INTERNAL MEDICINE | Facility: CLINIC | Age: 70
End: 2020-03-21

## 2020-03-23 DIAGNOSIS — E83.39 HYPOPHOSPHATEMIA: ICD-10-CM

## 2020-03-23 RX ORDER — POTASSIUM CHLORIDE 1500 MG/1
20 TABLET, EXTENDED RELEASE ORAL DAILY
Qty: 30 TABLET | Refills: 5 | Status: SHIPPED | OUTPATIENT
Start: 2020-03-23 | End: 2020-05-26 | Stop reason: SDUPTHER

## 2020-03-24 RX ORDER — FLUCONAZOLE 150 MG/1
150 TABLET ORAL DAILY
Qty: 1 TABLET | Refills: 0 | Status: SHIPPED | OUTPATIENT
Start: 2020-03-24 | End: 2020-03-25

## 2020-04-06 DIAGNOSIS — I10 ESSENTIAL HYPERTENSION: ICD-10-CM

## 2020-04-06 RX ORDER — DOXAZOSIN 8 MG/1
8 TABLET ORAL DAILY
Qty: 90 TABLET | Refills: 0 | Status: SHIPPED | OUTPATIENT
Start: 2020-04-06 | End: 2020-08-05 | Stop reason: SDUPTHER

## 2020-04-06 RX ORDER — FUROSEMIDE 40 MG/1
40 TABLET ORAL DAILY
Qty: 30 TABLET | Refills: 0 | Status: SHIPPED | OUTPATIENT
Start: 2020-04-06 | End: 2020-05-04

## 2020-05-04 RX ORDER — FUROSEMIDE 40 MG/1
TABLET ORAL
Qty: 30 TABLET | Refills: 0 | Status: SHIPPED | OUTPATIENT
Start: 2020-05-04 | End: 2020-05-28

## 2020-05-15 ENCOUNTER — TELEPHONE (OUTPATIENT)
Dept: ENDOCRINOLOGY | Facility: CLINIC | Age: 70
End: 2020-05-15

## 2020-05-15 NOTE — TELEPHONE ENCOUNTER
----- Message from Edith Peralta sent at 5/15/2020  3:27 PM CDT -----  Contact: [facility] Estefania  Called regarding medication issues: glipiZIDE (GLUCOTROL) 5 MG tablet    Estefania stated that patient was told to stop taking this medication, however, patient refilled it twice since she was told to stop taking it    Estefania is requesting for it to be placed on hold  Estefania stated that they have the metFORMIN (GLUCOPHAGE) 1000 MG tablet waiting at the pharmacy however she is taking a 500mg dosage twice a day     Patient not adhering to her medication directions     Callback: [phone] (Estefania)

## 2020-05-15 NOTE — TELEPHONE ENCOUNTER
Pt returned my call and advised that she is not taking the Glipizide, she was unaware it was one of the meds when she picked up her other medications. She understands not to take it per Magdalena. Pt also said she had recommendation to see Franco Tee so she will schedule with her in the future.

## 2020-05-18 ENCOUNTER — TELEPHONE (OUTPATIENT)
Dept: ENDOCRINOLOGY | Facility: CLINIC | Age: 70
End: 2020-05-18

## 2020-05-25 ENCOUNTER — TELEPHONE (OUTPATIENT)
Dept: INTERNAL MEDICINE | Facility: CLINIC | Age: 70
End: 2020-05-25

## 2020-05-25 DIAGNOSIS — E11.9 CONTROLLED TYPE 2 DIABETES MELLITUS WITHOUT COMPLICATION, WITHOUT LONG-TERM CURRENT USE OF INSULIN: Primary | ICD-10-CM

## 2020-05-26 DIAGNOSIS — E83.39 HYPOPHOSPHATEMIA: ICD-10-CM

## 2020-05-26 RX ORDER — POTASSIUM CHLORIDE 1500 MG/1
20 TABLET, EXTENDED RELEASE ORAL DAILY
Qty: 90 TABLET | Refills: 0 | Status: SHIPPED | OUTPATIENT
Start: 2020-05-26 | End: 2020-09-08

## 2020-05-28 RX ORDER — FUROSEMIDE 40 MG/1
TABLET ORAL
Qty: 30 TABLET | Refills: 0 | Status: SHIPPED | OUTPATIENT
Start: 2020-05-28 | End: 2020-06-29

## 2020-06-01 DIAGNOSIS — E11.9 TYPE 2 DIABETES MELLITUS WITHOUT COMPLICATION, UNSPECIFIED WHETHER LONG TERM INSULIN USE: ICD-10-CM

## 2020-06-02 ENCOUNTER — OFFICE VISIT (OUTPATIENT)
Dept: INTERNAL MEDICINE | Facility: CLINIC | Age: 70
End: 2020-06-02
Payer: MEDICARE

## 2020-06-02 ENCOUNTER — LAB VISIT (OUTPATIENT)
Dept: LAB | Facility: HOSPITAL | Age: 70
End: 2020-06-02
Payer: MEDICARE

## 2020-06-02 VITALS
HEART RATE: 78 BPM | DIASTOLIC BLOOD PRESSURE: 90 MMHG | WEIGHT: 231 LBS | SYSTOLIC BLOOD PRESSURE: 158 MMHG | BODY MASS INDEX: 36.26 KG/M2 | HEIGHT: 67 IN | OXYGEN SATURATION: 99 %

## 2020-06-02 DIAGNOSIS — E78.5 HYPERLIPIDEMIA, UNSPECIFIED HYPERLIPIDEMIA TYPE: ICD-10-CM

## 2020-06-02 DIAGNOSIS — I10 ESSENTIAL HYPERTENSION: ICD-10-CM

## 2020-06-02 DIAGNOSIS — E66.9 OBESITY (BMI 30-39.9): ICD-10-CM

## 2020-06-02 DIAGNOSIS — E11.9 CONTROLLED TYPE 2 DIABETES MELLITUS WITHOUT COMPLICATION, WITHOUT LONG-TERM CURRENT USE OF INSULIN: ICD-10-CM

## 2020-06-02 DIAGNOSIS — Z71.89 COUNSELING AND COORDINATION OF CARE: ICD-10-CM

## 2020-06-02 DIAGNOSIS — E11.65 UNCONTROLLED TYPE 2 DIABETES MELLITUS WITH HYPERGLYCEMIA: Primary | ICD-10-CM

## 2020-06-02 PROCEDURE — 1159F PR MEDICATION LIST DOCUMENTED IN MEDICAL RECORD: ICD-10-PCS | Mod: S$GLB,,, | Performed by: NURSE PRACTITIONER

## 2020-06-02 PROCEDURE — 36415 COLL VENOUS BLD VENIPUNCTURE: CPT

## 2020-06-02 PROCEDURE — 3044F PR MOST RECENT HEMOGLOBIN A1C LEVEL <7.0%: ICD-10-PCS | Mod: CPTII,S$GLB,, | Performed by: NURSE PRACTITIONER

## 2020-06-02 PROCEDURE — 99499 RISK ADDL DX/OHS AUDIT: ICD-10-PCS | Mod: S$GLB,,, | Performed by: NURSE PRACTITIONER

## 2020-06-02 PROCEDURE — 3077F PR MOST RECENT SYSTOLIC BLOOD PRESSURE >= 140 MM HG: ICD-10-PCS | Mod: CPTII,S$GLB,, | Performed by: NURSE PRACTITIONER

## 2020-06-02 PROCEDURE — 3077F SYST BP >= 140 MM HG: CPT | Mod: CPTII,S$GLB,, | Performed by: NURSE PRACTITIONER

## 2020-06-02 PROCEDURE — 99499 UNLISTED E&M SERVICE: CPT | Mod: S$GLB,,, | Performed by: NURSE PRACTITIONER

## 2020-06-02 PROCEDURE — 1101F PT FALLS ASSESS-DOCD LE1/YR: CPT | Mod: CPTII,S$GLB,, | Performed by: NURSE PRACTITIONER

## 2020-06-02 PROCEDURE — 99999 PR PBB SHADOW E&M-EST. PATIENT-LVL III: CPT | Mod: PBBFAC,,, | Performed by: NURSE PRACTITIONER

## 2020-06-02 PROCEDURE — 99999 PR PBB SHADOW E&M-EST. PATIENT-LVL III: ICD-10-PCS | Mod: PBBFAC,,, | Performed by: NURSE PRACTITIONER

## 2020-06-02 PROCEDURE — 1159F MED LIST DOCD IN RCRD: CPT | Mod: S$GLB,,, | Performed by: NURSE PRACTITIONER

## 2020-06-02 PROCEDURE — 99214 PR OFFICE/OUTPT VISIT, EST, LEVL IV, 30-39 MIN: ICD-10-PCS | Mod: S$GLB,,, | Performed by: NURSE PRACTITIONER

## 2020-06-02 PROCEDURE — 3080F DIAST BP >= 90 MM HG: CPT | Mod: CPTII,S$GLB,, | Performed by: NURSE PRACTITIONER

## 2020-06-02 PROCEDURE — 99214 OFFICE O/P EST MOD 30 MIN: CPT | Mod: S$GLB,,, | Performed by: NURSE PRACTITIONER

## 2020-06-02 PROCEDURE — 3044F HG A1C LEVEL LT 7.0%: CPT | Mod: CPTII,S$GLB,, | Performed by: NURSE PRACTITIONER

## 2020-06-02 PROCEDURE — 3080F PR MOST RECENT DIASTOLIC BLOOD PRESSURE >= 90 MM HG: ICD-10-PCS | Mod: CPTII,S$GLB,, | Performed by: NURSE PRACTITIONER

## 2020-06-02 PROCEDURE — 83036 HEMOGLOBIN GLYCOSYLATED A1C: CPT

## 2020-06-02 PROCEDURE — 1101F PR PT FALLS ASSESS DOC 0-1 FALLS W/OUT INJ PAST YR: ICD-10-PCS | Mod: CPTII,S$GLB,, | Performed by: NURSE PRACTITIONER

## 2020-06-02 RX ORDER — METFORMIN HYDROCHLORIDE 500 MG/1
500 TABLET, EXTENDED RELEASE ORAL 2 TIMES DAILY WITH MEALS
Qty: 180 TABLET | Refills: 3 | Status: SHIPPED | OUTPATIENT
Start: 2020-06-02 | End: 2021-05-24

## 2020-06-02 RX ORDER — GLIPIZIDE 5 MG/1
5 TABLET ORAL
Qty: 90 TABLET | Refills: 2 | Status: SHIPPED | OUTPATIENT
Start: 2020-06-02 | End: 2020-12-18

## 2020-06-02 NOTE — PROGRESS NOTES
"CHIEF COMPLAINT: Type 2 Diabetes     HPI: Ms. Maral Linn is a 70 y.o. female who was diagnosed with Type 2 DM in 2010. Following with Endocrinology at [facility], Dr. Reyes but had some difficulty getting in for appointments.  PCP Dr. Pearson.   Being seen by me for the first time today -   Lives by herself.   Stays somewhat active - some gardening.   Currently controlled, a1c is 6.7% last done 11/2019 - on metformin, then had glipizide 5mg added to her regimen. Was then recommended by Magdalena IVY in endocrine to stop taking. So for now, taking just metformin 500mg po bid.   C/o lots of diarrhea - even up to 7x/day, Requests refill but asking about other options of medications.      Checks sugar "spot checks" on occasion -   Fasting in am - states "high" - 170 - 180's.   Mid afternoon/2 hours post prandial lunch - 90- 130's.   Denies hypoglycemic episodes.   Glucometer - [employer] with [employer].   Just completed a diabetic education class in January 2020.   Eyes - Dr. Valdes - denies retinopathy.   CV - denies MI or stroke. Some history of CAD in the LAD in the 1990's per patient's report - no stenting.   Takes a baby aspirin 81 daily.       Podiatry: Dr. Traore     PREVIOUS DIABETES MEDICATIONS TRIED  Metformin  Glipizide     CURRENT DIABETIC MEDS:  metformin 500 mg bid (splitting a 1000mg tablet in half right now and taking bid).  Needs de la fuente      Diabetes Management Status    Statin: Not taking  ACE/ARB: Taking    Screening or Prevention Patient's value Goal Complete/Controlled?   HgA1C Testing and Control   Lab Results   Component Value Date    HGBA1C 6.7 (H) 11/15/2019      Annually/Less than 8% Yes   Lipid profile : 02/22/2019 Annually No   LDL control Lab Results   Component Value Date    LDLCALC 99.2 02/22/2019    Annually/Less than 100 mg/dl  No   Nephropathy screening Lab Results   Component Value Date    LABMICR 14.0 02/22/2019     Lab Results   Component Value Date    PROTEINUA Negative " "10/02/2012    Annually No   Blood pressure BP Readings from Last 1 Encounters:   01/06/20 130/80    Less than 140/90 Yes   Dilated retinal exam : 02/13/2019 Annually Yes   Foot exam   : 07/24/2019 Annually Yes     REVIEW OF SYSTEMS  General: no weakness, fatigue, + weight changes.   Eyes: no double or blurred vision, eye pain, or redness  Cardiovascular: no chest pain, palpitations, edema, or murmurs.   Respiratory: no cough or dyspnea.   GI: no heartburn, nausea, + bowel patterns-diarrhea 5-7x/day; good appetite.   Skin: no rashes, dryness, itching, or reactions at insulin injection sites.  Neuro: no numbness, tingling, tremors, or vertigo.   Endocrine: no polyuria, polydipsia, polyphagia, heat or cold intolerance.     Vital Signs  BP (!) 158/90   Pulse 78   Ht 5' 7" (1.702 m)   Wt 104.8 kg (231 lb)   SpO2 99%   BMI 36.18 kg/m²     Hemoglobin A1C   Date Value Ref Range Status   11/15/2019 6.7 (H) 4.0 - 5.6 % Final     Comment:     ADA Screening Guidelines:  5.7-6.4%  Consistent with prediabetes  >or=6.5%  Consistent with diabetes  High levels of fetal hemoglobin interfere with the HbA1C  assay. Heterozygous hemoglobin variants (HbS, HgC, etc)do  not significantly interfere with this assay.   However, presence of multiple variants may affect accuracy.     02/22/2019 8.0 (H) 4.0 - 5.6 % Final     Comment:     ADA Screening Guidelines:  5.7-6.4%  Consistent with prediabetes  >or=6.5%  Consistent with diabetes  High levels of fetal hemoglobin interfere with the HbA1C  assay. Heterozygous hemoglobin variants (HbS, HgC, etc)do  not significantly interfere with this assay.   However, presence of multiple variants may affect accuracy.     04/27/2018 6.6 (H) 4.0 - 5.6 % Final     Comment:     According to ADA guidelines, hemoglobin A1c <7.0% represents  optimal control in non-pregnant diabetic patients. Different  metrics may apply to specific patient populations.   Standards of Medical Care in Diabetes-2016.  For the " purpose of screening for the presence of diabetes:  <5.7%     Consistent with the absence of diabetes  5.7-6.4%  Consistent with increasing risk for diabetes   (prediabetes)  >or=6.5%  Consistent with diabetes  Currently, no consensus exists for use of hemoglobin A1c  for diagnosis of diabetes for children.  This Hemoglobin A1c assay has significant interference with fetal   hemoglobin   (HbF). The results are invalid for patients with abnormal amounts of   HbF,   including those with known Hereditary Persistence   of Fetal Hemoglobin. Heterozygous hemoglobin variants (HbAS, HbAC,   HbAD, HbAE, HbA2) do not significantly interfere with this assay;   however, presence of multiple variants in a sample may impact the %   interference.          Chemistry        Component Value Date/Time     11/20/2019 1457    K 4.0 11/20/2019 1457     11/20/2019 1457    CO2 27 11/20/2019 1457    BUN 19 11/20/2019 1457    CREATININE 1.1 11/20/2019 1457    GLU 96 11/20/2019 1457        Component Value Date/Time    CALCIUM 11.3 (H) 11/20/2019 1457    ALKPHOS 89 11/20/2019 1457    AST 14 11/20/2019 1457    ALT 15 11/20/2019 1457    BILITOT 0.3 11/20/2019 1457    ESTGFRAFRICA 59 (A) 11/20/2019 1457    EGFRNONAA 51 (A) 11/20/2019 1457           Lab Results   Component Value Date    TSH 2.463 02/22/2019      Lab Results   Component Value Date    CHOL 172 02/22/2019    CHOL 186 06/23/2017    CHOL 174 06/22/2016     Lab Results   Component Value Date    HDL 37 (L) 02/22/2019    HDL 37 (L) 06/23/2017    HDL 40 06/22/2016     Lab Results   Component Value Date    LDLCALC 99.2 02/22/2019    LDLCALC 119.8 06/23/2017    LDLCALC 109.2 06/22/2016     Lab Results   Component Value Date    TRIG 179 (H) 02/22/2019    TRIG 146 06/23/2017    TRIG 124 06/22/2016     Lab Results   Component Value Date    CHOLHDL 21.5 02/22/2019    CHOLHDL 19.9 (L) 06/23/2017    CHOLHDL 23.0 06/22/2016         PHYSICAL EXAMINATION  Constitutional: Appears well, no  distress Reviewed vitals above.  Eyes: conjunctivae & lids intact; PERRLA, EOMs intact   Neck: Supple, trachea midline.   Respiratory: No wheezes, even and unlabored  Cardiovascular:  no edema or varicosities  Lymph: deferred   Skin: warm and dry; no injection site reactions, no acanthosis nigracans observed.  Neuro:patient alert and cooperative, normal affect; steady gait.  Psychiatric: judgement & insight intact, orientation of time, place & person intact, memory; mood & affect wnl     Diabetes Foot Exam:   Deferred       Assessment/Plan  1. Uncontrolled type 2 diabetes mellitus with hyperglycemia  Hemoglobin A1C    Comprehensive metabolic panel    TSH    Lipid Panel    Hemoglobin A1C   2. Essential hypertension     3. Obesity (BMI 30-39.9)     4. Unspecified essential hypertension     5. Counseling and coordination of care       1. Type 2 diabetes - stable a1c - continue metformin - will switch from metformin 500 mg tablet bid to  mg bid instead to help with s/e's. Can add back in glipizide 5mg tablet daily. Can consider Trulicity in future if needed or still intolerance to metformin if needed. F/u in 6 mos  Carb counting book given per novonordisk   a1c lipid tsh cmp   a1c next time  a1c goal 7% based on her age.     2. Controlled, continue med    3. Body mass index is 36.18 kg/m². may increase insulin resistance    4.Lipids -   Lab Results   Component Value Date    LDLCALC 99.2 02/22/2019       5. See above     FOLLOW UP  Follow up in about 6 months (around 12/2/2020).

## 2020-06-02 NOTE — PATIENT INSTRUCTIONS
Snacks can be an important part of a balanced, healthy meal plan. They allow you to eat more frequently, feeling full and satisfied throughout the day. Also, they allow you to spread carbohydrates evenly, which may stabilize blood sugars.  Plus, snacks are enjoyable!     The amount of carbohydrate needed at snacks varies. Generally, about 15-30 grams of carbohydrate per snack is recommended.  Below you will find some tasty treats.       0-5 gm carb   Crystal Light   Vitamin Water Zero   Herbal tea, unsweetened   2 tsp peanut butter on celery   1./2 cup sugar-free jell-o   1 sugar-free popsicle   ¼ cup blueberries   8oz Blue Kia unsweetened almond milk   5 baby carrots & celery sticks, cucumbers, bell peppers dipped in ¼ cup salsa, 2Tbsp light ranch dressing or 2Tbsp plain Greek yogurt   10 Goldfish crackers   ½ oz low-fat cheese or string cheese   1 closed handful of nuts, unsalted   1 Tbsp of sunflower seeds, unsalted   1 cup Smart Pop popcorn   1 whole grain brown rice cake        15 gm carb   1 small piece of fruit or ½ banana or 1/2 cup lite canned fruit   3 debbie cracker squares   3 cups Smart Pop popcorn, top spray butter, Marcial lite salt or cinnamon and Truvia   5 Vanilla Wafers   ½ cup low fat, no added sugar ice cream or frozen yogurt (Blue bell, Blue Bunny, Weight Watchers, Skinny Cow)   ½ turkey, ham, or chicken sandwich   ½ c fruit with ½ c Cottage cheese   4-6 unsalted wheat crackers with 1 oz low fat cheese or 1 tbsp peanut butter    30-45 goldfish crackers (depending on flavor)    7-8 Rastafarian mini brown rice cakes (caramel, apple cinnamon, chocolate)    12 Rastafarian mini brown rice cakes (cheddar, bbq, ranch)    1/3 cup hummus dip with raw veg   1/2 whole wheat kostas, 1Tbsp hummus   Mini Pizza (1/2 whole wheat English muffin, low-fat  cheese, tomato sauce)   100 calorie snack pack (Oreo, Chips Ahoy, Ritz Mix, Baked Cheetos)   4-6 oz. light or Greek Style yogurt  (Zulma, Divya, [facility])   ½ cup sugar-free pudding     6 in. wheat tortilla or kostas oven toasted chips (topped with spray butter flavoring, cinnamon, Truvia OR spray butter, garlic powder, chili powder)    18 BBQ Popchips (available at [employer], Whole Foods, Fresh Market)

## 2020-06-03 LAB
ESTIMATED AVG GLUCOSE: 137 MG/DL (ref 68–131)
HBA1C MFR BLD HPLC: 6.4 % (ref 4–5.6)

## 2020-06-10 ENCOUNTER — TELEPHONE (OUTPATIENT)
Dept: INTERNAL MEDICINE | Facility: CLINIC | Age: 70
End: 2020-06-10

## 2020-06-10 RX ORDER — CANDESARTAN 16 MG/1
16 TABLET ORAL DAILY
Qty: 90 TABLET | Refills: 0 | Status: SHIPPED | OUTPATIENT
Start: 2020-06-10 | End: 2020-08-05 | Stop reason: SDUPTHER

## 2020-06-10 NOTE — TELEPHONE ENCOUNTER
Approved Medications      candesartan (ATACAND) 16 MG tablet         Sig: Take 1 tablet (16 mg total) by mouth once daily.    Disp:  90 tablet    Refills:  0    Start: 6/10/2020 - 6/10/2021    Class: Normal    Authorized by: Rosemary Pearson MD        To be filled at: [facility]/pharmacy #[id number] - [place], [place] - [address] AT [facility], [place]

## 2020-07-20 ENCOUNTER — LAB VISIT (OUTPATIENT)
Dept: LAB | Facility: HOSPITAL | Age: 70
End: 2020-07-20
Attending: INTERNAL MEDICINE
Payer: MEDICARE

## 2020-07-20 DIAGNOSIS — I10 ESSENTIAL HYPERTENSION: ICD-10-CM

## 2020-07-20 LAB
ALBUMIN SERPL BCP-MCNC: 3.5 G/DL (ref 3.5–5.2)
ALP SERPL-CCNC: 88 U/L (ref 55–135)
ALT SERPL W/O P-5'-P-CCNC: 18 U/L (ref 10–44)
ANION GAP SERPL CALC-SCNC: 9 MMOL/L (ref 8–16)
AST SERPL-CCNC: 17 U/L (ref 10–40)
BILIRUB SERPL-MCNC: 0.5 MG/DL (ref 0.1–1)
BUN SERPL-MCNC: 17 MG/DL (ref 8–23)
CALCIUM SERPL-MCNC: 11.4 MG/DL (ref 8.7–10.5)
CHLORIDE SERPL-SCNC: 106 MMOL/L (ref 95–110)
CO2 SERPL-SCNC: 26 MMOL/L (ref 23–29)
CREAT SERPL-MCNC: 1 MG/DL (ref 0.5–1.4)
EST. GFR  (AFRICAN AMERICAN): >60 ML/MIN/1.73 M^2
EST. GFR  (NON AFRICAN AMERICAN): 56.8 ML/MIN/1.73 M^2
GLUCOSE SERPL-MCNC: 106 MG/DL (ref 70–110)
POTASSIUM SERPL-SCNC: 3.9 MMOL/L (ref 3.5–5.1)
PROT SERPL-MCNC: 7.2 G/DL (ref 6–8.4)
SODIUM SERPL-SCNC: 141 MMOL/L (ref 136–145)

## 2020-07-20 PROCEDURE — 36415 COLL VENOUS BLD VENIPUNCTURE: CPT

## 2020-07-20 PROCEDURE — 80053 COMPREHEN METABOLIC PANEL: CPT

## 2020-08-01 ENCOUNTER — TELEPHONE (OUTPATIENT)
Dept: INTERNAL MEDICINE | Facility: CLINIC | Age: 70
End: 2020-08-01

## 2020-08-05 ENCOUNTER — TELEPHONE (OUTPATIENT)
Dept: INTERNAL MEDICINE | Facility: CLINIC | Age: 70
End: 2020-08-05

## 2020-08-05 ENCOUNTER — OFFICE VISIT (OUTPATIENT)
Dept: INTERNAL MEDICINE | Facility: CLINIC | Age: 70
End: 2020-08-05
Payer: MEDICARE

## 2020-08-05 DIAGNOSIS — M79.606 PAIN OF LOWER EXTREMITY, UNSPECIFIED LATERALITY: Primary | ICD-10-CM

## 2020-08-05 DIAGNOSIS — E11.9 DIABETES MELLITUS WITHOUT COMPLICATION: ICD-10-CM

## 2020-08-05 DIAGNOSIS — Z12.31 SCREENING MAMMOGRAM, ENCOUNTER FOR: ICD-10-CM

## 2020-08-05 DIAGNOSIS — I10 ESSENTIAL HYPERTENSION: ICD-10-CM

## 2020-08-05 DIAGNOSIS — E21.3 HYPERPARATHYROIDISM: ICD-10-CM

## 2020-08-05 PROCEDURE — 3044F HG A1C LEVEL LT 7.0%: CPT | Mod: CPTII,S$GLB,, | Performed by: INTERNAL MEDICINE

## 2020-08-05 PROCEDURE — 99214 OFFICE O/P EST MOD 30 MIN: CPT | Mod: S$GLB,,, | Performed by: INTERNAL MEDICINE

## 2020-08-05 PROCEDURE — 1159F PR MEDICATION LIST DOCUMENTED IN MEDICAL RECORD: ICD-10-PCS | Mod: S$GLB,,, | Performed by: INTERNAL MEDICINE

## 2020-08-05 PROCEDURE — 1126F AMNT PAIN NOTED NONE PRSNT: CPT | Mod: S$GLB,,, | Performed by: INTERNAL MEDICINE

## 2020-08-05 PROCEDURE — 99499 RISK ADDL DX/OHS AUDIT: ICD-10-PCS | Mod: S$GLB,,, | Performed by: INTERNAL MEDICINE

## 2020-08-05 PROCEDURE — 1126F PR PAIN SEVERITY QUANTIFIED, NO PAIN PRESENT: ICD-10-PCS | Mod: S$GLB,,, | Performed by: INTERNAL MEDICINE

## 2020-08-05 PROCEDURE — 1159F MED LIST DOCD IN RCRD: CPT | Mod: S$GLB,,, | Performed by: INTERNAL MEDICINE

## 2020-08-05 PROCEDURE — 99499 UNLISTED E&M SERVICE: CPT | Mod: S$GLB,,, | Performed by: INTERNAL MEDICINE

## 2020-08-05 PROCEDURE — 3008F PR BODY MASS INDEX (BMI) DOCUMENTED: ICD-10-PCS | Mod: CPTII,S$GLB,, | Performed by: INTERNAL MEDICINE

## 2020-08-05 PROCEDURE — 3008F BODY MASS INDEX DOCD: CPT | Mod: CPTII,S$GLB,, | Performed by: INTERNAL MEDICINE

## 2020-08-05 PROCEDURE — 1101F PT FALLS ASSESS-DOCD LE1/YR: CPT | Mod: CPTII,S$GLB,, | Performed by: INTERNAL MEDICINE

## 2020-08-05 PROCEDURE — 3079F PR MOST RECENT DIASTOLIC BLOOD PRESSURE 80-89 MM HG: ICD-10-PCS | Mod: CPTII,S$GLB,, | Performed by: INTERNAL MEDICINE

## 2020-08-05 PROCEDURE — 1101F PR PT FALLS ASSESS DOC 0-1 FALLS W/OUT INJ PAST YR: ICD-10-PCS | Mod: CPTII,S$GLB,, | Performed by: INTERNAL MEDICINE

## 2020-08-05 PROCEDURE — 3044F PR MOST RECENT HEMOGLOBIN A1C LEVEL <7.0%: ICD-10-PCS | Mod: CPTII,S$GLB,, | Performed by: INTERNAL MEDICINE

## 2020-08-05 PROCEDURE — 99999 PR PBB SHADOW E&M-EST. PATIENT-LVL V: ICD-10-PCS | Mod: PBBFAC,,, | Performed by: INTERNAL MEDICINE

## 2020-08-05 PROCEDURE — 3079F DIAST BP 80-89 MM HG: CPT | Mod: CPTII,S$GLB,, | Performed by: INTERNAL MEDICINE

## 2020-08-05 PROCEDURE — 99999 PR PBB SHADOW E&M-EST. PATIENT-LVL V: CPT | Mod: PBBFAC,,, | Performed by: INTERNAL MEDICINE

## 2020-08-05 PROCEDURE — 3077F PR MOST RECENT SYSTOLIC BLOOD PRESSURE >= 140 MM HG: ICD-10-PCS | Mod: CPTII,S$GLB,, | Performed by: INTERNAL MEDICINE

## 2020-08-05 PROCEDURE — 3077F SYST BP >= 140 MM HG: CPT | Mod: CPTII,S$GLB,, | Performed by: INTERNAL MEDICINE

## 2020-08-05 PROCEDURE — 99214 PR OFFICE/OUTPT VISIT, EST, LEVL IV, 30-39 MIN: ICD-10-PCS | Mod: S$GLB,,, | Performed by: INTERNAL MEDICINE

## 2020-08-05 RX ORDER — AMLODIPINE BESYLATE 5 MG/1
5 TABLET ORAL DAILY
Qty: 90 TABLET | Refills: 1 | Status: SHIPPED | OUTPATIENT
Start: 2020-08-05 | End: 2021-01-04 | Stop reason: ALTCHOICE

## 2020-08-05 RX ORDER — DOXAZOSIN 8 MG/1
8 TABLET ORAL DAILY
Qty: 90 TABLET | Refills: 1 | Status: SHIPPED | OUTPATIENT
Start: 2020-08-05 | End: 2020-08-05 | Stop reason: ALTCHOICE

## 2020-08-05 RX ORDER — METOPROLOL SUCCINATE 50 MG/1
TABLET, EXTENDED RELEASE ORAL
Qty: 90 TABLET | Refills: 1 | Status: SHIPPED | OUTPATIENT
Start: 2020-08-05 | End: 2021-02-02

## 2020-08-05 RX ORDER — CANDESARTAN 16 MG/1
16 TABLET ORAL DAILY
Qty: 90 TABLET | Refills: 1 | Status: SHIPPED | OUTPATIENT
Start: 2020-08-05 | End: 2021-02-02

## 2020-08-05 RX ORDER — FUROSEMIDE 40 MG/1
40 TABLET ORAL DAILY
Qty: 90 TABLET | Refills: 1 | Status: SHIPPED | OUTPATIENT
Start: 2020-08-05 | End: 2021-03-01

## 2020-08-09 VITALS
HEART RATE: 71 BPM | SYSTOLIC BLOOD PRESSURE: 158 MMHG | TEMPERATURE: 99 F | WEIGHT: 233.44 LBS | OXYGEN SATURATION: 98 % | BODY MASS INDEX: 36.64 KG/M2 | HEIGHT: 67 IN | DIASTOLIC BLOOD PRESSURE: 80 MMHG

## 2020-08-10 NOTE — PROGRESS NOTES
Patient, Maral Linn (MRN #[id number]), presented with a recorded BMI of 36.57 kg/m^2 and a documented comorbidity(s):  - Hypertension  to which the severe obesity is a contributing factor. This is consistent with the definition of severe obesity (BMI 35.0-39.9) with comorbidity (ICD-10 E66.01, Z68.35). The patient's severe obesity was monitored, evaluated, addressed and/or treated. This addendum to the medical record is made on 08/10/2020.

## 2020-08-10 NOTE — PROGRESS NOTES
Subjective:       Patient ID: Maral Linn is a 70 y.o. female.    Chief Complaint: Hypertension    HPI  She returns for management of hypertension.  She has had hypertension for over a year.  Current treatment has included medications outlined in medication list.  She denies chest pain or shortness of breath.  No palpitations.  Denies left arm or neck pain.  She has diabetes.  Denies polyuria, polydipsia    PAST MEDICAL HISTORY: Hypertension, carpal tunnel syndrome, diabetes, thyroid nodule,   hypothyroidism, hyperparathyroidism, status post hysterectomy, bladder repair,   cholecystectomy, metatarsal fracture, and liver hemangioma.     She had a colonoscopy July 2012     MEDICATIONS:  Cardura 8 mg at bedtime ,  metformin 500 mg twice a day, Toprol XL 50 mg daily, Atacand 16 mg daily, Lasix 40 mg daily, Glucotrol 5 mg daily    ALLERGIES: No known drug allergies.      Review of Systems   Constitutional: Negative for chills, fatigue, fever and unexpected weight change.   Respiratory: Negative for chest tightness and shortness of breath.    Cardiovascular: Negative for chest pain and palpitations.   Gastrointestinal: Negative for abdominal pain and blood in stool.   Neurological: Negative for dizziness, syncope, numbness and headaches.       Objective:      Physical Exam  HENT:      Right Ear: External ear normal.      Left Ear: External ear normal.      Nose: Nose normal.      Mouth/Throat:      Mouth: Mucous membranes are moist.      Pharynx: Oropharynx is clear.   Eyes:      Pupils: Pupils are equal, round, and reactive to light.   Neck:      Musculoskeletal: Normal range of motion.   Cardiovascular:      Rate and Rhythm: Normal rate and regular rhythm.      Heart sounds: No murmur.   Pulmonary:      Breath sounds: Normal breath sounds.   Abdominal:      General: There is no distension.      Palpations: There is no hepatomegaly or splenomegaly.      Tenderness: There is no abdominal tenderness.   Lymphadenopathy:       Cervical: No cervical adenopathy.      Upper Body:      Right upper body: No axillary adenopathy.      Left upper body: No axillary adenopathy.   Neurological:      Cranial Nerves: No cranial nerve deficit.      Sensory: No sensory deficit.      Motor: Motor function is intact.      Deep Tendon Reflexes: Reflexes are normal and symmetric.       breast exam:  No masses palpated, no nipple discharge expressed  Assessment/Plan       Assessment and plan:  1.  Hypertension:  Check CMP, lipid panel, CBC, TSH.  Discontinue Cardura.  Start Norvasc 5 mg daily.  Return to clinic in 1 month blood pressure check  2.  Diabetes:  Check urine microalbumin  3.  Schedule mammogram and YOBANI

## 2020-08-12 ENCOUNTER — HOSPITAL ENCOUNTER (OUTPATIENT)
Dept: RADIOLOGY | Facility: HOSPITAL | Age: 70
Discharge: HOME | End: 2020-08-12
Attending: INTERNAL MEDICINE
Payer: MEDICARE

## 2020-08-12 DIAGNOSIS — Z12.31 SCREENING MAMMOGRAM, ENCOUNTER FOR: ICD-10-CM

## 2020-08-12 PROCEDURE — 77067 MAMMO DIGITAL SCREENING BILAT WITH TOMOSYNTHESIS_CAD: ICD-10-PCS | Mod: 26,,, | Performed by: RADIOLOGY

## 2020-08-12 PROCEDURE — 77067 SCR MAMMO BI INCL CAD: CPT | Mod: 26,,, | Performed by: RADIOLOGY

## 2020-08-12 PROCEDURE — 77063 BREAST TOMOSYNTHESIS BI: CPT | Mod: 26,,, | Performed by: RADIOLOGY

## 2020-08-12 PROCEDURE — 77063 MAMMO DIGITAL SCREENING BILAT WITH TOMOSYNTHESIS_CAD: ICD-10-PCS | Mod: 26,,, | Performed by: RADIOLOGY

## 2020-08-12 PROCEDURE — 77067 SCR MAMMO BI INCL CAD: CPT | Mod: TC

## 2020-08-15 ENCOUNTER — TELEPHONE (OUTPATIENT)
Dept: INTERNAL MEDICINE | Facility: CLINIC | Age: 70
End: 2020-08-15

## 2020-08-20 ENCOUNTER — HOSPITAL ENCOUNTER (OUTPATIENT)
Dept: VASCULAR SURGERY | Facility: CLINIC | Age: 70
Discharge: HOME | End: 2020-08-20
Attending: INTERNAL MEDICINE
Payer: MEDICARE

## 2020-08-20 DIAGNOSIS — M79.606 PAIN OF LOWER EXTREMITY, UNSPECIFIED LATERALITY: ICD-10-CM

## 2020-08-20 PROCEDURE — 93923 PR NON-INVASIVE PHYSIOLOGIC STUDY EXTREMITY 3 LEVELS: ICD-10-PCS | Mod: S$GLB,,, | Performed by: SURGERY

## 2020-08-20 PROCEDURE — 93923 UPR/LXTR ART STDY 3+ LVLS: CPT | Mod: S$GLB,,, | Performed by: SURGERY

## 2020-09-06 DIAGNOSIS — E83.39 HYPOPHOSPHATEMIA: ICD-10-CM

## 2020-09-08 RX ORDER — POTASSIUM CHLORIDE 1500 MG/1
TABLET, EXTENDED RELEASE ORAL
Qty: 90 TABLET | Refills: 0 | Status: SHIPPED | OUTPATIENT
Start: 2020-09-08 | End: 2021-02-09 | Stop reason: SDUPTHER

## 2020-09-21 ENCOUNTER — TELEPHONE (OUTPATIENT)
Dept: INTERNAL MEDICINE | Facility: CLINIC | Age: 70
End: 2020-09-21

## 2020-09-21 DIAGNOSIS — E11.65 UNCONTROLLED TYPE 2 DIABETES MELLITUS WITH HYPERGLYCEMIA: Primary | ICD-10-CM

## 2020-09-21 DIAGNOSIS — I10 ESSENTIAL HYPERTENSION: ICD-10-CM

## 2020-09-21 DIAGNOSIS — Z71.89 COUNSELING AND COORDINATION OF CARE: ICD-10-CM

## 2020-09-21 DIAGNOSIS — E66.9 OBESITY (BMI 30-39.9): ICD-10-CM

## 2020-09-21 NOTE — TELEPHONE ENCOUNTER
----- Message from Kaylynn Pierce sent at 9/19/2020  8:18 AM CDT -----  Contact: [facility]  Patient Message: Pt wants a check up        Appointment date requested if any: 10/20/2020       Additional information: Request lab work before appt , contact [phone]

## 2020-09-29 ENCOUNTER — PATIENT OUTREACH (OUTPATIENT)
Dept: ADMINISTRATIVE | Facility: OTHER | Age: 70
End: 2020-09-29

## 2020-09-29 ENCOUNTER — LAB VISIT (OUTPATIENT)
Dept: LAB | Facility: HOSPITAL | Age: 70
End: 2020-09-29
Payer: MEDICARE

## 2020-09-29 DIAGNOSIS — E11.65 UNCONTROLLED TYPE 2 DIABETES MELLITUS WITH HYPERGLYCEMIA: ICD-10-CM

## 2020-09-29 LAB
ESTIMATED AVG GLUCOSE: 140 MG/DL (ref 68–131)
HBA1C MFR BLD HPLC: 6.5 % (ref 4–5.6)

## 2020-09-29 PROCEDURE — 36415 COLL VENOUS BLD VENIPUNCTURE: CPT

## 2020-09-29 PROCEDURE — 83036 HEMOGLOBIN GLYCOSYLATED A1C: CPT

## 2020-09-29 NOTE — PROGRESS NOTES
Chart reviewed.   Immunizations: updated  Orders placed: n/a  Upcoming appts to satisfy LOUIE topics: Hemoglobin A1c 9/29, Optometry 10/01

## 2020-10-01 ENCOUNTER — OFFICE VISIT (OUTPATIENT)
Dept: OPTOMETRY | Facility: CLINIC | Age: 70
End: 2020-10-01
Payer: MEDICARE

## 2020-10-01 ENCOUNTER — OFFICE VISIT (OUTPATIENT)
Dept: INTERNAL MEDICINE | Facility: CLINIC | Age: 70
End: 2020-10-01
Payer: MEDICARE

## 2020-10-01 ENCOUNTER — IMMUNIZATION (OUTPATIENT)
Dept: INTERNAL MEDICINE | Facility: CLINIC | Age: 70
End: 2020-10-01
Payer: MEDICARE

## 2020-10-01 VITALS
DIASTOLIC BLOOD PRESSURE: 94 MMHG | WEIGHT: 231.69 LBS | HEIGHT: 67 IN | SYSTOLIC BLOOD PRESSURE: 170 MMHG | OXYGEN SATURATION: 98 % | HEART RATE: 58 BPM | BODY MASS INDEX: 36.36 KG/M2

## 2020-10-01 DIAGNOSIS — H26.9 CORTICAL CATARACT OF BOTH EYES: ICD-10-CM

## 2020-10-01 DIAGNOSIS — E11.9 TYPE 2 DIABETES MELLITUS WITHOUT RETINOPATHY: ICD-10-CM

## 2020-10-01 DIAGNOSIS — E66.9 OBESITY (BMI 30-39.9): ICD-10-CM

## 2020-10-01 DIAGNOSIS — E78.5 HYPERLIPIDEMIA, UNSPECIFIED HYPERLIPIDEMIA TYPE: ICD-10-CM

## 2020-10-01 DIAGNOSIS — H52.203 MYOPIA WITH ASTIGMATISM AND PRESBYOPIA, BILATERAL: ICD-10-CM

## 2020-10-01 DIAGNOSIS — Z71.89 COUNSELING AND COORDINATION OF CARE: ICD-10-CM

## 2020-10-01 DIAGNOSIS — H52.4 MYOPIA WITH ASTIGMATISM AND PRESBYOPIA, BILATERAL: ICD-10-CM

## 2020-10-01 DIAGNOSIS — E11.9 DIABETIC EYE EXAM: Primary | ICD-10-CM

## 2020-10-01 DIAGNOSIS — B49 FUNGAL INFECTION: ICD-10-CM

## 2020-10-01 DIAGNOSIS — H25.13 NUCLEAR SCLEROSIS OF BOTH EYES: ICD-10-CM

## 2020-10-01 DIAGNOSIS — H52.13 MYOPIA WITH ASTIGMATISM AND PRESBYOPIA, BILATERAL: ICD-10-CM

## 2020-10-01 DIAGNOSIS — Z01.00 DIABETIC EYE EXAM: Primary | ICD-10-CM

## 2020-10-01 DIAGNOSIS — I10 ESSENTIAL HYPERTENSION: ICD-10-CM

## 2020-10-01 DIAGNOSIS — E11.9 DIABETES MELLITUS WITHOUT COMPLICATION: ICD-10-CM

## 2020-10-01 DIAGNOSIS — E11.65 UNCONTROLLED TYPE 2 DIABETES MELLITUS WITH HYPERGLYCEMIA: Primary | ICD-10-CM

## 2020-10-01 PROCEDURE — 99999 PR PBB SHADOW E&M-EST. PATIENT-LVL IV: ICD-10-PCS | Mod: PBBFAC,,, | Performed by: OPTOMETRIST

## 2020-10-01 PROCEDURE — 3044F PR MOST RECENT HEMOGLOBIN A1C LEVEL <7.0%: ICD-10-PCS | Mod: CPTII,S$GLB,, | Performed by: NURSE PRACTITIONER

## 2020-10-01 PROCEDURE — 99214 PR OFFICE/OUTPT VISIT, EST, LEVL IV, 30-39 MIN: ICD-10-PCS | Mod: S$GLB,,, | Performed by: NURSE PRACTITIONER

## 2020-10-01 PROCEDURE — 99999 PR PBB SHADOW E&M-EST. PATIENT-LVL I: ICD-10-PCS | Mod: PBBFAC,,,

## 2020-10-01 PROCEDURE — 92014 COMPRE OPH EXAM EST PT 1/>: CPT | Mod: S$GLB,,, | Performed by: OPTOMETRIST

## 2020-10-01 PROCEDURE — 99214 OFFICE O/P EST MOD 30 MIN: CPT | Mod: S$GLB,,, | Performed by: NURSE PRACTITIONER

## 2020-10-01 PROCEDURE — 3008F PR BODY MASS INDEX (BMI) DOCUMENTED: ICD-10-PCS | Mod: CPTII,S$GLB,, | Performed by: NURSE PRACTITIONER

## 2020-10-01 PROCEDURE — 90694 FLU VACCINE - QUADRIVALENT - ADJUVANTED: ICD-10-PCS | Mod: S$GLB,,, | Performed by: NURSE PRACTITIONER

## 2020-10-01 PROCEDURE — 3077F SYST BP >= 140 MM HG: CPT | Mod: CPTII,S$GLB,, | Performed by: NURSE PRACTITIONER

## 2020-10-01 PROCEDURE — 1159F MED LIST DOCD IN RCRD: CPT | Mod: S$GLB,,, | Performed by: NURSE PRACTITIONER

## 2020-10-01 PROCEDURE — 1125F AMNT PAIN NOTED PAIN PRSNT: CPT | Mod: S$GLB,,, | Performed by: NURSE PRACTITIONER

## 2020-10-01 PROCEDURE — 1125F PR PAIN SEVERITY QUANTIFIED, PAIN PRESENT: ICD-10-PCS | Mod: S$GLB,,, | Performed by: NURSE PRACTITIONER

## 2020-10-01 PROCEDURE — 90694 VACC AIIV4 NO PRSRV 0.5ML IM: CPT | Mod: S$GLB,,, | Performed by: NURSE PRACTITIONER

## 2020-10-01 PROCEDURE — 3080F PR MOST RECENT DIASTOLIC BLOOD PRESSURE >= 90 MM HG: ICD-10-PCS | Mod: CPTII,S$GLB,, | Performed by: NURSE PRACTITIONER

## 2020-10-01 PROCEDURE — 1101F PR PT FALLS ASSESS DOC 0-1 FALLS W/OUT INJ PAST YR: ICD-10-PCS | Mod: CPTII,S$GLB,, | Performed by: NURSE PRACTITIONER

## 2020-10-01 PROCEDURE — 92014 PR EYE EXAM, EST PATIENT,COMPREHESV: ICD-10-PCS | Mod: S$GLB,,, | Performed by: OPTOMETRIST

## 2020-10-01 PROCEDURE — 3077F PR MOST RECENT SYSTOLIC BLOOD PRESSURE >= 140 MM HG: ICD-10-PCS | Mod: CPTII,S$GLB,, | Performed by: NURSE PRACTITIONER

## 2020-10-01 PROCEDURE — 1101F PT FALLS ASSESS-DOCD LE1/YR: CPT | Mod: CPTII,S$GLB,, | Performed by: NURSE PRACTITIONER

## 2020-10-01 PROCEDURE — 1159F PR MEDICATION LIST DOCUMENTED IN MEDICAL RECORD: ICD-10-PCS | Mod: S$GLB,,, | Performed by: NURSE PRACTITIONER

## 2020-10-01 PROCEDURE — 99999 PR PBB SHADOW E&M-EST. PATIENT-LVL I: CPT | Mod: PBBFAC,,,

## 2020-10-01 PROCEDURE — 3008F BODY MASS INDEX DOCD: CPT | Mod: CPTII,S$GLB,, | Performed by: NURSE PRACTITIONER

## 2020-10-01 PROCEDURE — 99999 PR PBB SHADOW E&M-EST. PATIENT-LVL V: CPT | Mod: PBBFAC,,, | Performed by: NURSE PRACTITIONER

## 2020-10-01 PROCEDURE — G0008 ADMIN INFLUENZA VIRUS VAC: HCPCS | Mod: S$GLB,,, | Performed by: NURSE PRACTITIONER

## 2020-10-01 PROCEDURE — 99999 PR PBB SHADOW E&M-EST. PATIENT-LVL IV: CPT | Mod: PBBFAC,,, | Performed by: OPTOMETRIST

## 2020-10-01 PROCEDURE — 92015 DETERMINE REFRACTIVE STATE: CPT | Mod: S$GLB,,, | Performed by: OPTOMETRIST

## 2020-10-01 PROCEDURE — 3080F DIAST BP >= 90 MM HG: CPT | Mod: CPTII,S$GLB,, | Performed by: NURSE PRACTITIONER

## 2020-10-01 PROCEDURE — 92015 PR REFRACTION: ICD-10-PCS | Mod: S$GLB,,, | Performed by: OPTOMETRIST

## 2020-10-01 PROCEDURE — G0008 FLU VACCINE - QUADRIVALENT - ADJUVANTED: ICD-10-PCS | Mod: S$GLB,,, | Performed by: NURSE PRACTITIONER

## 2020-10-01 PROCEDURE — 99999 PR PBB SHADOW E&M-EST. PATIENT-LVL V: ICD-10-PCS | Mod: PBBFAC,,, | Performed by: NURSE PRACTITIONER

## 2020-10-01 PROCEDURE — 3044F HG A1C LEVEL LT 7.0%: CPT | Mod: CPTII,S$GLB,, | Performed by: NURSE PRACTITIONER

## 2020-10-01 RX ORDER — CICLOPIROX 80 MG/ML
SOLUTION TOPICAL NIGHTLY
Qty: 6.6 ML | Refills: 1 | Status: SHIPPED | OUTPATIENT
Start: 2020-10-01

## 2020-10-01 NOTE — PATIENT INSTRUCTIONS
Snacks can be an important part of a balanced, healthy meal plan. They allow you to eat more frequently, feeling full and satisfied throughout the day. Also, they allow you to spread carbohydrates evenly, which may stabilize blood sugars.  Plus, snacks are enjoyable!     The amount of carbohydrate needed at snacks varies. Generally, about 15-30 grams of carbohydrate per snack is recommended.  Below you will find some tasty treats.       0-5 gm carb   Crystal Light   Vitamin Water Zero   Herbal tea, unsweetened   2 tsp peanut butter on celery   1./2 cup sugar-free jell-o   1 sugar-free popsicle   ¼ cup blueberries   8oz Blue Kia unsweetened almond milk   5 baby carrots & celery sticks, cucumbers, bell peppers dipped in ¼ cup salsa, 2Tbsp light ranch dressing or 2Tbsp plain Greek yogurt   10 Goldfish crackers   ½ oz low-fat cheese or string cheese   1 closed handful of nuts, unsalted   1 Tbsp of sunflower seeds, unsalted   1 cup Smart Pop popcorn   1 whole grain brown rice cake        15 gm carb   1 small piece of fruit or ½ banana or 1/2 cup lite canned fruit   3 debbie cracker squares   3 cups Smart Pop popcorn, top spray butter, Marcial lite salt or cinnamon and Truvia   5 Vanilla Wafers   ½ cup low fat, no added sugar ice cream or frozen yogurt (Blue bell, Blue Bunny, Weight Watchers, Skinny Cow)   ½ turkey, ham, or chicken sandwich   ½ c fruit with ½ c Cottage cheese   4-6 unsalted wheat crackers with 1 oz low fat cheese or 1 tbsp peanut butter    30-45 goldfish crackers (depending on flavor)    7-8 Worship mini brown rice cakes (caramel, apple cinnamon, chocolate)    12 Worship mini brown rice cakes (cheddar, bbq, ranch)    1/3 cup hummus dip with raw veg   1/2 whole wheat kostas, 1Tbsp hummus   Mini Pizza (1/2 whole wheat English muffin, low-fat  cheese, tomato sauce)   100 calorie snack pack (Oreo, Chips Ahoy, Ritz Mix, Baked Cheetos)   4-6 oz. light or Greek Style yogurt  (Zulma, Divya, Kavita, [facility])   ½ cup sugar-free pudding     6 in. wheat tortilla or kostas oven toasted chips (topped with spray butter flavoring, cinnamon, Truvia OR spray butter, garlic powder, chili powder)    18 BBQ Popchips (available at Target, Whole Foods, Fresh Market)                   Ciclopirox nail solution  What is this medicine?  CICLOPIROX (sye kloe PEER ox) NAIL SOLUTION is an antifungal medicine. It used to treat fungal infections of the nails.  How should I use this medicine?  This medicine is for external use only. Follow the directions that come with this medicine exactly. Wash and dry your hands before use. Avoid contact with the eyes, mouth or nose. If you do get this medicine in your eyes, rinse out with plenty of cool tap water. Contact your doctor or health care professional if eye irritation occurs. Use at regular intervals. Do not use your medicine more often than directed. Finish the full course prescribed by your doctor or health care professional even if you think you are better. Do not stop using except on your doctor's advice.  Talk to your pediatrician regarding the use of this medicine in children. While this medicine may be prescribed for children as young as 12 years for selected conditions, precautions do apply.  What side effects may I notice from receiving this medicine?  Side effects that you should report to your doctor or health care professional as soon as possible:  · allergic reactions like skin rash, itching or hives, swelling of the face, lips, or tongue  · severe irritation, redness, burning, blistering, peeling, swelling, oozing  Side effects that usually do not require medical attention (report to your doctor or health care professional if they continue or are bothersome):  · mild reddening of the skin  · nail discoloration  · temporary burning or mild stinging at the site of application  What may interact with this medicine?  Interactions are not  expected. Do not use any other skin products without telling your doctor or health care professional.  What if I miss a dose?  If you miss a dose, use it as soon as you can. If it is almost time for your next dose, use only that dose. Do not use double or extra doses.  Where should I keep my medicine?  Keep out of the reach of children.  Store at room temperature between 15 and 30 degrees C (59 and 86 degrees F). Do not freeze. Protect from light by storing the bottle in the carton after every use. This medicine is flammable. Keep away from heat and flame. Throw away any unused medicine after the expiration date.  What should I tell my health care provider before I take this medicine?  They need to know if you have any of these conditions:  · diabetes mellitus  · history of seizures  · HIV infection  · immune system problems or organ transplant  · large areas of burned or damaged skin  · peripheral vascular disease or poor circulation  · taking corticosteroid medication (including steroid inhalers, cream, or lotion)  · an unusual or allergic reaction to ciclopirox, isopropyl alcohol, other medicines, foods, dyes, or preservatives  · pregnant or trying to get pregnant  · breast-feeding  What should I watch for while using this medicine?  Tell your doctor or health care professional if your symptoms get worse. Four to six months of treatment may be needed for the nail(s) to improve. Some people may not achieve a complete cure or clearing of the nails by this time.  Tell your doctor or health care professional if you develop sores or blisters that do not heal properly. If your nail infection returns after stopping using this product, contact your doctor or health care professional.  NOTE:This sheet is a summary. It may not cover all possible information. If you have questions about this medicine, talk to your doctor, pharmacist, or health care provider. Copyright© 2017 [employer]        Managing Diabetes: The A1C  Test       Healthy red blood cells have some glucose stuck to them. A high A1C means that unhealthy amounts of glucose are stuck to the cells.   What is the A1C test?  Using your meter helps you track your blood sugar every day. But your glucose meter tells you the value at the time of testing only. You also need to know if your treatment plan is keeping you healthy over time. The hemoglobin A1C (or glycated hemoglobin) test can help. This test measures your average blood sugar level over a few months. A higher A1C result means that you have a higher risk of developing complications.  The A1C test  The A1C is a blood test done by your healthcare provider. You will likely have an A1C test every 3 to 6 months.  Your blood glucose goal  A1C has been shown as a percentage. But it can also be shown as a number representing the estimated Average Glucose (eAG). Unlike the A1C percentage, eAG is a number similar to the numbers listed on your daily glucose monitor. Both A1C and eAG measure the amount of glucose stuck to a protein called hemoglobin in red blood cells. Your healthcare provider will help you figure out what your ideal A1C or eAG should be. Your target number will depend on your age, general health, and other factors. If your current number is too high, your treatment plan may need changes, such as different medicines.  Sample results  Most people aim for an A1c lower than 7%. Thats an eAG less than 154 mg/dL. Or, your healthcare provider may want you to aim for an A1C of 6%. Thats an eAG of 126 mg/dL.     Glucose calculator  Visit http://[email or web address].diabetes.org/diapro/glucose_calc for a chart that helps convert your A1C percentages into eAG numbers.   Date Last Reviewed: 6/1/2016 © 2000-2017 [employer]. [address] [place] [address], [place] [address]. All rights reserved. This information is not intended as a substitute for professional medical care. Always follow your healthcare professional's  instructions.        Hypoglycemia (Low Blood Sugar)     Fast-acting sugar includes a cup of nonfat milk.     Too little sugar (glucose) in your blood is called hypoglycemia or low blood sugar. Low blood sugar usually means anything lower than 70 mg/dL. Talk with your healthcare provider about your target range and what level is too low for you. Diabetes itself doesnt cause low blood sugar. But some of the treatments for diabetes, such as pills or insulin, may raise your risk for it. Low blood sugar may cause you to pass out or have a seizure. So always treat low blood sugar right away, but don't overeat.  Special note: Always carry a source of fast-acting sugar and a snack in case of hypoglycemia.   What you may notice  If you have low blood sugar, you may have one or more of these symptoms:  · Shakiness or dizziness  · Cold, clammy skin or sweating  · Feelings of hunger  · Headache  · Nervousness  · A hard, fast heartbeat  · Weakness  · Confusion or irritability  · Blurred vision  · Having nightmares or waking up confused or sweating  · Numbness or tingling in the lips or tongue  What you should do  Here are tips to follow if you have hypoglycemia:   · First check your blood sugar. If it is too low (out of your target range), eat or drink 15 to 20 grams of fast-acting sugar. This may be 3 to 4 glucose tablets, 4 ounces (half a cup) of fruit juice or regular (nondiet) soda, 8 ounces (1 cup) of fat-free milk, or 1 tablespoon of honey. Dont take more than this, or your blood sugar may go too high.  · Wait 15 minutes. Then recheck your blood sugar if you can.  · If your blood sugar is still too low, repeat the steps above and check your blood sugar again. If your blood sugar still has not returned to your target range, contact your healthcare provider or seek emergency care.  · Once your blood sugar returns to target range, eat a snack or meal.  Preventing low blood sugar  Things you can do include the  following:   · If your condition needs a strict treatment plan, eat your meals and snacks at the same times each day. Dont skip meals!  · If your treatment plan lets you change when you eat and what you eat, learn how to change the time and dose of your rapid-acting insulin to match this.   · Ask your healthcare provider if it is safe for you to drink alcohol. Never drink on an empty stomach.  · Take your medicine at the prescribed times.  · Always carry a source of fast-acting sugar and a snack when youre away from home.  Other things to do  Additional tips include the following:  · Carry a medical ID card, a compact USB drive, or wear a medical alert bracelet or necklace. It should say that you have diabetes. It should also say what to do if you pass out or have a seizure.  · Make sure your family, friends, and coworkers know the signs of low blood sugar. Tell them what to do if your blood sugar falls very low and you cant treat yourself.  · Keep a glucagon emergency kit handy. Be sure your family, friends, and coworkers know how and when to use it. Check it regularly and replace the glucagon before it expires.  · Talk with your health care team about other things you can do to prevent low blood sugar.     If you have unexplained hypoglycemia or hypoglycemia several times, call your healthcare provider.   Date Last Reviewed: 5/1/2016  © 6899-5595 [employer]. [address] [place] [address], [place] [address]. All rights reserved. This information is not intended as a substitute for professional medical care. Always follow your healthcare professional's instructions.

## 2020-10-01 NOTE — LETTER
October 4, 2020      Rosemary Pearson MD  [address]  [facility] [address]           [facility]  [address]  [facility] [address]  Phone: [phone]  Fax: [phone]          Patient: Maral Linn   MR Number: [id number]   YOB: 1950   Date of Visit: 10/1/2020       Dear Dr. Rosemary Pearson:    Thank you for referring Maral Linn to me for evaluation. Attached you will find relevant portions of my assessment and plan of care.    If you have questions, please do not hesitate to call me. I look forward to following Maral Linn along with you.    Sincerely,    Kolton Valdes, OD    Enclosure  CC:  No Recipients    If you would like to receive this communication electronically, please contact [email or web address] or [phone] to request more information on [employer] access.    For providers and/or their staff who would like to refer a patient to [facility], please contact us through our one-stop-shop provider referral line, [facility], at [phone].    If you feel you have received this communication in error or would no longer like to receive these types of communications, please e-mail [email or web address]

## 2020-10-01 NOTE — PROGRESS NOTES
"CHIEF COMPLAINT: Type 2 Diabetes     HPI: Ms. Maral Linn is a 70 y.o. female who was diagnosed with Type 2 DM in 2010. Following with Endocrinology at [facility], Dr. Reyes but had some difficulty getting in for appointments. Seen by Magdalena IVY in endo in past. PCP Dr. Pearson.   Last seen by me in 6/2020.  Lives by herself.     Stays somewhat active - some gardening.     C/o lots of diarrhea - even up to 7x/day, Requests refill but asking about other options of medications.      Checks sugar "spot checks" on occasion -   Highest: 146   Lowest: 110   Denies hypoglycemic episodes.     Glucometer - [place] with [employer].      Just completed a diabetic education class in January 2020.   Eyes - Dr. Valdes - denies retinopathy.   CV - denies MI or stroke. Some history of CAD in the LAD in the 1990's per patient's report - no stenting.   Takes a baby aspirin 81 daily.    Podiatry: Dr. Traore       Since then,   Lab Results   Component Value Date    HGBA1C 6.5 (H) 09/29/2020       PREVIOUS DIABETES MEDICATIONS TRIED  Metformin  Glipizide     CURRENT DIABETIC MEDS:  metformin 500 mg bid, glipizide 5 mg w/ breakfast    Diabetes Management Status    Statin: Not taking  ACE/ARB: Taking    Screening or Prevention Patient's value Goal Complete/Controlled?   HgA1C Testing and Control   Lab Results   Component Value Date    HGBA1C 6.5 (H) 09/29/2020      Annually/Less than 8% Yes   Lipid profile : 08/05/2020 Annually No   LDL control Lab Results   Component Value Date    LDLCALC 107.2 08/05/2020    Annually/Less than 100 mg/dl  No   Nephropathy screening Lab Results   Component Value Date    LABMICR 9.0 08/05/2020     Lab Results   Component Value Date    PROTEINUA Negative 10/02/2012    Annually No   Blood pressure BP Readings from Last 1 Encounters:   08/05/20 (!) 158/80    Less than 140/90 Yes   Dilated retinal exam : 10/01/2020 Annually Yes   Foot exam   : 06/02/2020 Annually Yes     REVIEW OF SYSTEMS  General: " "no weakness, fatigue, + weight changes 7-8# loss   Eyes: no double or blurred vision, eye pain, or redness  Cardiovascular: no chest pain, palpitations, edema, or murmurs.   Respiratory: no cough or dyspnea.   GI: no heartburn, nausea, + bowel patterns-diarrhea here and there; good appetite.   Skin: no rashes, dryness, itching, or reactions at insulin injection sites.  Neuro: no numbness, tingling, tremors, or vertigo. +great toe pain (R)-fungal  Endocrine: no polyuria, polydipsia, polyphagia, heat or cold intolerance.     Vital Signs  BP (!) 170/94 (BP Location: Left arm, Patient Position: Sitting, BP Method: Large (Manual))   Pulse (!) 58   Ht 5' 7" (1.702 m)   Wt 105.1 kg (231 lb 11.3 oz)   SpO2 98%   BMI 36.29 kg/m²     Hemoglobin A1C   Date Value Ref Range Status   09/29/2020 6.5 (H) 4.0 - 5.6 % Final     Comment:     ADA Screening Guidelines:  5.7-6.4%  Consistent with prediabetes  >or=6.5%  Consistent with diabetes  High levels of fetal hemoglobin interfere with the HbA1C  assay. Heterozygous hemoglobin variants (HbS, HgC, etc)do  not significantly interfere with this assay.   However, presence of multiple variants may affect accuracy.     06/02/2020 6.4 (H) 4.0 - 5.6 % Final     Comment:     ADA Screening Guidelines:  5.7-6.4%  Consistent with prediabetes  >or=6.5%  Consistent with diabetes  High levels of fetal hemoglobin interfere with the HbA1C  assay. Heterozygous hemoglobin variants (HbS, HgC, etc)do  not significantly interfere with this assay.   However, presence of multiple variants may affect accuracy.     11/15/2019 6.7 (H) 4.0 - 5.6 % Final     Comment:     ADA Screening Guidelines:  5.7-6.4%  Consistent with prediabetes  >or=6.5%  Consistent with diabetes  High levels of fetal hemoglobin interfere with the HbA1C  assay. Heterozygous hemoglobin variants (HbS, HgC, etc)do  not significantly interfere with this assay.   However, presence of multiple variants may affect accuracy.          " Chemistry        Component Value Date/Time     08/05/2020 1155    K 3.2 (L) 08/05/2020 1155     08/05/2020 1155    CO2 29 08/05/2020 1155    BUN 19 08/05/2020 1155    CREATININE 1.1 08/05/2020 1155    GLU 93 08/05/2020 1155        Component Value Date/Time    CALCIUM 11.2 (H) 08/05/2020 1155    ALKPHOS 96 08/05/2020 1155    AST 21 08/05/2020 1155    ALT 25 08/05/2020 1155    BILITOT 0.5 08/05/2020 1155    ESTGFRAFRICA 58.4 (A) 08/05/2020 1155    EGFRNONAA 50.6 (A) 08/05/2020 1155           Lab Results   Component Value Date    TSH 1.919 08/05/2020      Lab Results   Component Value Date    CHOL 173 08/05/2020    CHOL 172 02/22/2019    CHOL 186 06/23/2017     Lab Results   Component Value Date    HDL 35 (L) 08/05/2020    HDL 37 (L) 02/22/2019    HDL 37 (L) 06/23/2017     Lab Results   Component Value Date    LDLCALC 107.2 08/05/2020    LDLCALC 99.2 02/22/2019    LDLCALC 119.8 06/23/2017     Lab Results   Component Value Date    TRIG 154 (H) 08/05/2020    TRIG 179 (H) 02/22/2019    TRIG 146 06/23/2017     Lab Results   Component Value Date    CHOLHDL 20.2 08/05/2020    CHOLHDL 21.5 02/22/2019    CHOLHDL 19.9 (L) 06/23/2017         PHYSICAL EXAMINATION  Constitutional: Appears well, no distress Reviewed vitals above.  Eyes: conjunctivae & lids intact; PERRLA, EOMs intact   Neck: Supple, trachea midline.   Respiratory: No wheezes, even and unlabored  Cardiovascular:  no edema or varicosities  Lymph: deferred   Skin: warm and dry; no injection site reactions, no acanthosis nigracans observed.  Neuro:patient alert and cooperative, normal affect; steady gait.  Psychiatric: judgement & insight intact, orientation of time, place & person intact, memory; mood & affect wnl     Diabetes Foot Exam:   Deferred       Assessment/Plan    1. Uncontrolled type 2 diabetes mellitus with hyperglycemia  Hemoglobin A1C    Ambulatory referral/consult to Podiatry   2. Hyperlipidemia, unspecified hyperlipidemia type     3. Obesity  (BMI 30-39.9)     4. Essential hypertension     5. Counseling and coordination of care     6. Fungal infection       1. F/u in 5-6 mos w/ me  a1c prior  Continue meds  a1c at goal  Lab Results   Component Value Date    HGBA1C 6.5 (H) 09/29/2020     2.   Lab Results   Component Value Date    LDLCALC 107.2 08/05/2020   near goal  3. Body mass index is 36.29 kg/m². may increase insulin resistance  4. See above  5. [place] rx sent  Podiatry prn     FOLLOW UP  Follow up in about 6 months (around 4/1/2021).

## 2020-10-01 NOTE — PROGRESS NOTES
HPI     Diabetic Eye Exam      Additional comments: Diabetic eye exam and refraction.  NIDDM x 10 years +/-.  Takes Metformin  Pt states no noticeable changes with VA.  Wears glasses full-time               Comments       Patient's age: 70 y.o. AA female  Occupation: Retired  Approximate date of last eye examination:  02/19/2018  Name of last eye doctor seen: Dr. Valdes   Wears glasses? yes     If yes, wears  Full time  Present glasses are bifocal / S V Distance / S V Reading:  Bifocals lens  Approximate age of present glasses:  Unsure   Got new glasses following last exam, or subsequently?:  No    Any problem with VA with glasses?  No problems   Wears CLs?:  No   Headaches? no  Eye pain/discomfort?  Dryness - satisfactory relief of symptoms with OTC   artificial tears                                                                                  Flashes?  no  Floaters?  Occasionally   Diplopia/Double vision?  no  Patient's Ocular History:         Any eye surgeries? no         Any eye injury?  no         Any treatment for eye disease?  H/o iritis - no recurrence since   last examination   Family history of eye disease?  Late dad-glaucoma  Significant patient medical history:         1. Diabetes?  Yes, LBS was on yesterday 115-120       If yes, IDDM or NIDDM?    2. HBP?  Yes,               3. Other (describe):  High cholesterol, arthritis   ! OTC eyedrops currently using:  Visine prn   ! Prescription eye meds currently using:  no   ! Any history of allergy/adverse reaction to any eye meds used   previously?  no    ! Any history of allergy/adverse reaction to eyedrops used during prior   eye exam(s)? no    ! Any history of allergy/adverse reaction to Novacaine or similar meds?   no   ! Any history of allergy/adverse reaction to Epinephrine or similar meds?   no    ! Patient okay with use of anesthetic eyedrops to check eye pressure?    yes        ! Patient okay with use of eyedrops to dilate pupils today?  Yes     "                                                 !  Allergies/Medications/Medical History/Family History reviewed today?    yes        PD =   68/64  Desired reading distance =  16"                                                                          Last edited by Kolton Valdes, OD on 10/1/2020 12:18 PM. (History)            Assessment /Plan     For exam results, see Encounter Report.    1. Diabetic eye exam     2. Diabetes mellitus without complication  Ambulatory referral/consult to Optometry   3. Type 2 diabetes mellitus without retinopathy     4. Nuclear sclerosis of both eyes     5. Cortical cataract of both eyes     6. Myopia with astigmatism and presbyopia, bilateral                    Early nuclear sclerosis of lens of both eyes, consistent with age.  Early peripheral cortical cataract in both eyes.  Still no need for cataract surgery in either eye.  Otherwise, good ocular health in both eyes.    Type 2 diabetes without evidence of diabetic retinopathy in either eye.    Myopia with astigmatism in each eye.    Only minor change in refractive error in the rigth eey.   Stable refractive error in the left eye.  Good best-corrected VA in each eye.  Presbyopia.  New spectacle lens Rx issued for use as desired.  Recommend full-time wear.  Ms. Linn no longer wearing CLs.     Recheck in one year - or prior if any problems noted in the interim.             "

## 2020-10-01 NOTE — PATIENT INSTRUCTIONS
Early nuclear sclerosis of lens of both eyes, consistent with age.  Early peripheral cortical cataract in both eyes.  Still no need for cataract surgery in either eye.  Otherwise, good ocular health in both eyes.    Type 2 diabetes without evidence of diabetic retinopathy in either eye.    Myopia with astigmatism in each eye.    Only minor change in refractive error in the rigth eey.   Stable refractive error in the left eye.  Good best-corrected VA in each eye.  Presbyopia.  New spectacle lens Rx issued for use as desired.  Recommend full-time wear.  Ms. Linn no longer wearing CLs.     Recheck in one year - or prior if any problems noted in the interim.

## 2020-10-20 ENCOUNTER — TELEPHONE (OUTPATIENT)
Dept: INTERNAL MEDICINE | Facility: CLINIC | Age: 70
End: 2020-10-20

## 2020-10-20 ENCOUNTER — OFFICE VISIT (OUTPATIENT)
Dept: PODIATRY | Facility: CLINIC | Age: 70
End: 2020-10-20
Payer: MEDICARE

## 2020-10-20 VITALS
SYSTOLIC BLOOD PRESSURE: 174 MMHG | HEIGHT: 67 IN | BODY MASS INDEX: 36.36 KG/M2 | HEART RATE: 54 BPM | WEIGHT: 231.69 LBS | DIASTOLIC BLOOD PRESSURE: 81 MMHG

## 2020-10-20 DIAGNOSIS — B35.1 DERMATOPHYTOSIS OF NAIL: Primary | ICD-10-CM

## 2020-10-20 DIAGNOSIS — E11.65 UNCONTROLLED TYPE 2 DIABETES MELLITUS WITH HYPERGLYCEMIA: ICD-10-CM

## 2020-10-20 DIAGNOSIS — E11.9 ENCOUNTER FOR DIABETIC FOOT EXAM: ICD-10-CM

## 2020-10-20 PROCEDURE — 3008F BODY MASS INDEX DOCD: CPT | Mod: CPTII,S$GLB,, | Performed by: PODIATRIST

## 2020-10-20 PROCEDURE — 11721 ROUTINE FOOT CARE: ICD-10-PCS | Mod: Q9,S$GLB,, | Performed by: PODIATRIST

## 2020-10-20 PROCEDURE — 1125F AMNT PAIN NOTED PAIN PRSNT: CPT | Mod: S$GLB,,, | Performed by: PODIATRIST

## 2020-10-20 PROCEDURE — 99212 PR OFFICE/OUTPT VISIT, EST, LEVL II, 10-19 MIN: ICD-10-PCS | Mod: 25,S$GLB,, | Performed by: PODIATRIST

## 2020-10-20 PROCEDURE — 3079F PR MOST RECENT DIASTOLIC BLOOD PRESSURE 80-89 MM HG: ICD-10-PCS | Mod: CPTII,S$GLB,, | Performed by: PODIATRIST

## 2020-10-20 PROCEDURE — 1159F PR MEDICATION LIST DOCUMENTED IN MEDICAL RECORD: ICD-10-PCS | Mod: S$GLB,,, | Performed by: PODIATRIST

## 2020-10-20 PROCEDURE — 1159F MED LIST DOCD IN RCRD: CPT | Mod: S$GLB,,, | Performed by: PODIATRIST

## 2020-10-20 PROCEDURE — 11721 DEBRIDE NAIL 6 OR MORE: CPT | Mod: Q9,S$GLB,, | Performed by: PODIATRIST

## 2020-10-20 PROCEDURE — 3079F DIAST BP 80-89 MM HG: CPT | Mod: CPTII,S$GLB,, | Performed by: PODIATRIST

## 2020-10-20 PROCEDURE — 3044F HG A1C LEVEL LT 7.0%: CPT | Mod: CPTII,S$GLB,, | Performed by: PODIATRIST

## 2020-10-20 PROCEDURE — 99999 PR PBB SHADOW E&M-EST. PATIENT-LVL IV: CPT | Mod: PBBFAC,,, | Performed by: PODIATRIST

## 2020-10-20 PROCEDURE — 1125F PR PAIN SEVERITY QUANTIFIED, PAIN PRESENT: ICD-10-PCS | Mod: S$GLB,,, | Performed by: PODIATRIST

## 2020-10-20 PROCEDURE — 3077F SYST BP >= 140 MM HG: CPT | Mod: CPTII,S$GLB,, | Performed by: PODIATRIST

## 2020-10-20 PROCEDURE — 3008F PR BODY MASS INDEX (BMI) DOCUMENTED: ICD-10-PCS | Mod: CPTII,S$GLB,, | Performed by: PODIATRIST

## 2020-10-20 PROCEDURE — 1101F PR PT FALLS ASSESS DOC 0-1 FALLS W/OUT INJ PAST YR: ICD-10-PCS | Mod: CPTII,S$GLB,, | Performed by: PODIATRIST

## 2020-10-20 PROCEDURE — 1101F PT FALLS ASSESS-DOCD LE1/YR: CPT | Mod: CPTII,S$GLB,, | Performed by: PODIATRIST

## 2020-10-20 PROCEDURE — 99999 PR PBB SHADOW E&M-EST. PATIENT-LVL IV: ICD-10-PCS | Mod: PBBFAC,,, | Performed by: PODIATRIST

## 2020-10-20 PROCEDURE — 99499 RISK ADDL DX/OHS AUDIT: ICD-10-PCS | Mod: S$GLB,,, | Performed by: PODIATRIST

## 2020-10-20 PROCEDURE — 3077F PR MOST RECENT SYSTOLIC BLOOD PRESSURE >= 140 MM HG: ICD-10-PCS | Mod: CPTII,S$GLB,, | Performed by: PODIATRIST

## 2020-10-20 PROCEDURE — 99212 OFFICE O/P EST SF 10 MIN: CPT | Mod: 25,S$GLB,, | Performed by: PODIATRIST

## 2020-10-20 PROCEDURE — 3044F PR MOST RECENT HEMOGLOBIN A1C LEVEL <7.0%: ICD-10-PCS | Mod: CPTII,S$GLB,, | Performed by: PODIATRIST

## 2020-10-20 PROCEDURE — 99499 UNLISTED E&M SERVICE: CPT | Mod: S$GLB,,, | Performed by: PODIATRIST

## 2020-10-20 NOTE — TELEPHONE ENCOUNTER
Left message for patient to call back and reschedule appointment for today Dr. Pearson is out of the office.

## 2020-10-20 NOTE — PATIENT INSTRUCTIONS
How to Check Your Feet    Below are tips to help you look for foot problems. Try to check your feet at the same time each day, such as when you get out of bed in the morning.    · Check the top of each foot. The tops of toes, back of the heel, and outer edge of the foot can get a lot of rubbing from poor-fitting shoes.    · Check the bottom of each foot. Daily wear and tear often leads to problems at pressure spots.    · Check the toes and nails. Fungal infections often occur between toes. Toenail problems can also be a sign of fungal infections or lead to breaks in the skin.    · Check your shoes, too. Loose objects inside a shoe can injure the foot. Use your hand to feel inside your shoes for things like katlin, loose stitching, or rough areas that could irritate your skin.        Diabetic Foot Care    Diabetes can lead to a number of different foot complications. Fortunately, most of these complications can be prevented with a little extra foot care. If diabetes is not well controlled, the high blood sugar can cause damage to blood vessels and result in poor circulation to the foot. When the skin does not get enough blood flow, it becomes prone to pressure sores and ulcers, which heal slowly.  High blood sugar can also damage nerves, interfering with the ability to feel pain and pressure. When you cant feel your foot normally, it is easy to injure your skin, bones and joints without knowing it. For these reasons diabetes increases the risk of fungal infections, bunions and ulcers. Deep ulcers can lead to bone infection. Gangrene is the most serious foot complication of diabetes. It usually occurs on the tips of the toes as blacked areas of skin. The black area is dead tissue. In severe cases, gangrene spreads to involve the entire toe, other toes and the entire foot. Foot or toe amputation may be required. Good foot care and blood sugar control can prevent this.    Home Care  1. Wear comfortable, proper fitting  shoes.  2. Wash your feet daily with warm water and mild soap.  3. After drying, apply a moisturizing cream or lotion.  4. Check your feet daily for skin breaks, blisters, swelling, or redness. Look between your toes also.  5. Wear cotton socks and change them every day.  6. Trim toe nails carefully and do not cut your cuticles.  7. Strive to keep your blood sugar under control with a combination of medicines, diet and activity.  8. If you smoke and have diabetes, it is very important that you stop. Smoking reduces blood flow to your foot.  9. Avoid activities that increase your risk of foot injury:  · Do not walk barefoot.  · Do not use heating pads or hot water bottles on your feet.  · Do not put your foot in a hot tub without first checking the temperature with your hand.  10) Schedule yearly foot exams.    Follow Up  with your doctor or as advised by our staff. Report any cut, puncture, scrape, other injury, blister, ingrown toenail or ulcer on your foot.    Get Prompt Medical Attention  if any of the following occur:  -- Open ulcer with pus draining from the wound  -- Increasing foot or leg pain  -- New areas of redness or swelling or tender areas of the foot    © 3099-[address]35 [employer]. [address] [place] [address], [place] [address]. All rights reserved. This information is not intended as a substitute for professional medical care. Always follow your healthcare professional's instructions.      General nail care measures for abnormal nails include:    ? Keeping nails trimmed short    ? Avoiding trauma     ? Avoiding contact irritants     ? Keeping nails dry (avoiding wet work)    ? Avoiding all nail cosmetics

## 2020-10-20 NOTE — TELEPHONE ENCOUNTER
----- Message from Frida Lewis sent at 10/20/2020 11:00 AM CDT -----  Contact: Maral ramirez [phone]  Type:  Patient Returning Call    Who Called: Maral ramirez  Who Left Message for Patient: Kiera  Does the patient know what this is regarding?:  Would the patient rather a call back or a response via [employer]? Call back  Best Call Back Number: [phone]  Additional Information: Pt was returning the nurses call

## 2020-10-20 NOTE — TELEPHONE ENCOUNTER
Spoke with patient and she agreed to new date and time 12/3/2020 at 11 am for follow up.     Please schedule.

## 2020-10-20 NOTE — LETTER
October 20, 2020      TERESO Echevarria, FNP  [address]  [place] [address]           [facility] - Podiatry  [address] [address]  Phone: [phone]  Fax: [phone]          Patient: Maral Linn   MR Number: [id number]   YOB: 1950   Date of Visit: 10/20/2020       Dear Yonatan Tee:    Thank you for referring Maral Linn to me for evaluation. Attached you will find relevant portions of my assessment and plan of care.    If you have questions, please do not hesitate to call me. I look forward to following Maral Linn along with you.    Sincerely,    Dione Traore DPM    Enclosure  CC:  No Recipients    If you would like to receive this communication electronically, please contact [email or web address] or [phone] to request more information on [facility] access.    For providers and/or their staff who would like to refer a patient to [facility], please contact us through our one-stop-shop provider referral line, [facility], at [phone].    If you feel you have received this communication in error or would no longer like to receive these types of communications, please e-mail [email or web address]

## 2020-10-20 NOTE — PROGRESS NOTES
Chief Complaint   Patient presents with    Diabetes Mellitus     TERESO Echevarria, FNP 10/1/2020 A1c 6.5 9/29/20           HPI:   Maral Linn is a 70 y.o. female here for evaluation and management of diabetic feet.  She has history of left 3rd toe fracture.  The area is healed and non tender.  She does have pain in her right great toe due to thickness.  She recalls no acute trauma to this area.  She has been using Penlac on her affected toenails.  She is wearing flat slip-on shoes.  She states they are comfortable otherwise.            Patient Active Problem List   Diagnosis    Unspecified essential hypertension    Hip pain, left    At risk for falling    Lumbar spinal stenosis    Lumbosacral neuritis    LBP (low back pain)    Hyperparathyroidism    Uncontrolled type 2 diabetes mellitus with hyperglycemia    Obesity (BMI 30-39.9)    Hyperlipidemia    Hypertension    Vitamin D deficiency    Thyroid nodule    Hypophosphatemia    Chronic neck pain    Counseling and coordination of care    Pain of lower extremity    Fungal infection           Current Outpatient Medications on File Prior to Visit   Medication Sig Dispense Refill    amLODIPine (NORVASC) 5 MG tablet Take 1 tablet (5 mg total) by mouth once daily. 90 tablet 1    blood sugar diagnostic Strp 1 each by Misc.(Non-Drug; Combo Route) route once daily. 100 strip 3    blood-glucose meter kit Use as instructed 1 each 0    candesartan (ATACAND) 16 MG tablet Take 1 tablet (16 mg total) by mouth once daily. 90 tablet 1    cholecalciferol, vitamin D3, 1,000 unit capsule Take 1 capsule (1,000 Units total) by mouth once daily.  0    ciclopirox (PENLAC) 8 % Soln Apply topically nightly. 6.6 mL 1    fluticasone (FLONASE) 50 mcg/actuation nasal spray 2 Spray, Suspension Nasal Every day      furosemide (LASIX) 40 MG tablet Take 1 tablet (40 mg total) by mouth once daily. 90 tablet 1    glipiZIDE (GLUCOTROL) 5 MG tablet Take 1 tablet (5 mg  total) by mouth daily with breakfast. 90 tablet 2    lancets 33 gauge Misc 1 lancet by Misc.(Non-Drug; Combo Route) route once daily. 100 each 3    metFORMIN (GLUCOPHAGE-XR) 500 MG XR 24hr tablet Take 1 tablet (500 mg total) by mouth 2 (two) times daily with meals. 180 tablet 3    metoprolol succinate (TOPROL-XL) 50 MG 24 hr tablet TAKE 1 TABLET(50 MG) BY MOUTH EVERY DAY 90 tablet 1    mupirocin (BACTROBAN) 2 % ointment Apply to affected area 3 times daily 22 g 0    PNEUMOVAX 23 25 mcg/0.5 mL       potassium chloride (K-TAB) 20 mEq TAKE 1 TABLET BY MOUTH ONCE DAILY 90 tablet 0    potassium phosphate, monobasic, (K-PHOS) 500 mg TbSO Take 1 tablet (500 mg total) by mouth once daily. 30 tablet 2     No current facility-administered medications on file prior to visit.            Review of patient's allergies indicates:  No Known Allergies        Social History     Socioeconomic History    Marital status:      Spouse name: Not on file    Number of children: Not on file    Years of education: Not on file    Highest education level: Not on file   Occupational History    Not on file   Social Needs    Financial resource strain: Not on file    Food insecurity     Worry: Not on file     Inability: Not on file    Transportation needs     Medical: Not on file     Non-medical: Not on file   Tobacco Use    Smoking status: Former Smoker     Quit date: 1983     Years since quittin.8    Smokeless tobacco: Never Used   Substance and Sexual Activity    Alcohol use: Yes     Comment: rarely    Drug use: No    Sexual activity: Not Currently     Birth control/protection: None   Lifestyle    Physical activity     Days per week: Not on file     Minutes per session: Not on file    Stress: Not on file   Relationships    Social connections     Talks on phone: Not on file     Gets together: Not on file     Attends Jewish service: Not on file     Active member of club or organization: Not on file      "Attends meetings of clubs or organizations: Not on file     Relationship status: Not on file   Other Topics Concern    Are you pregnant or think you may be? Not Asked    Breast-feeding Not Asked   Social History Narrative    Not on file             ROS:   General ROS: negative for - chills, fever or night sweats  Respiratory ROS: no cough, shortness of breath, or wheezing  Cardiovascular ROS: no chest pain or dyspnea on exertion  Musculoskeletal ROS: negative  Neurological ROS: no TIA or stroke symptoms  Dermatological ROS: negative      EXAM:     Vitals:    10/20/20 1021   BP: (!) 174/81   BP Location: Left arm   Patient Position: Sitting   BP Method: Large (Automatic)   Pulse: (!) 54   Weight: 105.1 kg (231 lb 11.3 oz)   Height: 5' 7" (1.702 m)        General:  Alert, oriented, no acute distress      Bilateral Lower extremity exam:    Vascular:   Dorsalis Pedis:  Palpable   Posterior Tibial:  Palpable; DP and PT pulses are dopplerable, waveforms better on the left side compared to the right  Capillary refill time:  3 seconds  Temperature of toes warm to touch  Edema:  Present,       Neurological:     Sharp touch:  normal  Light touch: normal  Tinels Sign:  Absent  Mulders Click:   Absent  +paresthesias (Abnormal spontaneous sensations in feet)      Dermatological:   Skin: thin and atrophic,  Slightly hyperpigmented.   Wounds/Ulcers:  Absent  Bruising:  NONE  Erythema:  Absent  Toenails left 2nd toe and right hallux are thickened and incurvated without paronychia.  Other toenails are greenish brown, elongated.      Musculoskeletal:   No tenderness to palpation at the proximal phalanx area of the left 3rd toe.  No pain with 3rd metatarso-phalangeal joint range of motion   Metatarsophalangeal range of motion:   full range of motion  Subtalar joint range of motion: full range of motion  Ankle joint range of motion:  full range of motion              1/6/20 Imaging:  FINDINGS:  Old healed fracture deformity " involving the distal 4th left metatarsal.  Scattered multi articular degenerative changes including at the 1st MTP joint.  No acute fracture or dislocation or soft tissue swelling.      02/06/2020 Xray :  Impression:   Minimally displaced, healing 3rd proximal phalanx fracture which was not well seen on the previous exam.      ASSESSMENT/PLAN:          Problem List Items Addressed This Visit     Uncontrolled type 2 diabetes mellitus with hyperglycemia    Relevant Orders    DIABETIC SHOES FOR HOME USE      Other Visit Diagnoses     Dermatophytosis of nail    -  Primary    Encounter for diabetic foot exam                · I counseled the patient on the patient's conditions, their implications and medical management.     · Shoe inspection. Diabetic Foot Education. Patient reminded of the importance of good nutrition and blood sugar control to help prevent podiatric complications of diabetes. Patient instructed on proper foot hygiene. We discussed wearing proper shoe gear, daily foot inspections, never walking without protective shoe gear, never putting sharp instruments to feet.  · Consider Kerasal Nail.  Available OTC.  Use daily for at least 6-8 months on the toenails.   · May also continue Penlac.    Routine Foot Care    Date/Time: 10/20/2020 10:15 AM  Performed by: Dione Traore DPM  Authorized by: Dione Traore DPM     Consent Done?:  Yes (Verbal)    Nail Care Type:  Debride  Location(s): All  (Left 1st Toe, Left 3rd Toe, Left 2nd Toe, Left 4th Toe, Left 5th Toe, Right 1st Toe, Right 2nd Toe, Right 3rd Toe, Right 4th Toe and Right 5th Toe)  Patient tolerance:  Patient tolerated the procedure well with no immediate complications     With patient's permission, the toenails mentioned above were aggressively reduced and debrided using a nail nipper, removing all offending nail and debris. The patient will continue to monitor the areas daily, inspect the feet, wear protective shoe gear when ambulatory, and moisturizer  to maintain skin integrity.       Follow up in about 3 months (around 1/20/2021).          Dione Traore DPM  NPI: [id number]         [facility]  [address] [address]  Dept: [phone]  Dept Fax: [phone]

## 2020-11-25 ENCOUNTER — LAB VISIT (OUTPATIENT)
Dept: LAB | Facility: HOSPITAL | Age: 70
End: 2020-11-25
Attending: INTERNAL MEDICINE
Payer: MEDICARE

## 2020-11-25 DIAGNOSIS — E11.65 UNCONTROLLED TYPE 2 DIABETES MELLITUS WITH HYPERGLYCEMIA: ICD-10-CM

## 2020-11-25 LAB
ALBUMIN SERPL BCP-MCNC: 3.6 G/DL (ref 3.5–5.2)
ALP SERPL-CCNC: 108 U/L (ref 55–135)
ALT SERPL W/O P-5'-P-CCNC: 16 U/L (ref 10–44)
ANION GAP SERPL CALC-SCNC: 8 MMOL/L (ref 8–16)
AST SERPL-CCNC: 18 U/L (ref 10–40)
BILIRUB SERPL-MCNC: 0.4 MG/DL (ref 0.1–1)
BUN SERPL-MCNC: 19 MG/DL (ref 8–23)
CALCIUM SERPL-MCNC: 10.5 MG/DL (ref 8.7–10.5)
CHLORIDE SERPL-SCNC: 106 MMOL/L (ref 95–110)
CHOLEST SERPL-MCNC: 189 MG/DL (ref 120–199)
CHOLEST/HDLC SERPL: 5.7 {RATIO} (ref 2–5)
CO2 SERPL-SCNC: 28 MMOL/L (ref 23–29)
CREAT SERPL-MCNC: 1.1 MG/DL (ref 0.5–1.4)
EST. GFR  (AFRICAN AMERICAN): 58.4 ML/MIN/1.73 M^2
EST. GFR  (NON AFRICAN AMERICAN): 50.6 ML/MIN/1.73 M^2
ESTIMATED AVG GLUCOSE: 146 MG/DL (ref 68–131)
ESTIMATED AVG GLUCOSE: 146 MG/DL (ref 68–131)
GLUCOSE SERPL-MCNC: 134 MG/DL (ref 70–110)
HBA1C MFR BLD HPLC: 6.7 % (ref 4–5.6)
HBA1C MFR BLD HPLC: 6.7 % (ref 4–5.6)
HDLC SERPL-MCNC: 33 MG/DL (ref 40–75)
HDLC SERPL: 17.5 % (ref 20–50)
LDLC SERPL CALC-MCNC: 119.4 MG/DL (ref 63–159)
NONHDLC SERPL-MCNC: 156 MG/DL
POTASSIUM SERPL-SCNC: 3.7 MMOL/L (ref 3.5–5.1)
PROT SERPL-MCNC: 7.4 G/DL (ref 6–8.4)
SODIUM SERPL-SCNC: 142 MMOL/L (ref 136–145)
TRIGL SERPL-MCNC: 183 MG/DL (ref 30–150)
TSH SERPL DL<=0.005 MIU/L-ACNC: 2.18 UIU/ML (ref 0.4–4)

## 2020-11-25 PROCEDURE — 83036 HEMOGLOBIN GLYCOSYLATED A1C: CPT

## 2020-11-25 PROCEDURE — 80061 LIPID PANEL: CPT

## 2020-11-25 PROCEDURE — 84443 ASSAY THYROID STIM HORMONE: CPT

## 2020-11-25 PROCEDURE — 80053 COMPREHEN METABOLIC PANEL: CPT

## 2020-11-25 PROCEDURE — 36415 COLL VENOUS BLD VENIPUNCTURE: CPT

## 2020-12-02 ENCOUNTER — PATIENT MESSAGE (OUTPATIENT)
Dept: ENDOCRINOLOGY | Facility: CLINIC | Age: 70
End: 2020-12-02

## 2020-12-15 ENCOUNTER — TELEPHONE (OUTPATIENT)
Dept: ADMINISTRATIVE | Facility: HOSPITAL | Age: 70
End: 2020-12-15

## 2021-01-04 ENCOUNTER — OFFICE VISIT (OUTPATIENT)
Dept: INTERNAL MEDICINE | Facility: CLINIC | Age: 71
End: 2021-01-04
Payer: MEDICARE

## 2021-01-04 ENCOUNTER — TELEPHONE (OUTPATIENT)
Dept: INTERNAL MEDICINE | Facility: CLINIC | Age: 71
End: 2021-01-04

## 2021-01-04 DIAGNOSIS — I10 HYPERTENSION, UNSPECIFIED TYPE: Primary | ICD-10-CM

## 2021-01-04 PROCEDURE — 1159F PR MEDICATION LIST DOCUMENTED IN MEDICAL RECORD: ICD-10-PCS | Mod: 95,,, | Performed by: INTERNAL MEDICINE

## 2021-01-04 PROCEDURE — 3072F PR LOW RISK FOR RETINOPATHY: ICD-10-PCS | Mod: 95,,, | Performed by: INTERNAL MEDICINE

## 2021-01-04 PROCEDURE — 99214 PR OFFICE/OUTPT VISIT, EST, LEVL IV, 30-39 MIN: ICD-10-PCS | Mod: 95,,, | Performed by: INTERNAL MEDICINE

## 2021-01-04 PROCEDURE — 3072F LOW RISK FOR RETINOPATHY: CPT | Mod: 95,,, | Performed by: INTERNAL MEDICINE

## 2021-01-04 PROCEDURE — 1159F MED LIST DOCD IN RCRD: CPT | Mod: 95,,, | Performed by: INTERNAL MEDICINE

## 2021-01-04 PROCEDURE — 99214 OFFICE O/P EST MOD 30 MIN: CPT | Mod: 95,,, | Performed by: INTERNAL MEDICINE

## 2021-01-04 RX ORDER — AMLODIPINE BESYLATE 10 MG/1
10 TABLET ORAL DAILY
Qty: 90 TABLET | Refills: 1 | Status: SHIPPED | OUTPATIENT
Start: 2021-01-04 | End: 2022-01-04

## 2021-01-04 RX ORDER — ESCITALOPRAM OXALATE 10 MG/1
10 TABLET ORAL DAILY
Qty: 90 TABLET | Refills: 1 | Status: SHIPPED | OUTPATIENT
Start: 2021-01-04 | End: 2021-07-02

## 2021-01-13 ENCOUNTER — OFFICE VISIT (OUTPATIENT)
Dept: ENDOCRINOLOGY | Facility: CLINIC | Age: 71
End: 2021-01-13
Payer: MEDICARE

## 2021-01-13 DIAGNOSIS — E55.9 VITAMIN D DEFICIENCY: ICD-10-CM

## 2021-01-13 DIAGNOSIS — E21.3 HYPERPARATHYROIDISM: ICD-10-CM

## 2021-01-13 DIAGNOSIS — E21.0 PRIMARY HYPERPARATHYROIDISM: Primary | ICD-10-CM

## 2021-01-13 DIAGNOSIS — E11.65 UNCONTROLLED TYPE 2 DIABETES MELLITUS WITH HYPERGLYCEMIA: ICD-10-CM

## 2021-01-13 PROCEDURE — 3072F LOW RISK FOR RETINOPATHY: CPT | Mod: 95,,, | Performed by: INTERNAL MEDICINE

## 2021-01-13 PROCEDURE — 3044F PR MOST RECENT HEMOGLOBIN A1C LEVEL <7.0%: ICD-10-PCS | Mod: CPTII,95,, | Performed by: INTERNAL MEDICINE

## 2021-01-13 PROCEDURE — 3072F PR LOW RISK FOR RETINOPATHY: ICD-10-PCS | Mod: 95,,, | Performed by: INTERNAL MEDICINE

## 2021-01-13 PROCEDURE — 1159F PR MEDICATION LIST DOCUMENTED IN MEDICAL RECORD: ICD-10-PCS | Mod: 95,,, | Performed by: INTERNAL MEDICINE

## 2021-01-13 PROCEDURE — 99214 PR OFFICE/OUTPT VISIT, EST, LEVL IV, 30-39 MIN: ICD-10-PCS | Mod: 95,,, | Performed by: INTERNAL MEDICINE

## 2021-01-13 PROCEDURE — 3044F HG A1C LEVEL LT 7.0%: CPT | Mod: CPTII,95,, | Performed by: INTERNAL MEDICINE

## 2021-01-13 PROCEDURE — 1159F MED LIST DOCD IN RCRD: CPT | Mod: 95,,, | Performed by: INTERNAL MEDICINE

## 2021-01-13 PROCEDURE — 99214 OFFICE O/P EST MOD 30 MIN: CPT | Mod: 95,,, | Performed by: INTERNAL MEDICINE

## 2021-01-19 ENCOUNTER — OFFICE VISIT (OUTPATIENT)
Dept: PODIATRY | Facility: CLINIC | Age: 71
End: 2021-01-19
Payer: MEDICARE

## 2021-01-19 VITALS
SYSTOLIC BLOOD PRESSURE: 182 MMHG | DIASTOLIC BLOOD PRESSURE: 83 MMHG | HEART RATE: 55 BPM | WEIGHT: 231.69 LBS | BODY MASS INDEX: 36.36 KG/M2 | HEIGHT: 67 IN

## 2021-01-19 DIAGNOSIS — L60.0 ONYCHOMYCOSIS WITH INGROWN TOENAIL: Primary | ICD-10-CM

## 2021-01-19 DIAGNOSIS — E11.49 TYPE II DIABETES MELLITUS WITH NEUROLOGICAL MANIFESTATIONS: ICD-10-CM

## 2021-01-19 DIAGNOSIS — B35.1 ONYCHOMYCOSIS WITH INGROWN TOENAIL: Primary | ICD-10-CM

## 2021-01-19 DIAGNOSIS — L84 CORN OR CALLUS: ICD-10-CM

## 2021-01-19 PROCEDURE — 3288F PR FALLS RISK ASSESSMENT DOCUMENTED: ICD-10-PCS | Mod: CPTII,S$GLB,, | Performed by: PODIATRIST

## 2021-01-19 PROCEDURE — 99999 PR PBB SHADOW E&M-EST. PATIENT-LVL IV: CPT | Mod: PBBFAC,,, | Performed by: PODIATRIST

## 2021-01-19 PROCEDURE — 3072F LOW RISK FOR RETINOPATHY: CPT | Mod: S$GLB,,, | Performed by: PODIATRIST

## 2021-01-19 PROCEDURE — 11056 ROUTINE FOOT CARE: ICD-10-PCS | Mod: Q9,S$GLB,, | Performed by: PODIATRIST

## 2021-01-19 PROCEDURE — 3008F PR BODY MASS INDEX (BMI) DOCUMENTED: ICD-10-PCS | Mod: CPTII,S$GLB,, | Performed by: PODIATRIST

## 2021-01-19 PROCEDURE — 1125F AMNT PAIN NOTED PAIN PRSNT: CPT | Mod: S$GLB,,, | Performed by: PODIATRIST

## 2021-01-19 PROCEDURE — 99999 PR PBB SHADOW E&M-EST. PATIENT-LVL IV: ICD-10-PCS | Mod: PBBFAC,,, | Performed by: PODIATRIST

## 2021-01-19 PROCEDURE — 1100F PTFALLS ASSESS-DOCD GE2>/YR: CPT | Mod: CPTII,S$GLB,, | Performed by: PODIATRIST

## 2021-01-19 PROCEDURE — 11056 PARNG/CUTG B9 HYPRKR LES 2-4: CPT | Mod: Q9,S$GLB,, | Performed by: PODIATRIST

## 2021-01-19 PROCEDURE — 99499 NO LOS: ICD-10-PCS | Mod: S$GLB,,, | Performed by: PODIATRIST

## 2021-01-19 PROCEDURE — 1100F PR PT FALLS ASSESS DOC 2+ FALLS/FALL W/INJURY/YR: ICD-10-PCS | Mod: CPTII,S$GLB,, | Performed by: PODIATRIST

## 2021-01-19 PROCEDURE — 11721 DEBRIDE NAIL 6 OR MORE: CPT | Mod: 59,Q9,S$GLB, | Performed by: PODIATRIST

## 2021-01-19 PROCEDURE — 11721 ROUTINE FOOT CARE: ICD-10-PCS | Mod: 59,Q9,S$GLB, | Performed by: PODIATRIST

## 2021-01-19 PROCEDURE — 3288F FALL RISK ASSESSMENT DOCD: CPT | Mod: CPTII,S$GLB,, | Performed by: PODIATRIST

## 2021-01-19 PROCEDURE — 99499 UNLISTED E&M SERVICE: CPT | Mod: S$GLB,,, | Performed by: PODIATRIST

## 2021-01-19 PROCEDURE — 3072F PR LOW RISK FOR RETINOPATHY: ICD-10-PCS | Mod: S$GLB,,, | Performed by: PODIATRIST

## 2021-01-19 PROCEDURE — 3008F BODY MASS INDEX DOCD: CPT | Mod: CPTII,S$GLB,, | Performed by: PODIATRIST

## 2021-01-19 PROCEDURE — 1125F PR PAIN SEVERITY QUANTIFIED, PAIN PRESENT: ICD-10-PCS | Mod: S$GLB,,, | Performed by: PODIATRIST

## 2021-02-04 ENCOUNTER — LAB VISIT (OUTPATIENT)
Dept: LAB | Facility: HOSPITAL | Age: 71
End: 2021-02-04
Attending: INTERNAL MEDICINE
Payer: MEDICARE

## 2021-02-04 DIAGNOSIS — E21.0 PRIMARY HYPERPARATHYROIDISM: ICD-10-CM

## 2021-02-04 DIAGNOSIS — E55.9 VITAMIN D DEFICIENCY: ICD-10-CM

## 2021-02-04 LAB
25(OH)D3+25(OH)D2 SERPL-MCNC: 19 NG/ML (ref 30–96)
ALBUMIN SERPL BCP-MCNC: 3.8 G/DL (ref 3.5–5.2)
ANION GAP SERPL CALC-SCNC: 7 MMOL/L (ref 8–16)
BUN SERPL-MCNC: 20 MG/DL (ref 8–23)
CALCIUM SERPL-MCNC: 10.8 MG/DL (ref 8.7–10.5)
CHLORIDE SERPL-SCNC: 104 MMOL/L (ref 95–110)
CO2 SERPL-SCNC: 30 MMOL/L (ref 23–29)
CREAT SERPL-MCNC: 1.2 MG/DL (ref 0.5–1.4)
EST. GFR  (AFRICAN AMERICAN): 52.2 ML/MIN/1.73 M^2
EST. GFR  (NON AFRICAN AMERICAN): 45.3 ML/MIN/1.73 M^2
GLUCOSE SERPL-MCNC: 113 MG/DL (ref 70–110)
PHOSPHATE SERPL-MCNC: 2.3 MG/DL (ref 2.7–4.5)
POTASSIUM SERPL-SCNC: 3.9 MMOL/L (ref 3.5–5.1)
PTH-INTACT SERPL-MCNC: 981 PG/ML (ref 9–77)
SODIUM SERPL-SCNC: 141 MMOL/L (ref 136–145)

## 2021-02-04 PROCEDURE — 80069 RENAL FUNCTION PANEL: CPT

## 2021-02-04 PROCEDURE — 83970 ASSAY OF PARATHORMONE: CPT

## 2021-02-04 PROCEDURE — 36415 COLL VENOUS BLD VENIPUNCTURE: CPT

## 2021-02-04 PROCEDURE — 82306 VITAMIN D 25 HYDROXY: CPT

## 2021-02-05 ENCOUNTER — PATIENT MESSAGE (OUTPATIENT)
Dept: ENDOCRINOLOGY | Facility: CLINIC | Age: 71
End: 2021-02-05

## 2021-02-10 ENCOUNTER — IMMUNIZATION (OUTPATIENT)
Dept: PRIMARY CARE CLINIC | Facility: CLINIC | Age: 71
End: 2021-02-10
Payer: MEDICARE

## 2021-02-10 DIAGNOSIS — Z23 NEED FOR VACCINATION: Primary | ICD-10-CM

## 2021-02-10 PROCEDURE — 0001A PR IMMUNIZ ADMIN, SARS-COV-2 COVID-19 VACC, 30MCG/0.3ML, 1ST DOSE: CPT | Mod: CV19,S$GLB,, | Performed by: INTERNAL MEDICINE

## 2021-02-10 PROCEDURE — 91300 PR SARS-COV- 2 COVID-19 VACCINE, NO PRSV, 30MCG/0.3ML, IM: CPT | Mod: S$GLB,,, | Performed by: INTERNAL MEDICINE

## 2021-02-10 PROCEDURE — 91300 PR SARS-COV- 2 COVID-19 VACCINE, NO PRSV, 30MCG/0.3ML, IM: ICD-10-PCS | Mod: S$GLB,,, | Performed by: INTERNAL MEDICINE

## 2021-02-10 PROCEDURE — 0001A PR IMMUNIZ ADMIN, SARS-COV-2 COVID-19 VACC, 30MCG/0.3ML, 1ST DOSE: ICD-10-PCS | Mod: CV19,S$GLB,, | Performed by: INTERNAL MEDICINE

## 2021-02-10 RX ADMIN — Medication 0.3 ML: at 01:02

## 2021-02-17 ENCOUNTER — HOSPITAL ENCOUNTER (OUTPATIENT)
Dept: RADIOLOGY | Facility: HOSPITAL | Age: 71
Discharge: HOME | End: 2021-02-17
Attending: INTERNAL MEDICINE
Payer: MEDICARE

## 2021-02-17 DIAGNOSIS — E21.0 PRIMARY HYPERPARATHYROIDISM: ICD-10-CM

## 2021-02-17 PROCEDURE — 70492 CT SFT TSUE NCK W/O & W/DYE: CPT | Mod: TC

## 2021-02-17 PROCEDURE — 70492 CT NECK PARATHYROID (4D): ICD-10-PCS | Mod: 26,,, | Performed by: RADIOLOGY

## 2021-02-17 PROCEDURE — 70492 CT SFT TSUE NCK W/O & W/DYE: CPT | Mod: 26,,, | Performed by: RADIOLOGY

## 2021-02-17 PROCEDURE — 25500020 PHARM REV CODE 255: Performed by: INTERNAL MEDICINE

## 2021-02-17 RX ADMIN — IOHEXOL 75 ML: 350 INJECTION, SOLUTION INTRAVENOUS at 10:02

## 2021-02-23 ENCOUNTER — PATIENT MESSAGE (OUTPATIENT)
Dept: ENDOCRINOLOGY | Facility: CLINIC | Age: 71
End: 2021-02-23

## 2021-02-23 DIAGNOSIS — E21.0 PRIMARY HYPERPARATHYROIDISM: Primary | ICD-10-CM

## 2021-03-03 ENCOUNTER — IMMUNIZATION (OUTPATIENT)
Dept: PRIMARY CARE CLINIC | Facility: CLINIC | Age: 71
End: 2021-03-03
Payer: MEDICARE

## 2021-03-03 DIAGNOSIS — Z23 NEED FOR VACCINATION: Primary | ICD-10-CM

## 2021-03-03 PROCEDURE — 0002A PR IMMUNIZ ADMIN, SARS-COV-2 COVID-19 VACC, 30MCG/0.3ML, 2ND DOSE: CPT | Mod: CV19,S$GLB,, | Performed by: INTERNAL MEDICINE

## 2021-03-03 PROCEDURE — 91300 PR SARS-COV- 2 COVID-19 VACCINE, NO PRSV, 30MCG/0.3ML, IM: CPT | Mod: S$GLB,,, | Performed by: INTERNAL MEDICINE

## 2021-03-03 PROCEDURE — 0002A PR IMMUNIZ ADMIN, SARS-COV-2 COVID-19 VACC, 30MCG/0.3ML, 2ND DOSE: ICD-10-PCS | Mod: CV19,S$GLB,, | Performed by: INTERNAL MEDICINE

## 2021-03-03 PROCEDURE — 91300 PR SARS-COV- 2 COVID-19 VACCINE, NO PRSV, 30MCG/0.3ML, IM: ICD-10-PCS | Mod: S$GLB,,, | Performed by: INTERNAL MEDICINE

## 2021-03-03 RX ADMIN — Medication 0.3 ML: at 01:03

## 2021-03-08 ENCOUNTER — OFFICE VISIT (OUTPATIENT)
Dept: SURGERY | Facility: CLINIC | Age: 71
End: 2021-03-08
Attending: INTERNAL MEDICINE
Payer: MEDICARE

## 2021-03-08 ENCOUNTER — PATIENT OUTREACH (OUTPATIENT)
Dept: ADMINISTRATIVE | Facility: OTHER | Age: 71
End: 2021-03-08

## 2021-03-08 DIAGNOSIS — E21.0 PRIMARY HYPERPARATHYROIDISM: Primary | ICD-10-CM

## 2021-03-08 PROCEDURE — 1159F PR MEDICATION LIST DOCUMENTED IN MEDICAL RECORD: ICD-10-PCS | Mod: S$GLB,,, | Performed by: SURGERY

## 2021-03-08 PROCEDURE — 3072F LOW RISK FOR RETINOPATHY: CPT | Mod: S$GLB,,, | Performed by: SURGERY

## 2021-03-08 PROCEDURE — 99204 PR OFFICE/OUTPT VISIT, NEW, LEVL IV, 45-59 MIN: ICD-10-PCS | Mod: S$GLB,,, | Performed by: SURGERY

## 2021-03-08 PROCEDURE — 3288F FALL RISK ASSESSMENT DOCD: CPT | Mod: CPTII,S$GLB,, | Performed by: SURGERY

## 2021-03-08 PROCEDURE — 3072F PR LOW RISK FOR RETINOPATHY: ICD-10-PCS | Mod: S$GLB,,, | Performed by: SURGERY

## 2021-03-08 PROCEDURE — 1101F PR PT FALLS ASSESS DOC 0-1 FALLS W/OUT INJ PAST YR: ICD-10-PCS | Mod: CPTII,S$GLB,, | Performed by: SURGERY

## 2021-03-08 PROCEDURE — 3288F PR FALLS RISK ASSESSMENT DOCUMENTED: ICD-10-PCS | Mod: CPTII,S$GLB,, | Performed by: SURGERY

## 2021-03-08 PROCEDURE — 99204 OFFICE O/P NEW MOD 45 MIN: CPT | Mod: S$GLB,,, | Performed by: SURGERY

## 2021-03-08 PROCEDURE — 1159F MED LIST DOCD IN RCRD: CPT | Mod: S$GLB,,, | Performed by: SURGERY

## 2021-03-08 PROCEDURE — 1101F PT FALLS ASSESS-DOCD LE1/YR: CPT | Mod: CPTII,S$GLB,, | Performed by: SURGERY

## 2021-04-05 ENCOUNTER — PATIENT MESSAGE (OUTPATIENT)
Dept: ADMINISTRATIVE | Facility: HOSPITAL | Age: 71
End: 2021-04-05

## 2021-05-11 ENCOUNTER — TELEPHONE (OUTPATIENT)
Dept: INTERNAL MEDICINE | Facility: CLINIC | Age: 71
End: 2021-05-11

## 2021-05-11 ENCOUNTER — PATIENT MESSAGE (OUTPATIENT)
Dept: INTERNAL MEDICINE | Facility: CLINIC | Age: 71
End: 2021-05-11

## 2021-06-04 ENCOUNTER — TELEPHONE (OUTPATIENT)
Dept: ADMINISTRATIVE | Facility: HOSPITAL | Age: 71
End: 2021-06-04

## 2021-07-07 ENCOUNTER — PATIENT MESSAGE (OUTPATIENT)
Dept: ADMINISTRATIVE | Facility: HOSPITAL | Age: 71
End: 2021-07-07

## 2021-08-03 ENCOUNTER — PATIENT MESSAGE (OUTPATIENT)
Dept: ADMINISTRATIVE | Facility: HOSPITAL | Age: 71
End: 2021-08-03

## 2021-10-04 ENCOUNTER — PATIENT MESSAGE (OUTPATIENT)
Dept: ADMINISTRATIVE | Facility: HOSPITAL | Age: 71
End: 2021-10-04

## 2022-01-26 ENCOUNTER — PATIENT MESSAGE (OUTPATIENT)
Dept: ADMINISTRATIVE | Facility: HOSPITAL | Age: 72
End: 2022-01-26
Payer: MEDICARE

## 2022-02-18 ENCOUNTER — PES CALL (OUTPATIENT)
Dept: ADMINISTRATIVE | Facility: CLINIC | Age: 72
End: 2022-02-18
Payer: MEDICARE

## 2022-03-16 ENCOUNTER — PATIENT MESSAGE (OUTPATIENT)
Dept: ADMINISTRATIVE | Facility: HOSPITAL | Age: 72
End: 2022-03-16
Payer: MEDICARE

## 2022-05-05 ENCOUNTER — PES CALL (OUTPATIENT)
Dept: ADMINISTRATIVE | Facility: CLINIC | Age: 72
End: 2022-05-05
Payer: MEDICARE

## 2022-07-05 ENCOUNTER — PES CALL (OUTPATIENT)
Dept: ADMINISTRATIVE | Facility: CLINIC | Age: 72
End: 2022-07-05
Payer: MEDICARE

## 2022-08-31 DIAGNOSIS — E11.65 UNCONTROLLED TYPE 2 DIABETES MELLITUS WITH HYPERGLYCEMIA: ICD-10-CM

## 2022-09-01 ENCOUNTER — PES CALL (OUTPATIENT)
Dept: ADMINISTRATIVE | Facility: CLINIC | Age: 72
End: 2022-09-01
Payer: MEDICARE

## 2022-09-06 ENCOUNTER — PES CALL (OUTPATIENT)
Dept: ADMINISTRATIVE | Facility: CLINIC | Age: 72
End: 2022-09-06
Payer: MEDICARE

## 2022-09-08 ENCOUNTER — PES CALL (OUTPATIENT)
Dept: ADMINISTRATIVE | Facility: CLINIC | Age: 72
End: 2022-09-08
Payer: MEDICARE

## 2022-10-10 ENCOUNTER — PES CALL (OUTPATIENT)
Dept: ADMINISTRATIVE | Facility: CLINIC | Age: 72
End: 2022-10-10
Payer: MEDICARE